# Patient Record
Sex: FEMALE | Race: WHITE | Employment: UNEMPLOYED | URBAN - METROPOLITAN AREA
[De-identification: names, ages, dates, MRNs, and addresses within clinical notes are randomized per-mention and may not be internally consistent; named-entity substitution may affect disease eponyms.]

---

## 2017-04-27 PROBLEM — M50.30 DEGENERATIVE DISC DISEASE, CERVICAL: Status: ACTIVE | Noted: 2017-04-27

## 2017-04-27 PROBLEM — G56.01 CARPAL TUNNEL SYNDROME OF RIGHT WRIST: Status: ACTIVE | Noted: 2017-04-27

## 2017-06-12 ENCOUNTER — HOSPITAL ENCOUNTER (OUTPATIENT)
Dept: WOMENS IMAGING | Age: 62
Discharge: HOME | End: 2017-06-12
Payer: COMMERCIAL

## 2017-06-12 DIAGNOSIS — Z12.39 SCREENING BREAST EXAMINATION: ICD-10-CM

## 2017-06-12 PROCEDURE — G0202 SCR MAMMO BI INCL CAD: HCPCS

## 2018-06-28 ENCOUNTER — HOSPITAL ENCOUNTER (OUTPATIENT)
Dept: WOMENS IMAGING | Age: 63
Discharge: HOME | End: 2018-06-30

## 2018-06-28 DIAGNOSIS — Z12.31 ENCOUNTER FOR SCREENING MAMMOGRAM FOR BREAST CANCER: ICD-10-CM

## 2018-06-28 PROCEDURE — 77067 SCR MAMMO BI INCL CAD: CPT

## 2020-03-24 ENCOUNTER — VIRTUAL VISIT (OUTPATIENT)
Dept: INTERNAL MEDICINE | Age: 65
End: 2020-03-24
Payer: COMMERCIAL

## 2020-03-24 VITALS
HEIGHT: 62 IN | BODY MASS INDEX: 43.43 KG/M2 | TEMPERATURE: 98 F | HEART RATE: 82 BPM | DIASTOLIC BLOOD PRESSURE: 82 MMHG | WEIGHT: 236 LBS | OXYGEN SATURATION: 96 % | SYSTOLIC BLOOD PRESSURE: 134 MMHG

## 2020-03-24 PROBLEM — I10 ESSENTIAL HYPERTENSION: Status: ACTIVE | Noted: 2020-03-24

## 2020-03-24 PROBLEM — Z90.710 H/O TOTAL HYSTERECTOMY: Status: ACTIVE | Noted: 2020-03-24

## 2020-03-24 PROBLEM — Z85.3 HISTORY OF LEFT BREAST CANCER: Status: ACTIVE | Noted: 2020-03-24

## 2020-03-24 PROBLEM — Z83.3 FH: DIABETES MELLITUS: Status: ACTIVE | Noted: 2020-03-24

## 2020-03-24 PROBLEM — E78.2 MIXED HYPERLIPIDEMIA: Status: ACTIVE | Noted: 2020-03-24

## 2020-03-24 PROCEDURE — 99203 OFFICE O/P NEW LOW 30 MIN: CPT | Performed by: PHYSICIAN ASSISTANT

## 2020-03-24 RX ORDER — LOVASTATIN 10 MG/1
TABLET ORAL
COMMUNITY
Start: 2020-03-03 | End: 2020-03-24 | Stop reason: SDUPTHER

## 2020-03-24 RX ORDER — LOSARTAN POTASSIUM AND HYDROCHLOROTHIAZIDE 25; 100 MG/1; MG/1
TABLET ORAL
Qty: 90 TABLET | Refills: 3 | Status: SHIPPED | OUTPATIENT
Start: 2020-03-24 | End: 2021-04-03

## 2020-03-24 RX ORDER — LOSARTAN POTASSIUM AND HYDROCHLOROTHIAZIDE 25; 100 MG/1; MG/1
TABLET ORAL
COMMUNITY
Start: 2020-03-06 | End: 2020-03-24 | Stop reason: SDUPTHER

## 2020-03-24 RX ORDER — LOVASTATIN 10 MG/1
TABLET ORAL
Qty: 90 TABLET | Refills: 3 | Status: SHIPPED | OUTPATIENT
Start: 2020-03-24 | End: 2021-04-03

## 2020-03-24 ASSESSMENT — ENCOUNTER SYMPTOMS
ABDOMINAL PAIN: 0
NAUSEA: 0
STRIDOR: 0
RECTAL PAIN: 0
VOMITING: 0
WHEEZING: 0
SHORTNESS OF BREATH: 0
CONSTIPATION: 0
DIARRHEA: 0
BLOOD IN STOOL: 0
CHEST TIGHTNESS: 0
COUGH: 0

## 2020-03-24 ASSESSMENT — PATIENT HEALTH QUESTIONNAIRE - PHQ9
SUM OF ALL RESPONSES TO PHQ QUESTIONS 1-9: 0
SUM OF ALL RESPONSES TO PHQ QUESTIONS 1-9: 0
1. LITTLE INTEREST OR PLEASURE IN DOING THINGS: 0
SUM OF ALL RESPONSES TO PHQ9 QUESTIONS 1 & 2: 0
2. FEELING DOWN, DEPRESSED OR HOPELESS: 0

## 2020-03-24 NOTE — PROGRESS NOTES
SUBJECTIVE  Odessa Silva, 60 y.o. female presents today with:  Chief Complaint   Patient presents with   [address]     Pt states prev pcp no longer takes her Insurance. Dr. Camron Reyes. Needs refills for BP, and chol meds     PCP:  ÁLVARO Gardiner      HPI     New patient here to establish care       Hypertension:    Home blood pressure monitoring: Yes - occasionally- runs within range 134-88. She uses a light salt adherent to a low sodium diet. Patient denies chest pain. Use of agents associated with hypertension: NSAIDS. Hyperlipidemia:  No new myalgias or GI upset on lovastatin (Mevacor). Takes aspirin daily     Mammogram done in 2018   Colonoscopy done in Aug 17/18- by dr Lorri Heimlich     Diabetes and Hypertension Visit Information    BP Readings from Last 3 Encounters:   03/24/20 134/82   04/27/17 124/60          No results found for: LABA1C, LABMICR, LDLCHOLESTEROL, LDLCALC, HDL, BUN, CREATININE, GLUCOSE     Patient Care Team:  ÁLVARO Gardiner as PCP - General (Physician Assistant)  ÁLVARO Gardiner as PCP - [facility] Empaneled Provider         Medical History Review  Past Medical, Family, and Social History reviewed and does contribute to the patient presenting condition    Health Maintenance   Topic Date Due    Potassium monitoring  1955    Creatinine monitoring  1955    Hepatitis C screen  1955    Lipid screen  05/14/1967    HIV screen  05/14/1972    DTaP/Tdap/Td vaccine (1 - Tdap) 05/14/1976    Diabetes screen  05/14/1997    Shingles Vaccine (1 of 2) 05/14/2007    Colon cancer screen colonoscopy  05/14/2007    Breast cancer screen  06/28/2019    Flu vaccine (1) 09/01/2019    Hepatitis A vaccine  Aged Out    Hepatitis B vaccine  Aged Out    Hib vaccine  Aged Out    Meningococcal (ACWY) vaccine  Aged Out    Pneumococcal 0-64 years Vaccine  Aged Out             No past medical history on file. No past surgical history on file.   Social History

## 2020-06-15 ENCOUNTER — HOSPITAL ENCOUNTER (OUTPATIENT)
Dept: LAB | Age: 65
Discharge: HOME | End: 2020-06-15
Payer: COMMERCIAL

## 2020-06-15 LAB
ALBUMIN SERPL-MCNC: 4.3 G/DL (ref 3.5–4.6)
ALP BLD-CCNC: 74 U/L (ref 40–130)
ALT SERPL-CCNC: 24 U/L (ref 0–33)
ANION GAP SERPL CALCULATED.3IONS-SCNC: 12 MEQ/L (ref 9–15)
AST SERPL-CCNC: 18 U/L (ref 0–35)
BASOPHILS ABSOLUTE: 0 K/UL (ref 0–0.2)
BASOPHILS RELATIVE PERCENT: 0.9 %
BILIRUB SERPL-MCNC: <0.2 MG/DL (ref 0.2–0.7)
BUN BLDV-MCNC: 18 MG/DL (ref 8–23)
CALCIUM SERPL-MCNC: 10.4 MG/DL (ref 8.5–9.9)
CHLORIDE BLD-SCNC: 106 MEQ/L (ref 95–107)
CHOLESTEROL, TOTAL: 167 MG/DL (ref 0–199)
CO2: 26 MEQ/L (ref 20–31)
CREAT SERPL-MCNC: 1.03 MG/DL (ref 0.5–0.9)
EOSINOPHILS ABSOLUTE: 0.1 K/UL (ref 0–0.7)
EOSINOPHILS RELATIVE PERCENT: 2.4 %
GFR AFRICAN AMERICAN: >60
GFR NON-AFRICAN AMERICAN: 54.1
GLOBULIN: 3.1 G/DL (ref 2.3–3.5)
GLUCOSE BLD-MCNC: 97 MG/DL (ref 70–99)
HBA1C MFR BLD: 6.1 % (ref 4.8–5.9)
HCT VFR BLD CALC: 39.7 % (ref 37–47)
HDLC SERPL-MCNC: 60 MG/DL (ref 40–59)
HEMOGLOBIN: 13.3 G/DL (ref 12–16)
LDL CHOLESTEROL CALCULATED: 74 MG/DL (ref 0–129)
LYMPHOCYTES ABSOLUTE: 1.6 K/UL (ref 1–4.8)
LYMPHOCYTES RELATIVE PERCENT: 34.1 %
MCH RBC QN AUTO: 29.2 PG (ref 27–31.3)
MCHC RBC AUTO-ENTMCNC: 33.5 % (ref 33–37)
MCV RBC AUTO: 87.3 FL (ref 82–100)
MONOCYTES ABSOLUTE: 0.4 K/UL (ref 0.2–0.8)
MONOCYTES RELATIVE PERCENT: 7.4 %
NEUTROPHILS ABSOLUTE: 2.7 K/UL (ref 1.4–6.5)
NEUTROPHILS RELATIVE PERCENT: 55.2 %
PDW BLD-RTO: 14.2 % (ref 11.5–14.5)
PLATELET # BLD: 316 K/UL (ref 130–400)
POTASSIUM SERPL-SCNC: 4.1 MEQ/L (ref 3.4–4.9)
RBC # BLD: 4.54 M/UL (ref 4.2–5.4)
SODIUM BLD-SCNC: 144 MEQ/L (ref 135–144)
TOTAL PROTEIN: 7.4 G/DL (ref 6.3–8)
TRIGL SERPL-MCNC: 166 MG/DL (ref 0–150)
WBC # BLD: 4.8 K/UL (ref 4.8–10.8)

## 2020-06-15 PROCEDURE — 80053 COMPREHEN METABOLIC PANEL: CPT

## 2020-06-15 PROCEDURE — 36415 COLL VENOUS BLD VENIPUNCTURE: CPT

## 2020-06-15 PROCEDURE — 85025 COMPLETE CBC W/AUTO DIFF WBC: CPT

## 2020-06-15 PROCEDURE — 83036 HEMOGLOBIN GLYCOSYLATED A1C: CPT

## 2020-06-15 PROCEDURE — 80061 LIPID PANEL: CPT

## 2020-06-18 PROBLEM — R73.03 PREDIABETES: Status: ACTIVE | Noted: 2020-06-18

## 2020-07-06 ENCOUNTER — HOSPITAL ENCOUNTER (OUTPATIENT)
Dept: WOMENS IMAGING | Age: 65
Discharge: HOME | End: 2020-07-08
Payer: COMMERCIAL

## 2020-07-06 PROCEDURE — 77067 SCR MAMMO BI INCL CAD: CPT

## 2020-07-17 RX ORDER — MECLIZINE HYDROCHLORIDE 25 MG/1
25 TABLET ORAL 3 TIMES DAILY PRN
Qty: 90 TABLET | Refills: 0 | Status: SHIPPED | OUTPATIENT
Start: 2020-07-17 | End: 2020-07-27

## 2020-07-17 NOTE — TELEPHONE ENCOUNTER
Requesting medication refill. Please approve or deny this request.    Rx requested:  Requested Prescriptions     Pending Prescriptions Disp Refills    meclizine (ANTIVERT) 25 MG tablet        Sig: Take by mouth       Last Office Visit:   3/24/20    Last Filled:  Nn/a    Last Labs:  6/15/20    Next Visit Date:  No future appointments.

## 2020-08-12 RX ORDER — MECLIZINE HYDROCHLORIDE 25 MG/1
25 TABLET ORAL 3 TIMES DAILY PRN
Qty: 15 TABLET | Refills: 0 | Status: SHIPPED | OUTPATIENT
Start: 2020-08-12 | End: 2021-04-09 | Stop reason: SDUPTHER

## 2020-08-12 NOTE — TELEPHONE ENCOUNTER
RAW requesting medication refill. Please approve or deny this request.    Rx requested:  Requested Prescriptions     Pending Prescriptions Disp Refills    meclizine (ANTIVERT) 25 MG tablet 15 tablet 0     Sig: Take 1 tablet by mouth 3 times daily as needed for Dizziness         Last Office Visit:   3/24/2020    LAST REFILL 7/17/20  Next Visit Date:  No future appointments.

## 2021-04-09 ENCOUNTER — OFFICE VISIT (OUTPATIENT)
Dept: INTERNAL MEDICINE | Age: 66
End: 2021-04-09
Payer: COMMERCIAL

## 2021-04-09 VITALS
OXYGEN SATURATION: 97 % | DIASTOLIC BLOOD PRESSURE: 86 MMHG | HEIGHT: 62 IN | WEIGHT: 224 LBS | BODY MASS INDEX: 41.22 KG/M2 | TEMPERATURE: 96.8 F | SYSTOLIC BLOOD PRESSURE: 124 MMHG | HEART RATE: 64 BPM

## 2021-04-09 DIAGNOSIS — E78.2 MIXED HYPERLIPIDEMIA: ICD-10-CM

## 2021-04-09 DIAGNOSIS — I10 ESSENTIAL HYPERTENSION: ICD-10-CM

## 2021-04-09 DIAGNOSIS — Z00.00 ANNUAL PHYSICAL EXAM: Primary | ICD-10-CM

## 2021-04-09 PROCEDURE — 99396 PREV VISIT EST AGE 40-64: CPT | Performed by: PHYSICIAN ASSISTANT

## 2021-04-09 RX ORDER — MECLIZINE HYDROCHLORIDE 25 MG/1
25 TABLET ORAL 3 TIMES DAILY PRN
Qty: 15 TABLET | Refills: 0 | Status: SHIPPED | OUTPATIENT
Start: 2021-04-09 | End: 2021-04-14

## 2021-04-09 RX ORDER — LOVASTATIN 10 MG/1
TABLET ORAL
Qty: 90 TABLET | Refills: 2 | Status: SHIPPED | OUTPATIENT
Start: 2021-04-09 | End: 2021-12-17 | Stop reason: SDUPTHER

## 2021-04-09 RX ORDER — LOSARTAN POTASSIUM AND HYDROCHLOROTHIAZIDE 25; 100 MG/1; MG/1
TABLET ORAL
Qty: 90 TABLET | Refills: 2 | Status: SHIPPED | OUTPATIENT
Start: 2021-04-09 | End: 2021-12-17 | Stop reason: SDUPTHER

## 2021-04-09 RX ORDER — MECLIZINE HCL 12.5 MG/1
25 TABLET ORAL 3 TIMES DAILY PRN
COMMUNITY
End: 2021-04-09 | Stop reason: SDUPTHER

## 2021-04-09 SDOH — ECONOMIC STABILITY: FOOD INSECURITY: WITHIN THE PAST 12 MONTHS, YOU WORRIED THAT YOUR FOOD WOULD RUN OUT BEFORE YOU GOT MONEY TO BUY MORE.: NEVER TRUE

## 2021-04-09 SDOH — ECONOMIC STABILITY: INCOME INSECURITY: HOW HARD IS IT FOR YOU TO PAY FOR THE VERY BASICS LIKE FOOD, HOUSING, MEDICAL CARE, AND HEATING?: NOT HARD AT ALL

## 2021-04-09 SDOH — ECONOMIC STABILITY: FOOD INSECURITY: WITHIN THE PAST 12 MONTHS, THE FOOD YOU BOUGHT JUST DIDN'T LAST AND YOU DIDN'T HAVE MONEY TO GET MORE.: NEVER TRUE

## 2021-04-09 SDOH — ECONOMIC STABILITY: TRANSPORTATION INSECURITY
IN THE PAST 12 MONTHS, HAS THE LACK OF TRANSPORTATION KEPT YOU FROM MEDICAL APPOINTMENTS OR FROM GETTING MEDICATIONS?: NO

## 2021-04-09 ASSESSMENT — PATIENT HEALTH QUESTIONNAIRE - PHQ9
SUM OF ALL RESPONSES TO PHQ9 QUESTIONS 1 & 2: 0
SUM OF ALL RESPONSES TO PHQ QUESTIONS 1-9: 0
1. LITTLE INTEREST OR PLEASURE IN DOING THINGS: 0

## 2021-04-09 ASSESSMENT — ENCOUNTER SYMPTOMS
RESPIRATORY NEGATIVE: 1
GASTROINTESTINAL NEGATIVE: 1
EYES NEGATIVE: 1

## 2021-04-09 NOTE — PROGRESS NOTES
Patient advised that lab orders were resent to [facility].   She verbally verified understanding.     Smokeless tobacco: Never Used   Substance and Sexual Activity    Alcohol use: No    Drug use: No    Sexual activity: Not on file   Lifestyle    Physical activity     Days per week: Not on file     Minutes per session: Not on file    Stress: Not on file   Relationships    Social connections     Talks on phone: Not on file     Gets together: Not on file     Attends Orthodox service: Not on file     Active member of club or organization: Not on file     Attends meetings of clubs or organizations: Not on file     Relationship status: Not on file    Intimate partner violence     Fear of current or ex partner: Not on file     Emotionally abused: Not on file     Physically abused: Not on file     Forced sexual activity: Not on file   Other Topics Concern    Not on file   Social History Narrative    Not on file        ADVANCE DIRECTIVE: N, <no information>    Vitals:    04/09/21 1002   BP: 124/86   Site: Right Upper Arm   Position: Sitting   Cuff Size: Large Adult   Pulse: 64   Temp: 96.8 °F (36 °C)   TempSrc: Temporal   SpO2: 97%   Weight: 224 lb (101.6 kg)   Height: 5' 2\" (1.575 m)       Physical Exam  Vitals signs reviewed. Constitutional:       Appearance: Normal appearance. HENT:      Head: Normocephalic and atraumatic. Neck:      Musculoskeletal: Normal range of motion and neck supple. Cardiovascular:      Rate and Rhythm: Normal rate. Pulses: Normal pulses. Heart sounds: Normal heart sounds. Pulmonary:      Effort: Pulmonary effort is normal.   Abdominal:      General: Bowel sounds are normal.      Palpations: Abdomen is soft. Musculoskeletal: Normal range of motion. Skin:     General: Skin is warm. Neurological:      General: No focal deficit present. Mental Status: She is alert and oriented to person, place, and time. Psychiatric:         Mood and Affect: Mood normal.         Behavior: Behavior normal.         Thought Content:  Thought content normal.         Judgment: Judgment normal.           Lab Results   Component Value Date    CHOL 167 06/15/2020    TRIG 166 06/15/2020    HDL 60 06/15/2020    LDLCALC 74 06/15/2020    GLUCOSE 97 06/15/2020       The 10-year ASCVD risk score (Ramandeep Roa, et al., 2013) is: 4.9%    Values used to calculate the score:      Age: 63 years      Sex: Female      Is Non- : No      Diabetic: No      Tobacco smoker: No      Systolic Blood Pressure: 985 mmHg      Is BP treated: Yes      HDL Cholesterol: 60 mg/dL      Total Cholesterol: 167 mg/dL      There is no immunization history on file for this patient. Health Maintenance   Topic Date Due    Hepatitis C screen  Never done    HIV screen  Never done    COVID-19 Vaccine (1) Never done    DTaP/Tdap/Td vaccine (1 - Tdap) Never done    Shingles Vaccine (1 of 2) Never done    Colon cancer screen colonoscopy  Never done    A1C test (Diabetic or Prediabetic)  06/15/2021    Lipid screen  06/15/2021    Potassium monitoring  06/15/2021    Creatinine monitoring  06/15/2021    Breast cancer screen  07/06/2021    Flu vaccine (Season Ended) 09/01/2021    Hepatitis A vaccine  Aged Out    Hepatitis B vaccine  Aged Out    Hib vaccine  Aged Out    Meningococcal (ACWY) vaccine  Aged Out    Pneumococcal 0-64 years Vaccine  Aged Out         ASSESSMENT/PLAN:  1. Annual physical exam  - CBC Auto Differential; Future  - Comprehensive Metabolic Panel; Future  - Lipid Panel; Future  - colonoscopy done with Dr Asael Coronel,  2017    2. Essential hypertension  - stable   - losartan-hydroCHLOROthiazide (HYZAAR) 100-25 MG per tablet; take 1 tablet by mouth once daily  Dispense: 90 tablet; Refill: 2    3. Mixed hyperlipidemia  - controlled   - lovastatin (MEVACOR) 10 MG tablet; take 1 tablet by mouth once daily  Dispense: 90 tablet; Refill: 2          Return in about 1 year (around 4/9/2022) for annual with fasting labs. An electronic signature was used to authenticate this note.     --Liseth Simpson ÁLVARO Samuel on 1/29/2021 at 12:17 PM

## 2021-04-14 RX ORDER — MECLIZINE HYDROCHLORIDE 25 MG/1
TABLET ORAL
Qty: 90 TABLET | Refills: 1 | Status: SHIPPED | OUTPATIENT
Start: 2021-04-14 | End: 2021-06-22

## 2021-06-12 ENCOUNTER — HOSPITAL ENCOUNTER (OUTPATIENT)
Dept: LAB | Age: 66
Discharge: HOME | End: 2021-06-12
Payer: COMMERCIAL

## 2021-06-12 DIAGNOSIS — Z00.00 ANNUAL PHYSICAL EXAM: ICD-10-CM

## 2021-06-12 LAB
ALBUMIN SERPL-MCNC: 4.1 G/DL (ref 3.5–4.6)
ALP BLD-CCNC: 73 U/L (ref 40–130)
ALT SERPL-CCNC: 20 U/L (ref 0–33)
ANION GAP SERPL CALCULATED.3IONS-SCNC: 11 MEQ/L (ref 9–15)
AST SERPL-CCNC: 23 U/L (ref 0–35)
BASOPHILS ABSOLUTE: 0 K/UL (ref 0–0.2)
BASOPHILS RELATIVE PERCENT: 1 %
BILIRUB SERPL-MCNC: 0.4 MG/DL (ref 0.2–0.7)
BUN BLDV-MCNC: 17 MG/DL (ref 8–23)
CALCIUM SERPL-MCNC: 9.9 MG/DL (ref 8.5–9.9)
CHLORIDE BLD-SCNC: 101 MEQ/L (ref 95–107)
CHOLESTEROL, TOTAL: 168 MG/DL (ref 0–199)
CO2: 26 MEQ/L (ref 20–31)
CREAT SERPL-MCNC: 0.94 MG/DL (ref 0.5–0.9)
EOSINOPHILS ABSOLUTE: 0.1 K/UL (ref 0–0.7)
EOSINOPHILS RELATIVE PERCENT: 2.9 %
GFR AFRICAN AMERICAN: >60
GFR NON-AFRICAN AMERICAN: 59.9
GLOBULIN: 3 G/DL (ref 2.3–3.5)
GLUCOSE BLD-MCNC: 94 MG/DL (ref 70–99)
HCT VFR BLD CALC: 38.4 % (ref 37–47)
HDLC SERPL-MCNC: 60 MG/DL (ref 40–59)
HEMOGLOBIN: 13 G/DL (ref 12–16)
LDL CHOLESTEROL CALCULATED: 77 MG/DL (ref 0–129)
LYMPHOCYTES ABSOLUTE: 1.5 K/UL (ref 1–4.8)
LYMPHOCYTES RELATIVE PERCENT: 33.8 %
MCH RBC QN AUTO: 30 PG (ref 27–31.3)
MCHC RBC AUTO-ENTMCNC: 33.9 % (ref 33–37)
MCV RBC AUTO: 88.5 FL (ref 82–100)
MONOCYTES ABSOLUTE: 0.4 K/UL (ref 0.2–0.8)
MONOCYTES RELATIVE PERCENT: 9.8 %
NEUTROPHILS ABSOLUTE: 2.3 K/UL (ref 1.4–6.5)
NEUTROPHILS RELATIVE PERCENT: 52.5 %
PDW BLD-RTO: 14.2 % (ref 11.5–14.5)
PLATELET # BLD: 283 K/UL (ref 130–400)
POTASSIUM SERPL-SCNC: 3.7 MEQ/L (ref 3.4–4.9)
RBC # BLD: 4.34 M/UL (ref 4.2–5.4)
SODIUM BLD-SCNC: 138 MEQ/L (ref 135–144)
TOTAL PROTEIN: 7.1 G/DL (ref 6.3–8)
TRIGL SERPL-MCNC: 154 MG/DL (ref 0–150)
WBC # BLD: 4.4 K/UL (ref 4.8–10.8)

## 2021-06-12 PROCEDURE — 80061 LIPID PANEL: CPT

## 2021-06-12 PROCEDURE — 36415 COLL VENOUS BLD VENIPUNCTURE: CPT

## 2021-06-12 PROCEDURE — 85025 COMPLETE CBC W/AUTO DIFF WBC: CPT

## 2021-06-12 PROCEDURE — 80053 COMPREHEN METABOLIC PANEL: CPT

## 2021-06-17 NOTE — TELEPHONE ENCOUNTER
Requesting medication refill. Please approve or deny this request.    Rx requested:  Requested Prescriptions     Pending Prescriptions Disp Refills    meclizine (ANTIVERT) 25 MG tablet [Pharmacy Med Name: MECLIZINE 25 MG TABLET] 90 tablet 1     Sig: take 1 tablet by mouth three times a day if needed for dizziness       Last Office Visit, reason seen and by who:   4/9/2021 Annual Sully Victor      FOLLOW UP PLAN FROM LAST VISIT: COPY AND PASTE FROM LAST NOTE    Return in about 1 year (around 4/9/2022) for annual with fasting labs. PATIENT CONTACTED FOR A FOLLOW UP APPT:   YES OR NO    no    Next Visit Date:  No future appointments.

## 2021-06-22 RX ORDER — MECLIZINE HYDROCHLORIDE 25 MG/1
TABLET ORAL
Qty: 90 TABLET | Refills: 1 | Status: SHIPPED | OUTPATIENT
Start: 2021-06-22 | End: 2021-09-21

## 2021-09-21 RX ORDER — MECLIZINE HYDROCHLORIDE 25 MG/1
TABLET ORAL
Qty: 90 TABLET | Refills: 1 | Status: SHIPPED | OUTPATIENT
Start: 2021-09-21 | End: 2021-11-18

## 2021-09-21 NOTE — TELEPHONE ENCOUNTER
Comments:     Last Office Visit (last PCP visit):   4/9/2021    Next Visit Date:  No future appointments. **If hasn't been seen in over a year OR hasn't followed up according to last diabetes/ADHD visit, make appointment for patient before sending refill to provider.     Rx requested:  Requested Prescriptions     Pending Prescriptions Disp Refills    meclizine (ANTIVERT) 25 MG tablet [Pharmacy Med Name: MECLIZINE 25 MG TABLET] 90 tablet 1     Sig: take 1 tablet by mouth three times a day if needed for dizziness

## 2021-11-18 RX ORDER — MECLIZINE HYDROCHLORIDE 25 MG/1
TABLET ORAL
Qty: 90 TABLET | Refills: 1 | Status: SHIPPED | OUTPATIENT
Start: 2021-11-18 | End: 2022-01-18

## 2021-12-17 ENCOUNTER — OFFICE VISIT (OUTPATIENT)
Dept: INTERNAL MEDICINE | Age: 66
End: 2021-12-17
Payer: COMMERCIAL

## 2021-12-17 VITALS
TEMPERATURE: 97.1 F | DIASTOLIC BLOOD PRESSURE: 72 MMHG | OXYGEN SATURATION: 96 % | HEART RATE: 71 BPM | BODY MASS INDEX: 42.69 KG/M2 | SYSTOLIC BLOOD PRESSURE: 136 MMHG | WEIGHT: 233.4 LBS

## 2021-12-17 VITALS
SYSTOLIC BLOOD PRESSURE: 138 MMHG | TEMPERATURE: 98.2 F | BODY MASS INDEX: 42.88 KG/M2 | DIASTOLIC BLOOD PRESSURE: 86 MMHG | WEIGHT: 233 LBS | HEIGHT: 62 IN | OXYGEN SATURATION: 97 % | HEART RATE: 79 BPM

## 2021-12-17 DIAGNOSIS — L20.9 ATOPIC DERMATITIS, UNSPECIFIED TYPE: Primary | ICD-10-CM

## 2021-12-17 DIAGNOSIS — R06.2 WHEEZING: ICD-10-CM

## 2021-12-17 DIAGNOSIS — I10 ESSENTIAL HYPERTENSION: Primary | ICD-10-CM

## 2021-12-17 DIAGNOSIS — R25.2 LEG CRAMPS: ICD-10-CM

## 2021-12-17 DIAGNOSIS — E78.2 MIXED HYPERLIPIDEMIA: ICD-10-CM

## 2021-12-17 DIAGNOSIS — R73.03 PREDIABETES: ICD-10-CM

## 2021-12-17 PROCEDURE — 99213 OFFICE O/P EST LOW 20 MIN: CPT | Performed by: NURSE PRACTITIONER

## 2021-12-17 PROCEDURE — 99214 OFFICE O/P EST MOD 30 MIN: CPT | Performed by: PHYSICIAN ASSISTANT

## 2021-12-17 RX ORDER — LOSARTAN POTASSIUM AND HYDROCHLOROTHIAZIDE 25; 100 MG/1; MG/1
TABLET ORAL
Qty: 90 TABLET | Refills: 1 | Status: SHIPPED | OUTPATIENT
Start: 2021-12-17 | End: 2022-10-20

## 2021-12-17 RX ORDER — DOXYCYCLINE HYCLATE 100 MG
100 TABLET ORAL 2 TIMES DAILY
Qty: 14 TABLET | Refills: 0 | Status: CANCELLED | OUTPATIENT
Start: 2021-12-17 | End: 2021-12-24

## 2021-12-17 RX ORDER — LOVASTATIN 10 MG/1
TABLET ORAL
Qty: 90 TABLET | Refills: 1 | Status: SHIPPED | OUTPATIENT
Start: 2021-12-17 | End: 2022-01-04 | Stop reason: SINTOL

## 2021-12-17 RX ORDER — CETIRIZINE HYDROCHLORIDE 10 MG/1
10 TABLET ORAL DAILY
Qty: 90 TABLET | Refills: 1 | Status: SHIPPED | OUTPATIENT
Start: 2021-12-17

## 2021-12-17 RX ORDER — TRIAMCINOLONE ACETONIDE 0.25 MG/G
OINTMENT TOPICAL
Qty: 1 EACH | Refills: 1 | Status: SHIPPED | OUTPATIENT
Start: 2021-12-17 | End: 2021-12-24

## 2021-12-17 ASSESSMENT — ENCOUNTER SYMPTOMS
SORE THROAT: 0
GASTROINTESTINAL NEGATIVE: 1
WHEEZING: 0
SHORTNESS OF BREATH: 0
RESPIRATORY NEGATIVE: 1
COUGH: 0
RHINORRHEA: 0

## 2021-12-17 NOTE — PROGRESS NOTES
Subjective:      Patient ID: Fernando Snow is a 61 y.o. female who presents today for:  Chief Complaint   Patient presents with    Otalgia     left, itching, x 6 days       Otalgia   There is pain in the left ear. This is a new problem. The current episode started in the past 7 days. The problem occurs constantly. The problem has been gradually worsening. There has been no fever. The pain is at a severity of 2/10. The pain is mild. Pertinent negatives include no coughing, ear discharge, headaches, hearing loss, neck pain, rhinorrhea or sore throat. She has tried NSAIDs for the symptoms. The treatment provided significant relief. There is no history of a chronic ear infection, hearing loss or a tympanostomy tube. History reviewed. No pertinent past medical history. History reviewed. No pertinent surgical history. History reviewed. No pertinent family history. Allergies   Allergen Reactions    Augmentin [Amoxicillin-Pot Clavulanate]     Erythromycin     Penicillins     Ciprofloxacin Rash         Review of Systems   Constitutional: Negative for chills, fatigue and fever. HENT: Positive for ear pain. Negative for congestion, ear discharge, hearing loss, rhinorrhea and sore throat. Respiratory: Negative for cough, shortness of breath and wheezing. Cardiovascular: Negative for chest pain. Musculoskeletal: Negative for neck pain. Neurological: Negative for headaches. Objective:   /72   Pulse 71   Temp 97.1 °F (36.2 °C) (Infrared)   Wt 233 lb 6.4 oz (105.9 kg)   SpO2 96%   BMI 42.69 kg/m²     Physical Exam  Vitals reviewed. Constitutional:       General: She is not in acute distress. Appearance: She is well-developed. She is not ill-appearing. HENT:      Head: Normocephalic. Right Ear: Tympanic membrane, ear canal and external ear normal.      Left Ear: Tympanic membrane and external ear normal. Swelling present. No drainage or tenderness.  Tympanic membrane is not erythematous. Ears:      Comments: Mild swelling and flaking skin near the external opening of left ear canal.     Nose: Nose normal.      Mouth/Throat:      Lips: Pink. Mouth: Mucous membranes are moist.      Pharynx: Oropharynx is clear. No oropharyngeal exudate or posterior oropharyngeal erythema. Cardiovascular:      Rate and Rhythm: Normal rate and regular rhythm. Heart sounds: Normal heart sounds. Pulmonary:      Effort: Pulmonary effort is normal. No respiratory distress. Breath sounds: Normal breath sounds. Musculoskeletal:         General: Normal range of motion. Lymphadenopathy:      Cervical: No cervical adenopathy. Skin:     General: Skin is warm and dry. Neurological:      Mental Status: She is alert and oriented to person, place, and time. Assessment:       Diagnosis Orders   1. Atopic dermatitis, unspecified type  triamcinolone (KENALOG) 0.025 % ointment         Plan:      No orders of the defined types were placed in this encounter. Orders Placed This Encounter   Medications    triamcinolone (KENALOG) 0.025 % ointment     Sig: Apply topically 2 times daily. Dispense:  1 each     Refill:  1     Reviewed supportive measures for symptom management. Medication administration and side effects were discussed. Patient verbalizes understanding. I have reviewed and updated the electronic medical record. Return if symptoms worsen or fail to improve, for follow up with PCP.     ISSAC Aguiar NP

## 2021-12-17 NOTE — PROGRESS NOTES
EDDIE Reece (: 1955) is a 61 y.o. female, Established patient, here for evaluation of the following chief complaint(s):  Dysmenorrhea (Due to Cholesterol meds), Wheezing (Says her asthma acts up in the fall), and Hypertension (vertigo )      PCP:  ÁLVARO Khan      HPI    Hypertension:    Home blood pressure monitoring: Yes - NOT OFTEN. She is not adherent to a low sodium diet. Patient complains of dIZZINESS. Use of agents associated with hypertension: NSAIDS. States it was been going up at times. Worse after having vertigo , getting leg cramps at night     Hyperlipidemia:  No new myalgias or GI upset on lovastatin (Mevacor).          Back issues  Seeing a chiropractor  Worried that it is her statin     Wheezing at night sometimes  Started about 4 months ago       Diabetes and Hypertension Visit Information    BP Readings from Last 3 Encounters:   21 138/86   21 136/72   21 124/86          Hemoglobin A1C (%)   Date Value   06/15/2020 6.1 (H)     LDL Calculated (mg/dL)   Date Value   2021 77     HDL (mg/dL)   Date Value   2021 60 (H)     BUN (mg/dL)   Date Value   2021 17     CREATININE (mg/dL)   Date Value   2021 0.94 (H)     Glucose (mg/dL)   Date Value   2021 94        Patient Care Team:  ÁLVARO Khan as PCP - General (Physician Assistant)  ÁLVARO Khan [facility] PCP - [facility]         Medical History Review  Past Medical, Family, and Social History reviewed and does not contribute to the patient presenting condition    Health Maintenance   Topic Date Due    Hepatitis C screen  Never done    COVID-19 Vaccine (1) Never done    HIV screen  Never done    DTaP/Tdap/Td vaccine (1 - Tdap) Never done    Colon cancer screen colonoscopy  Never done    Shingles Vaccine (1 of 2) Never done    A1C test (Diabetic or Prediabetic)  06/15/2021    Breast cancer screen  2021    Flu vaccine (1) Never done   [employer] Lipid screen  06/12/2022    Potassium monitoring  06/12/2022    Creatinine monitoring  06/12/2022    Hepatitis A vaccine  Aged Out    Hepatitis B vaccine  Aged Out    Hib vaccine  Aged Out    Meningococcal (ACWY) vaccine  Aged Out    Pneumococcal 0-64 years Vaccine  Aged Out           Social History     Socioeconomic History    Marital status:      Spouse name: Not on file    Number of children: Not on file    Years of education: Not on file    Highest education level: Not on file   Occupational History    Not on file   Tobacco Use    Smoking status: Never Smoker    Smokeless tobacco: Never Used   Substance and Sexual Activity    Alcohol use: No    Drug use: No    Sexual activity: Not on file   Other Topics Concern    Not on file   Social History Narrative    Not on file     Social Determinants of Health     Financial Resource Strain: Low Risk     Difficulty of Paying Living Expenses: Not hard at all   Food Insecurity: No Food Insecurity    Worried About [address] [place] [address] in the Last Year: Never true    Shahid of Food in the Last Year: Never true   Transportation Needs: No Transportation Needs    Lack of Transportation (Medical): No    Lack of Transportation (Non-Medical):  No   Physical Activity:     Days of Exercise per Week: Not on file    Minutes of Exercise per Session: Not on file   Stress:     Feeling of Stress : Not on file   Social Connections:     Frequency of Communication with Friends and Family: Not on file    Frequency of Social Gatherings with Friends and Family: Not on file    Attends Hinduism Services: Not on file    Active Member of Clubs or Organizations: Not on file    Attends Club or Organization Meetings: Not on file    Marital Status: Not on file   Intimate Partner Violence:     Fear of Current or Ex-Partner: Not on file    Emotionally Abused: Not on file    Physically Abused: Not on file    Sexually Abused: Not on file   Housing Stability:     Unable to Pay for Housing in the Last Year: Not on file    Number of Places Lived in the Last Year: Not on file    Unstable Housing in the Last Year: Not on file       Review of Systems   Constitutional: Negative. HENT: Negative. Respiratory: Negative. Cardiovascular: Negative. Gastrointestinal: Negative. Genitourinary: Negative. Musculoskeletal: Negative. Neurological: Negative. Hematological: Negative. Psychiatric/Behavioral: Negative. I have reviewed the patient's medical history in detail and updated the computerized patient record. OBJECTIVE    Vitals:    12/17/21 1419   BP: 138/86   Site: Right Upper Arm   Position: Sitting   Cuff Size: Large Adult   Pulse: 79   Temp: 98.2 °F (36.8 °C)   TempSrc: Temporal   SpO2: 97%   Weight: 233 lb (105.7 kg)   Height: 5' 2\" (1.575 m)       Physical Exam  Vitals reviewed. Constitutional:       Appearance: Normal appearance. HENT:      Head: Normocephalic and atraumatic. Cardiovascular:      Rate and Rhythm: Normal rate and regular rhythm. Pulses: Normal pulses. Heart sounds: Normal heart sounds. Pulmonary:      Effort: Pulmonary effort is normal.      Breath sounds: Normal breath sounds. Skin:     General: Skin is warm. Neurological:      Mental Status: She is alert and oriented to person, place, and time. Psychiatric:         Mood and Affect: Mood normal.         Behavior: Behavior normal.         Thought Content: Thought content normal.         Judgment: Judgment normal.           ASSESSMENT/ PLAN    1. Essential hypertension  - controlled , continue current meds    - losartan-hydroCHLOROthiazide (HYZAAR) 100-25 MG per tablet; take 1 tablet by mouth once daily  Dispense: 90 tablet; Refill: 1    2.  Mixed hyperlipidemia  - on statin  - pt is concerned about cramps in the back muscles, can try 2 weeks off the medication and see if pains are better, if not continue statin   - lovastatin (MEVACOR) 10 MG tablet; take 1 tablet by mouth once daily  Dispense: 90 tablet; Refill: 1    3. Prediabetes  - will get the A1C done at night visit when labs are due      4. Wheezing  - exam of the lungs are normal  - she will return when if is wheezing for an exam     5. Leg cramps  - will go off the statin for 2 weeks, see above                 Return in about 5 months (around 5/17/2022).      Electronically signed by:  ÁLVARO Vizcaino   12/31/21

## 2022-01-04 ENCOUNTER — OFFICE VISIT (OUTPATIENT)
Dept: INTERNAL MEDICINE | Age: 67
End: 2022-01-04
Payer: COMMERCIAL

## 2022-01-04 VITALS
HEIGHT: 62 IN | WEIGHT: 228 LBS | DIASTOLIC BLOOD PRESSURE: 98 MMHG | HEART RATE: 92 BPM | BODY MASS INDEX: 41.96 KG/M2 | TEMPERATURE: 98.1 F | SYSTOLIC BLOOD PRESSURE: 150 MMHG

## 2022-01-04 DIAGNOSIS — U07.1 COVID-19: Primary | ICD-10-CM

## 2022-01-04 DIAGNOSIS — J02.9 SORE THROAT: ICD-10-CM

## 2022-01-04 LAB
Lab: ABNORMAL
PERFORMING INSTRUMENT: ABNORMAL
QC PASS/FAIL: ABNORMAL
SARS-COV-2, POC: DETECTED

## 2022-01-04 PROCEDURE — 87426 SARSCOV CORONAVIRUS AG IA: CPT | Performed by: PHYSICIAN ASSISTANT

## 2022-01-04 PROCEDURE — 99213 OFFICE O/P EST LOW 20 MIN: CPT | Performed by: PHYSICIAN ASSISTANT

## 2022-01-04 RX ORDER — METHYLPREDNISOLONE 4 MG/1
TABLET ORAL
Qty: 1 KIT | Refills: 0 | Status: SHIPPED | OUTPATIENT
Start: 2022-01-04 | End: 2022-01-10

## 2022-01-04 ASSESSMENT — ENCOUNTER SYMPTOMS
RESPIRATORY NEGATIVE: 1
SINUS PAIN: 0
SINUS PRESSURE: 0
SORE THROAT: 1
RHINORRHEA: 0

## 2022-01-04 NOTE — PROGRESS NOTES
Freddie Ramsey (: 1955) is a 61 y.o. female, Established patient, here for evaluation of the following chief complaint(s):  Pharyngitis (Left side worse than right x's 4 days), Health Maintenance (Says she had Colonoscopy w/ Dr. Farhan Pollock we sent out for records, no records found), and Otalgia (left)        ASSESSMENT/PLAN:  1. COVID-19  2. Sore throat  - POCT COVID-19, Antigen- positive   - methylPREDNISolone (MEDROL DOSEPACK) 4 MG tablet; Take by mouth. Dispense: 1 kit; Refill: 0  - Self quarantine  - OTC symptom control  - Continue to wear mask, social distance, and wash hands frequently  - Call 911 or go to the ER should symptoms become difficult to manage  - can return to work/school if asymptomatic after day 5, wearing a mask for 5 more days when out in public         No follow-ups on file. SUBJECTIVE/OBJECTIVE:  HPI      Concern for covid  Symptoms for 4 days  Current symptoms- ST   Covid vaccines- No  Influenza vaccines - No  Known covid exposure- Yes, 2 weeks ago   Fever- No  Loss of smell or taste -  No  Feels like her throat is swollen     Review of Systems   Constitutional: Negative. HENT: Positive for sore throat. Negative for congestion, postnasal drip, rhinorrhea, sinus pressure and sinus pain. Respiratory: Negative. Cardiovascular: Negative. Musculoskeletal: Negative for myalgias. Physical Exam  Vitals reviewed. Constitutional:       Appearance: Normal appearance. HENT:      Head: Normocephalic and atraumatic. Cardiovascular:      Rate and Rhythm: Normal rate. Heart sounds: Normal heart sounds. Pulmonary:      Effort: Pulmonary effort is normal.      Breath sounds: Normal breath sounds. Neurological:      Mental Status: She is alert.    Psychiatric:         Mood and Affect: Mood normal.         Behavior: Behavior normal.         Vitals:    22 0733 22 0735   BP: (!) 152/102 (!) 150/98   Site: Right Upper Arm Right Lower Arm   Position: Sitting Sitting   Cuff Size: Large Adult Large Adult   Pulse: 92    Temp: 98.1 °F (36.7 °C)    TempSrc: Temporal    Weight: 228 lb (103.4 kg)    Height: 5' 2\" (1.575 m)                  An electronic signature was used to authenticate this note.     --ÁLVARO Clark

## 2022-01-17 NOTE — TELEPHONE ENCOUNTER
Comments:     Last Office Visit (last PCP visit):   1/4/2022    Next Visit Date:  No future appointments. **If hasn't been seen in over a year OR hasn't followed up according to last diabetes/ADHD visit, make appointment for patient before sending refill to provider.     Rx requested:  Requested Prescriptions     Pending Prescriptions Disp Refills    meclizine (ANTIVERT) 25 MG tablet [Pharmacy Med Name: MECLIZINE 25 MG TABLET] 90 tablet 1     Sig: take 1 tablet by mouth three times a day if needed for dizziness

## 2022-01-18 RX ORDER — MECLIZINE HYDROCHLORIDE 25 MG/1
TABLET ORAL
Qty: 90 TABLET | Refills: 1 | Status: SHIPPED | OUTPATIENT
Start: 2022-01-18

## 2022-10-12 ENCOUNTER — TELEPHONE (OUTPATIENT)
Dept: INTERNAL MEDICINE | Age: 67
End: 2022-10-12

## 2022-10-12 DIAGNOSIS — Z12.31 SCREENING MAMMOGRAM FOR BREAST CANCER: Primary | ICD-10-CM

## 2022-10-19 DIAGNOSIS — I10 ESSENTIAL HYPERTENSION: ICD-10-CM

## 2022-10-19 NOTE — TELEPHONE ENCOUNTER
Comments:     Last Office Visit (last PCP visit):   1/4/2022    Next Visit Date:  Future Appointments   Date Time Provider Akin Hightower   10/31/2022  8:30 AM [facility] 1 [facility] RAD       **If hasn't been seen in over a year OR hasn't followed up according to last diabetes/ADHD visit, make appointment for patient before sending refill to provider.     Rx requested:  Requested Prescriptions     Pending Prescriptions Disp Refills    losartan-hydroCHLOROthiazide (HYZAAR) 100-25 MG per tablet [Pharmacy Med Name: LOSARTAN-HCTZ 100-25 MG TAB] 90 tablet 1     Sig: take 1 tablet by mouth once daily

## 2022-10-20 RX ORDER — LOSARTAN POTASSIUM AND HYDROCHLOROTHIAZIDE 25; 100 MG/1; MG/1
TABLET ORAL
Qty: 90 TABLET | Refills: 1 | Status: SHIPPED | OUTPATIENT
Start: 2022-10-20

## 2022-10-31 ENCOUNTER — HOSPITAL ENCOUNTER (OUTPATIENT)
Dept: WOMENS IMAGING | Age: 67
Discharge: HOME | End: 2022-11-02
Payer: MEDICARE

## 2022-10-31 DIAGNOSIS — Z12.31 SCREENING MAMMOGRAM FOR BREAST CANCER: ICD-10-CM

## 2022-10-31 PROCEDURE — 77067 SCR MAMMO BI INCL CAD: CPT

## 2023-04-01 DIAGNOSIS — I10 ESSENTIAL HYPERTENSION: ICD-10-CM

## 2023-04-02 SDOH — ECONOMIC STABILITY: TRANSPORTATION INSECURITY
IN THE PAST 12 MONTHS, HAS LACK OF TRANSPORTATION KEPT YOU FROM MEETINGS, WORK, OR FROM GETTING THINGS NEEDED FOR DAILY LIVING?: NO

## 2023-04-02 SDOH — ECONOMIC STABILITY: INCOME INSECURITY: HOW HARD IS IT FOR YOU TO PAY FOR THE VERY BASICS LIKE FOOD, HOUSING, MEDICAL CARE, AND HEATING?: NOT HARD AT ALL

## 2023-04-02 SDOH — ECONOMIC STABILITY: FOOD INSECURITY: WITHIN THE PAST 12 MONTHS, YOU WORRIED THAT YOUR FOOD WOULD RUN OUT BEFORE YOU GOT MONEY TO BUY MORE.: NEVER TRUE

## 2023-04-02 SDOH — ECONOMIC STABILITY: HOUSING INSECURITY
IN THE LAST 12 MONTHS, WAS THERE A TIME WHEN YOU DID NOT HAVE A STEADY PLACE TO SLEEP OR SLEPT IN A SHELTER (INCLUDING NOW)?: NO

## 2023-04-02 SDOH — ECONOMIC STABILITY: FOOD INSECURITY: WITHIN THE PAST 12 MONTHS, THE FOOD YOU BOUGHT JUST DIDN'T LAST AND YOU DIDN'T HAVE MONEY TO GET MORE.: NEVER TRUE

## 2023-04-03 RX ORDER — LOSARTAN POTASSIUM AND HYDROCHLOROTHIAZIDE 25; 100 MG/1; MG/1
TABLET ORAL
Qty: 30 TABLET | Refills: 0 | Status: SHIPPED | OUTPATIENT
Start: 2023-04-03 | End: 2023-04-04 | Stop reason: SDUPTHER

## 2023-04-04 ENCOUNTER — OFFICE VISIT (OUTPATIENT)
Dept: INTERNAL MEDICINE | Age: 68
End: 2023-04-04
Payer: MEDICARE

## 2023-04-04 VITALS
RESPIRATION RATE: 16 BRPM | OXYGEN SATURATION: 95 % | BODY MASS INDEX: 39.69 KG/M2 | WEIGHT: 224 LBS | DIASTOLIC BLOOD PRESSURE: 78 MMHG | SYSTOLIC BLOOD PRESSURE: 130 MMHG | HEIGHT: 63 IN | HEART RATE: 78 BPM

## 2023-04-04 DIAGNOSIS — Z28.21 PNEUMOCOCCAL VACCINATION DECLINED: ICD-10-CM

## 2023-04-04 DIAGNOSIS — E78.2 MIXED HYPERLIPIDEMIA: ICD-10-CM

## 2023-04-04 DIAGNOSIS — R73.03 PREDIABETES: ICD-10-CM

## 2023-04-04 DIAGNOSIS — Z82.49 FH: HEART DISEASE: ICD-10-CM

## 2023-04-04 DIAGNOSIS — I10 ESSENTIAL HYPERTENSION: ICD-10-CM

## 2023-04-04 DIAGNOSIS — Z78.0 ASYMPTOMATIC MENOPAUSAL STATE: ICD-10-CM

## 2023-04-04 DIAGNOSIS — Z13.6 SCREENING FOR HEART DISEASE: ICD-10-CM

## 2023-04-04 DIAGNOSIS — Z11.4 ENCOUNTER FOR SCREENING FOR HIV: ICD-10-CM

## 2023-04-04 DIAGNOSIS — Z00.00 WELCOME TO MEDICARE PREVENTIVE VISIT: Primary | ICD-10-CM

## 2023-04-04 DIAGNOSIS — Z00.00 WELCOME TO MEDICARE PREVENTIVE VISIT: ICD-10-CM

## 2023-04-04 LAB
ALBUMIN SERPL-MCNC: 4.6 G/DL (ref 3.5–4.6)
ALP SERPL-CCNC: 80 U/L (ref 40–130)
ALT SERPL-CCNC: 26 U/L (ref 0–33)
ANION GAP SERPL CALCULATED.3IONS-SCNC: 11 MEQ/L (ref 9–15)
AST SERPL-CCNC: 25 U/L (ref 0–35)
BASOPHILS # BLD: 0.1 K/UL (ref 0–0.2)
BASOPHILS NFR BLD: 1.3 %
BILIRUB SERPL-MCNC: 0.4 MG/DL (ref 0.2–0.7)
BUN SERPL-MCNC: 19 MG/DL (ref 8–23)
CALCIUM SERPL-MCNC: 10.4 MG/DL (ref 8.5–9.9)
CHLORIDE SERPL-SCNC: 103 MEQ/L (ref 95–107)
CHOLEST SERPL-MCNC: 231 MG/DL (ref 0–199)
CO2 SERPL-SCNC: 27 MEQ/L (ref 20–31)
CREAT SERPL-MCNC: 0.97 MG/DL (ref 0.5–0.9)
EOSINOPHIL # BLD: 0.1 K/UL (ref 0–0.7)
EOSINOPHIL NFR BLD: 2.3 %
ERYTHROCYTE [DISTWIDTH] IN BLOOD BY AUTOMATED COUNT: 14.1 % (ref 11.5–14.5)
GLOBULIN SER CALC-MCNC: 3 G/DL (ref 2.3–3.5)
GLUCOSE FASTING: 87 MG/DL (ref 70–99)
HBA1C MFR BLD: 5.9 % (ref 4.8–5.9)
HCT VFR BLD AUTO: 42.6 % (ref 37–47)
HDLC SERPL-MCNC: 63 MG/DL (ref 40–59)
HGB BLD-MCNC: 14 G/DL (ref 12–16)
LDL CHOLESTEROL CALCULATED: 127 MG/DL (ref 0–129)
LYMPHOCYTES # BLD: 1.7 K/UL (ref 1–4.8)
LYMPHOCYTES NFR BLD: 31.9 %
MCH RBC QN AUTO: 29.4 PG (ref 27–31.3)
MCHC RBC AUTO-ENTMCNC: 32.8 % (ref 33–37)
MCV RBC AUTO: 89.6 FL (ref 79.4–94.8)
MONOCYTES # BLD: 0.4 K/UL (ref 0.2–0.8)
MONOCYTES NFR BLD: 8.2 %
NEUTROPHILS # BLD: 2.9 K/UL (ref 1.4–6.5)
NEUTS SEG NFR BLD: 56.3 %
PLATELET # BLD AUTO: 338 K/UL (ref 130–400)
POTASSIUM SERPL-SCNC: 4.4 MEQ/L (ref 3.4–4.9)
PROT SERPL-MCNC: 7.6 G/DL (ref 6.3–8)
RBC # BLD AUTO: 4.76 M/UL (ref 4.2–5.4)
SODIUM SERPL-SCNC: 141 MEQ/L (ref 135–144)
TRIGLYCERIDE, FASTING: 205 MG/DL (ref 0–150)
WBC # BLD AUTO: 5.2 K/UL (ref 4.8–10.8)

## 2023-04-04 PROCEDURE — 1123F ACP DISCUSS/DSCN MKR DOCD: CPT | Performed by: PHYSICIAN ASSISTANT

## 2023-04-04 PROCEDURE — 3078F DIAST BP <80 MM HG: CPT | Performed by: PHYSICIAN ASSISTANT

## 2023-04-04 PROCEDURE — G0402 INITIAL PREVENTIVE EXAM: HCPCS | Performed by: PHYSICIAN ASSISTANT

## 2023-04-04 PROCEDURE — 3074F SYST BP LT 130 MM HG: CPT | Performed by: PHYSICIAN ASSISTANT

## 2023-04-04 PROCEDURE — 93000 ELECTROCARDIOGRAM COMPLETE: CPT | Performed by: PHYSICIAN ASSISTANT

## 2023-04-04 RX ORDER — MECLIZINE HYDROCHLORIDE 25 MG/1
TABLET ORAL
Qty: 90 TABLET | Refills: 1 | Status: SHIPPED | OUTPATIENT
Start: 2023-04-04

## 2023-04-04 RX ORDER — LOSARTAN POTASSIUM AND HYDROCHLOROTHIAZIDE 25; 100 MG/1; MG/1
1 TABLET ORAL DAILY
Qty: 90 TABLET | Refills: 1 | Status: SHIPPED | OUTPATIENT
Start: 2023-04-04

## 2023-04-04 SDOH — ECONOMIC STABILITY: INCOME INSECURITY: HOW HARD IS IT FOR YOU TO PAY FOR THE VERY BASICS LIKE FOOD, HOUSING, MEDICAL CARE, AND HEATING?: NOT HARD AT ALL

## 2023-04-04 SDOH — ECONOMIC STABILITY: FOOD INSECURITY: WITHIN THE PAST 12 MONTHS, YOU WORRIED THAT YOUR FOOD WOULD RUN OUT BEFORE YOU GOT MONEY TO BUY MORE.: NEVER TRUE

## 2023-04-04 SDOH — ECONOMIC STABILITY: FOOD INSECURITY: WITHIN THE PAST 12 MONTHS, THE FOOD YOU BOUGHT JUST DIDN'T LAST AND YOU DIDN'T HAVE MONEY TO GET MORE.: NEVER TRUE

## 2023-04-04 ASSESSMENT — PATIENT HEALTH QUESTIONNAIRE - PHQ9
1. LITTLE INTEREST OR PLEASURE IN DOING THINGS: 0
SUM OF ALL RESPONSES TO PHQ QUESTIONS 1-9: 0
2. FEELING DOWN, DEPRESSED OR HOPELESS: 0
SUM OF ALL RESPONSES TO PHQ9 QUESTIONS 1 & 2: 0
SUM OF ALL RESPONSES TO PHQ QUESTIONS 1-9: 0

## 2023-04-04 ASSESSMENT — LIFESTYLE VARIABLES
HOW MANY STANDARD DRINKS CONTAINING ALCOHOL DO YOU HAVE ON A TYPICAL DAY: PATIENT DOES NOT DRINK
HOW OFTEN DO YOU HAVE A DRINK CONTAINING ALCOHOL: NEVER

## 2023-04-04 NOTE — PATIENT INSTRUCTIONS
idea to know your test results and keep a list of the medicines you take. How can you care for yourself at home? Set realistic goals. Many people expect to lose much more weight than is likely. A weight loss of 5% to 10% of your body weight may be enough to improve your health. Get family and friends involved to provide support. Talk to them about why you are trying to lose weight, and ask them to help. They can help by participating in exercise and having meals with you, even if they may be eating something different. Find what works best for you. If you do not have time or do not like to cook, a program that offers meal replacement bars or shakes may be better for you. Or if you like to prepare meals, finding a plan that includes daily menus and recipes may be best.  Ask your doctor about other health professionals who can help you achieve your weight loss goals. A dietitian can help you make healthy changes in your diet. An exercise specialist or  can help you develop a safe and effective exercise program.  A counselor or psychiatrist can help you cope with issues such as depression, anxiety, or family problems that can make it hard to focus on weight loss. Consider joining a support group for people who are trying to lose weight. Your doctor can suggest groups in your area. Where can you learn more? Go to http://www.woods.com/ and enter U357 to learn more about \"Starting a Weight Loss Plan: Care Instructions. \"  Current as of: May 9, 2022               Content Version: 13.6  © 2006-2023 [employer]. Care instructions adapted under license by [facility] [employer] [facility] ([facility]). If you have questions about a medical condition or this instruction, always ask your healthcare professional. [place] [address] any warranty or liability for your use of this information.            A Healthy Heart: Care Instructions  Your Care Instructions     Coronary artery disease, also

## 2023-04-04 NOTE — PROGRESS NOTES
Metabolic Panel, Fasting; Future  -     losartan-hydroCHLOROthiazide (HYZAAR) 100-25 MG per tablet; Take 1 tablet by mouth daily, Disp-90 tablet, R-1Normal    Mixed hyperlipidemia  -     Lipid, Fasting; Future    Prediabetes  -     Hemoglobin A1C; Future    Encounter for screening for HIV  -     HIV Screen; Future    Screening for heart disease    -     EKG 12 lead; Future  FH: heart disease  -     Ambulatory referral to Cardiology    Pneumococcal vaccination declined    Asymptomatic menopausal state  -     DEXA Bone Density Axial Skeleton; Future    Recommendations for Preventive Services Due: see orders and patient instructions/AVS.  Recommended screening schedule for the next 5-10 years is provided to the patient in written form: see Patient Instructions/AVS.     Return in about 6 months (around 10/4/2023), or f/u then AWV yearly. Subjective       Patient's complete Health Risk Assessment and screening values have been reviewed and are found in Flowsheets. The following problems were reviewed today and where indicated follow up appointments were made and/or referrals ordered. Positive Risk Factor Screenings with Interventions:                 Weight and Activity:  Physical Activity: Sufficiently Active    Days of Exercise per Week: 5 days    Minutes of Exercise per Session: 30 min     On average, how many days per week do you engage in moderate to strenuous exercise (like a brisk walk)?: 5 days  Have you lost any weight without trying in the past 3 months?: No  Body mass index: (!) 40.31  Obesity Interventions:  Patient declines any further evaluation or treatment    Obesity Counseling: Patient was asked about her current diet and exercise habits, and personalized advice was provided regarding recommended lifestyle changes. Patient's comorbid health conditions associated with elevated BMI were discussed, as well as the likely benefits of weight loss.  Based upon patient's motivation to change her behavior,

## 2023-04-05 LAB — HIV AG/AB: NONREACTIVE

## 2023-04-06 ENCOUNTER — APPOINTMENT (OUTPATIENT)
Dept: GENERAL RADIOLOGY | Age: 68
End: 2023-04-06
Payer: MEDICARE

## 2023-04-06 ENCOUNTER — TELEPHONE (OUTPATIENT)
Dept: CARDIOLOGY CLINIC | Age: 68
End: 2023-04-06

## 2023-04-06 ENCOUNTER — HOSPITAL ENCOUNTER (EMERGENCY)
Age: 68
Discharge: HOME | End: 2023-04-06
Attending: EMERGENCY MEDICINE
Payer: MEDICARE

## 2023-04-06 VITALS
RESPIRATION RATE: 17 BRPM | OXYGEN SATURATION: 96 % | HEIGHT: 62 IN | SYSTOLIC BLOOD PRESSURE: 145 MMHG | TEMPERATURE: 98 F | DIASTOLIC BLOOD PRESSURE: 73 MMHG | HEART RATE: 87 BPM | WEIGHT: 225 LBS | BODY MASS INDEX: 41.41 KG/M2

## 2023-04-06 DIAGNOSIS — R00.2 PALPITATIONS: Primary | ICD-10-CM

## 2023-04-06 DIAGNOSIS — R06.09 DYSPNEA ON EXERTION: ICD-10-CM

## 2023-04-06 LAB
ANION GAP SERPL CALCULATED.3IONS-SCNC: 14 MEQ/L (ref 9–15)
BASOPHILS # BLD: 0 K/UL (ref 0–0.1)
BASOPHILS NFR BLD: 0.6 % (ref 0.1–1.2)
BUN SERPL-MCNC: 16 MG/DL (ref 8–23)
CALCIUM SERPL-MCNC: 9.8 MG/DL (ref 8.5–9.9)
CHLORIDE SERPL-SCNC: 100 MEQ/L (ref 95–107)
CO2 SERPL-SCNC: 25 MEQ/L (ref 20–31)
CREAT SERPL-MCNC: 0.97 MG/DL (ref 0.5–0.9)
D DIMER PPP FEU-MCNC: 0.45 MG/L FEU (ref 0–0.5)
EOSINOPHIL # BLD: 0.1 K/UL (ref 0–0.4)
EOSINOPHIL NFR BLD: 1.6 % (ref 0.7–5.8)
ERYTHROCYTE [DISTWIDTH] IN BLOOD BY AUTOMATED COUNT: 12.9 % (ref 11.7–14.4)
GLUCOSE SERPL-MCNC: 103 MG/DL (ref 70–99)
HCT VFR BLD AUTO: 41.1 % (ref 37–47)
HGB BLD-MCNC: 13.7 G/DL (ref 11.2–15.7)
IMM GRANULOCYTES # BLD: 0 K/UL
IMM GRANULOCYTES NFR BLD: 0.1 %
LYMPHOCYTES # BLD: 1.7 K/UL (ref 1.2–3.7)
LYMPHOCYTES NFR BLD: 23.5 %
MCH RBC QN AUTO: 29.7 PG (ref 25.6–32.2)
MCHC RBC AUTO-ENTMCNC: 33.3 % (ref 32.2–35.5)
MCV RBC AUTO: 89 FL (ref 79.4–94.8)
MONOCYTES # BLD: 0.5 K/UL (ref 0.2–0.9)
MONOCYTES NFR BLD: 6.4 % (ref 4.7–12.5)
NEUTROPHILS # BLD: 4.8 K/UL (ref 1.6–6.1)
NEUTS SEG NFR BLD: 67.8 % (ref 34–71.1)
PLATELET # BLD AUTO: 305 K/UL (ref 182–369)
POTASSIUM SERPL-SCNC: 3.5 MEQ/L (ref 3.4–4.9)
RBC # BLD AUTO: 4.62 M/UL (ref 3.93–5.22)
SODIUM SERPL-SCNC: 139 MEQ/L (ref 135–144)
TROPONIN T SERPL-MCNC: <0.01 NG/ML (ref 0–0.01)
TROPONIN T SERPL-MCNC: <0.01 NG/ML (ref 0–0.01)
WBC # BLD AUTO: 7.1 K/UL (ref 4–10)

## 2023-04-06 PROCEDURE — 93005 ELECTROCARDIOGRAM TRACING: CPT

## 2023-04-06 PROCEDURE — 80048 BASIC METABOLIC PNL TOTAL CA: CPT

## 2023-04-06 PROCEDURE — 71045 X-RAY EXAM CHEST 1 VIEW: CPT

## 2023-04-06 PROCEDURE — 85025 COMPLETE CBC W/AUTO DIFF WBC: CPT

## 2023-04-06 PROCEDURE — 85379 FIBRIN DEGRADATION QUANT: CPT

## 2023-04-06 PROCEDURE — 2580000003 HC RX 258: Performed by: EMERGENCY MEDICINE

## 2023-04-06 PROCEDURE — 36415 COLL VENOUS BLD VENIPUNCTURE: CPT

## 2023-04-06 PROCEDURE — 84484 ASSAY OF TROPONIN QUANT: CPT

## 2023-04-06 RX ORDER — 0.9 % SODIUM CHLORIDE 0.9 %
500 INTRAVENOUS SOLUTION INTRAVENOUS ONCE
Status: COMPLETED | OUTPATIENT
Start: 2023-04-06 | End: 2023-04-06

## 2023-04-06 RX ADMIN — SODIUM CHLORIDE 500 ML: 9 INJECTION, SOLUTION INTRAVENOUS at 14:32

## 2023-04-06 ASSESSMENT — ENCOUNTER SYMPTOMS
COLOR CHANGE: 0
DIARRHEA: 0
BACK PAIN: 0
SORE THROAT: 0
COUGH: 0
NAUSEA: 0
EYE REDNESS: 0
SHORTNESS OF BREATH: 1
VOMITING: 0
ABDOMINAL PAIN: 0
EYE DISCHARGE: 0
SINUS PAIN: 0

## 2023-04-06 ASSESSMENT — PAIN - FUNCTIONAL ASSESSMENT: PAIN_FUNCTIONAL_ASSESSMENT: NONE - DENIES PAIN

## 2023-04-06 NOTE — ED NOTES
Dr. Alba Garcia PerfectServed for Dr. Michelle Kaplan (office said he is not in today, try perfectserve).      Altamease Clamp  04/06/23 9586

## 2023-04-06 NOTE — TELEPHONE ENCOUNTER
----- Message from Colton Ivey DO sent at 4/6/2023  3:16 PM EDT -----  Regarding: Please move patient appointment up  Hi    Please move this patient's appointment up to Tuesday April 18 in [place] 1 pm spot. She was seen in ER today and ER requesting we see patient sooner. She is now scheduled for end of May.      Thanks  LM

## 2023-04-06 NOTE — ED PROVIDER NOTES
fluid bolus was given. Patient resting comfortably asymptomatic. Cardiopulmonary screening work-up is negative. Patient ambulated with pulse ox and heart rate monitor maintained pulse ox 97% rate did increase to 115, patient states felt short of breath but no palpitation. Patient placed on ER observation for 3-hour troponin check    Coordination of care: A call was placed out to cardiology Dr. Fozia WILBURN did discuss case with cardiology advised if the 3-hour troponin remains negative can follow-up in the office will have his office call to arrange a sooner appointment at the [facility] office next week    FINAL IMPRESSION      1. Palpitations    2. Dyspnea on exertion          DISPOSITION/PLAN   DISPOSITION Ed Observation 04/06/2023 03:16:31 PM  Patient discharged home with family advised to rest, avoid exertional activity until cleared by cardiology. Monitor closely and return if any change or worsening any chest pain or current palpitation or shortness of breath weak or dizzy feeling. To follow-up with cardiology this upcoming week as well as primary physician in the next 2 days    PATIENT REFERRED TO:  No follow-up provider specified. DISCHARGE MEDICATIONS:  New Prescriptions    No medications on file     Controlled Substances Monitoring:     No flowsheet data found.     (Please note that portions of this note were completed with a voice recognition program.  Efforts were made to edit the dictations but occasionally words are mis-transcribed.)    Minus DO Rita (electronically signed)  Attending Emergency Physician            Minus DO Rita  04/06/23 3556

## 2023-04-06 NOTE — ED TRIAGE NOTES
Pt arrives to ED, from home, via personal vehicle-pt's spouse at her bedside. Pt states she was walking today and developed SOB and palpitations.   Pt admits she has had this happen in the past-pt saw Katy Rios, yesterday and will be seeing Cardiology at the end of May for a stress test.

## 2023-04-06 NOTE — ED NOTES
Pt and family updated on plan of care. Repeat troponin in 1 hour. Water provided. Call bell at bedside. No other needs.       Michelle Sarmiento RN  04/06/23 0835

## 2023-04-07 LAB
EKG ATRIAL RATE: 85 BPM
EKG P AXIS: 46 DEGREES
EKG P-R INTERVAL: 158 MS
EKG Q-T INTERVAL: 390 MS
EKG QRS DURATION: 98 MS
EKG QTC CALCULATION (BAZETT): 464 MS
EKG R AXIS: -2 DEGREES
EKG T AXIS: 11 DEGREES
EKG VENTRICULAR RATE: 85 BPM

## 2023-04-07 PROCEDURE — 93010 ELECTROCARDIOGRAM REPORT: CPT | Performed by: INTERNAL MEDICINE

## 2023-04-18 ENCOUNTER — OFFICE VISIT (OUTPATIENT)
Dept: CARDIOLOGY CLINIC | Age: 68
End: 2023-04-18
Payer: MEDICARE

## 2023-04-18 VITALS
HEART RATE: 84 BPM | DIASTOLIC BLOOD PRESSURE: 86 MMHG | OXYGEN SATURATION: 99 % | SYSTOLIC BLOOD PRESSURE: 136 MMHG | WEIGHT: 218 LBS | BODY MASS INDEX: 40.12 KG/M2 | HEIGHT: 62 IN

## 2023-04-18 DIAGNOSIS — R06.09 DOE (DYSPNEA ON EXERTION): ICD-10-CM

## 2023-04-18 DIAGNOSIS — I10 ESSENTIAL HYPERTENSION: Primary | ICD-10-CM

## 2023-04-18 PROCEDURE — 93000 ELECTROCARDIOGRAM COMPLETE: CPT | Performed by: INTERNAL MEDICINE

## 2023-04-18 PROCEDURE — 3075F SYST BP GE 130 - 139MM HG: CPT | Performed by: INTERNAL MEDICINE

## 2023-04-18 PROCEDURE — 1123F ACP DISCUSS/DSCN MKR DOCD: CPT | Performed by: INTERNAL MEDICINE

## 2023-04-18 PROCEDURE — 3079F DIAST BP 80-89 MM HG: CPT | Performed by: INTERNAL MEDICINE

## 2023-04-18 PROCEDURE — 99204 OFFICE O/P NEW MOD 45 MIN: CPT | Performed by: INTERNAL MEDICINE

## 2023-04-18 NOTE — PROGRESS NOTES
not on statin due to statin sensitivity  Obesity  Strong family history of heart disease      RECOMMENDATIONS:  Ms. Mary Pollock is recommended a Lexiscan Myoview stress test to evaluate for ischemia and and an echocardiogram for structural heart evaluation. If negative, cardiac status would be considered stable. If abnormal, then cardiac catheterization is warranted. The patient is agreeable to the plan. Follow up cardiac evaluation in 6 weeks, sooner if needed. Thank you very much for allowing me to participate in Ms. Goodrich's cardiac care. Should you have any questions, please do not hesitate to contact me.      Sincerely,    Ashutosh Tatum DO, [address] [facility] [address] [facility]

## 2023-05-03 ENCOUNTER — HOSPITAL ENCOUNTER (OUTPATIENT)
Dept: NON INVASIVE DIAGNOSTICS | Age: 68
Discharge: HOME | End: 2023-05-03
Payer: MEDICARE

## 2023-05-03 ENCOUNTER — HOSPITAL ENCOUNTER (OUTPATIENT)
Dept: NUCLEAR MEDICINE | Age: 68
Discharge: HOME | End: 2023-05-05
Payer: MEDICARE

## 2023-05-03 DIAGNOSIS — R06.09 DOE (DYSPNEA ON EXERTION): ICD-10-CM

## 2023-05-03 PROCEDURE — 78452 HT MUSCLE IMAGE SPECT MULT: CPT

## 2023-05-03 PROCEDURE — A9502 TC99M TETROFOSMIN: HCPCS | Performed by: INTERNAL MEDICINE

## 2023-05-03 PROCEDURE — 2580000003 HC RX 258: Performed by: INTERNAL MEDICINE

## 2023-05-03 PROCEDURE — 3430000000 HC RX DIAGNOSTIC RADIOPHARMACEUTICAL: Performed by: INTERNAL MEDICINE

## 2023-05-03 PROCEDURE — 93017 CV STRESS TEST TRACING ONLY: CPT

## 2023-05-03 PROCEDURE — 6360000002 HC RX W HCPCS: Performed by: INTERNAL MEDICINE

## 2023-05-03 RX ORDER — SODIUM CHLORIDE 0.9 % (FLUSH) 0.9 %
10 SYRINGE (ML) INJECTION PRN
Status: COMPLETED | OUTPATIENT
Start: 2023-05-03 | End: 2023-05-03

## 2023-05-03 RX ADMIN — REGADENOSON 0.4 MG: 0.08 INJECTION, SOLUTION INTRAVENOUS at 10:30

## 2023-05-03 RX ADMIN — TETROFOSMIN 11.7 MILLICURIE: 1.38 INJECTION, POWDER, LYOPHILIZED, FOR SOLUTION INTRAVENOUS at 09:08

## 2023-05-03 RX ADMIN — SODIUM CHLORIDE, PRESERVATIVE FREE 10 ML: 5 INJECTION INTRAVENOUS at 09:08

## 2023-05-03 RX ADMIN — SODIUM CHLORIDE, PRESERVATIVE FREE 10 ML: 5 INJECTION INTRAVENOUS at 10:30

## 2023-05-03 RX ADMIN — TETROFOSMIN 35.1 MILLICURIE: 1.38 INJECTION, POWDER, LYOPHILIZED, FOR SOLUTION INTRAVENOUS at 10:30

## 2023-05-03 RX ADMIN — REGADENOSON 0.4 MG: 0.08 INJECTION, SOLUTION INTRAVENOUS at 10:20

## 2023-05-03 RX ADMIN — SODIUM CHLORIDE, PRESERVATIVE FREE 10 ML: 5 INJECTION INTRAVENOUS at 10:31

## 2023-05-03 NOTE — PROGRESS NOTES
Reviewed history, allergies, and medications. Patient took her medications as normal prior to testing. Consent confirmed. Lexiscan exam explained. Placed patient on monitor.   @ Nuclear Medicine tech here to inject Demarco Morales. SOB noted during recovery phase. Denied chest pain. No ectopy noted. Doctor to further review and interpret results. Patient off monitor and instructed to eat, will have last part of exam in 1 hour.

## 2023-05-04 NOTE — PROCEDURES
[address]                      [facility], [address] [facility]                              CARDIAC STRESS TEST    PATIENT NAME: Escobar Jerry                        :        1955  MED REC NO:   [id number]                            ROOM:  ACCOUNT NO:   [de-identified]                           ADMIT DATE: 2023  PROVIDER:     Gm Bui DO    CARDIOVASCULAR DIAGNOSTIC DEPARTMENT    DATE OF STUDY:  2023    Molinda Flank MYOCARDIAL PERFUSION STRESS TEST    ORDERING PROVIDER:  Neema Ornelas DO    PRIMARY CARE PROVIDER:  Shaquille Lopez. CHANDA Victor    EXAM TYPE:  Stress Myocardial Perfusion Regadenosin 1-day. REASON FOR EXAM:  Shortness of breath. PROCEDURE DESCRIPTION:  The patient was injected intravenously at rest  with technetium-99m tetrofosmin followed by resting SPECT myocardial  perfusion imaging and then underwent stress protocol using regadenoson  0.4 mg injected intravenously. At that time, technetium-99m tetrofosmin  stress dose was injected intravenously and SPECT myocardial perfusion  and gated imaging were repeated. Rest dose:  11.7 mCi  Stress dose:  35.1 mCi    FINDINGS:  The stress and rest images exhibit homogeneous uptake of  tracer throughout the left ventricular myocardium. There is no evidence  of stress-induced reversible perfusion abnormalities to suggest  myocardial ischemia. Gated imaging exhibits normal left ventricular  size and normal wall motion and myocardial thickening. EKG analysis did  not demonstrate ST-segment changes nor arrhythmias concerning for  myocardial ischemia. LVEF:  72%  TID ratio:  0.96    IMPRESSION:  1. No evidence of stress-induced myocardial ischemia. 2.  Normal left ventricular systolic function.         Terry Pike DO    D: 2023 #8:08:54       T: 2023 8:45:37     [email or web address]  Job#: [id number]     Doc#: [id number]    CC:

## 2023-05-10 ENCOUNTER — HOSPITAL ENCOUNTER (OUTPATIENT)
Dept: WOMENS IMAGING | Age: 68
Discharge: HOME | End: 2023-05-12
Payer: MEDICARE

## 2023-05-10 DIAGNOSIS — Z78.0 ASYMPTOMATIC MENOPAUSAL STATE: ICD-10-CM

## 2023-05-10 PROCEDURE — 77080 DXA BONE DENSITY AXIAL: CPT

## 2023-05-24 ENCOUNTER — TELEPHONE (OUTPATIENT)
Dept: FAMILY MEDICINE CLINIC | Age: 68
End: 2023-05-24

## 2023-05-24 DIAGNOSIS — Z12.31 ENCOUNTER FOR SCREENING MAMMOGRAM FOR MALIGNANT NEOPLASM OF BREAST: Primary | ICD-10-CM

## 2023-06-02 ENCOUNTER — HOSPITAL ENCOUNTER (OUTPATIENT)
Dept: NON INVASIVE DIAGNOSTICS | Age: 68
Discharge: HOME | End: 2023-06-02
Payer: MEDICARE

## 2023-06-02 DIAGNOSIS — R06.09 DOE (DYSPNEA ON EXERTION): ICD-10-CM

## 2023-06-02 LAB
LV EF: 60 %
LVEF MODALITY: NORMAL

## 2023-06-02 PROCEDURE — 93306 TTE W/DOPPLER COMPLETE: CPT

## 2023-06-06 ENCOUNTER — OFFICE VISIT (OUTPATIENT)
Dept: CARDIOLOGY CLINIC | Age: 68
End: 2023-06-06
Payer: MEDICARE

## 2023-06-06 VITALS
HEART RATE: 64 BPM | WEIGHT: 208.7 LBS | BODY MASS INDEX: 38.4 KG/M2 | SYSTOLIC BLOOD PRESSURE: 120 MMHG | OXYGEN SATURATION: 99 % | DIASTOLIC BLOOD PRESSURE: 68 MMHG | HEIGHT: 62 IN

## 2023-06-06 DIAGNOSIS — R06.09 DOE (DYSPNEA ON EXERTION): Primary | ICD-10-CM

## 2023-06-06 PROCEDURE — 3078F DIAST BP <80 MM HG: CPT | Performed by: INTERNAL MEDICINE

## 2023-06-06 PROCEDURE — 3074F SYST BP LT 130 MM HG: CPT | Performed by: INTERNAL MEDICINE

## 2023-06-06 PROCEDURE — 1123F ACP DISCUSS/DSCN MKR DOCD: CPT | Performed by: INTERNAL MEDICINE

## 2023-06-06 PROCEDURE — 99213 OFFICE O/P EST LOW 20 MIN: CPT | Performed by: INTERNAL MEDICINE

## 2023-06-06 NOTE — PROGRESS NOTES
cardiac testing needed at this time. The best course of action is continued medical therapy and aggressive risk factor modification. Continue current cardiac medications. The patient is agreeable to the plan. Follow up cardiac evaluation as needed. Thank you very much for allowing me to participate in Ms. Goodrich's cardiac care. Should you have any questions, please do not hesitate to contact me.      Sincerely,    Carlene Hernandez DO, [address] [facility] [address] [facility]

## 2023-10-31 ENCOUNTER — HOSPITAL ENCOUNTER (OUTPATIENT)
Dept: WOMENS IMAGING | Age: 68
Discharge: HOME | End: 2023-11-02
Payer: COMMERCIAL

## 2023-10-31 DIAGNOSIS — Z12.31 ENCOUNTER FOR SCREENING MAMMOGRAM FOR MALIGNANT NEOPLASM OF BREAST: ICD-10-CM

## 2023-10-31 PROCEDURE — 77063 BREAST TOMOSYNTHESIS BI: CPT

## 2023-11-17 ENCOUNTER — OFFICE VISIT (OUTPATIENT)
Dept: INTERNAL MEDICINE | Age: 68
End: 2023-11-17
Payer: COMMERCIAL

## 2023-11-17 VITALS
HEIGHT: 62 IN | DIASTOLIC BLOOD PRESSURE: 72 MMHG | OXYGEN SATURATION: 98 % | BODY MASS INDEX: 34.56 KG/M2 | HEART RATE: 72 BPM | SYSTOLIC BLOOD PRESSURE: 128 MMHG | WEIGHT: 187.8 LBS

## 2023-11-17 DIAGNOSIS — I10 ESSENTIAL HYPERTENSION: Primary | ICD-10-CM

## 2023-11-17 PROCEDURE — 3078F DIAST BP <80 MM HG: CPT | Performed by: FAMILY MEDICINE

## 2023-11-17 PROCEDURE — 3074F SYST BP LT 130 MM HG: CPT | Performed by: FAMILY MEDICINE

## 2023-11-17 PROCEDURE — 1123F ACP DISCUSS/DSCN MKR DOCD: CPT | Performed by: FAMILY MEDICINE

## 2023-11-17 PROCEDURE — 99213 OFFICE O/P EST LOW 20 MIN: CPT | Performed by: FAMILY MEDICINE

## 2023-11-17 RX ORDER — LOSARTAN POTASSIUM 50 MG/1
50 TABLET ORAL DAILY
Qty: 90 TABLET | Refills: 1 | Status: SHIPPED | OUTPATIENT
Start: 2023-11-17

## 2023-11-17 ASSESSMENT — ENCOUNTER SYMPTOMS
DIARRHEA: 0
WHEEZING: 0
SHORTNESS OF BREATH: 0
ABDOMINAL PAIN: 0
SORE THROAT: 0
CONSTIPATION: 0
COUGH: 0
RHINORRHEA: 0

## 2023-11-17 NOTE — PROGRESS NOTES
[Amoxicillin-Pot Clavulanate]     Erythromycin     Lovastatin      Leg pain/cramping      Penicillins     Ciprofloxacin Rash     Current Outpatient Medications   Medication Sig Dispense Refill    losartan (COZAAR) 50 MG tablet Take 1 tablet by mouth daily 90 tablet 1    meclizine (ANTIVERT) 25 MG tablet take 1 tablet by mouth three times a day if needed for dizziness 90 tablet 1    aspirin 81 MG chewable tablet Take 1 tablet by mouth daily       No current facility-administered medications for this visit. Vitals:    11/17/23 1117   BP: 128/72   Site: Right Upper Arm   Position: Sitting   Cuff Size: Large Adult   Pulse: 72   SpO2: 98%   Weight: 85.2 kg (187 lb 12.8 oz)   Height: 1.575 m (5' 2\")       Physical exam:  Physical Exam  Vitals reviewed. Constitutional:       General: She is not in acute distress. Appearance: She is well-developed. HENT:      Head: Normocephalic and atraumatic. Cardiovascular:      Rate and Rhythm: Normal rate. Pulmonary:      Effort: Pulmonary effort is normal. No respiratory distress. Musculoskeletal:      Cervical back: Normal range of motion. Skin:     General: Skin is warm and dry. Neurological:      Mental Status: She is alert and oriented to person, place, and time. Psychiatric:         Attention and Perception: Attention normal.         Behavior: Behavior normal.         Assessment/Plan:  79 y.o. female here mainly for HTN:  - HTN: due to the excellent wt loss, likely needed less meds; replaced hyzaar with losartan 50mg qD     Diagnosis Orders   1. Essential hypertension  losartan (COZAAR) 50 MG tablet           Return if symptoms worsen or fail to improve.     Greg Rincon MD

## 2023-12-04 DIAGNOSIS — I10 ESSENTIAL HYPERTENSION: ICD-10-CM

## 2023-12-05 RX ORDER — LOSARTAN POTASSIUM AND HYDROCHLOROTHIAZIDE 25; 100 MG/1; MG/1
1 TABLET ORAL DAILY
Qty: 90 TABLET | Refills: 1 | OUTPATIENT
Start: 2023-12-05

## 2024-04-02 RX ORDER — MECLIZINE HYDROCHLORIDE 25 MG/1
TABLET ORAL
Qty: 90 TABLET | Refills: 1 | Status: SHIPPED | OUTPATIENT
Start: 2024-04-02

## 2024-04-02 NOTE — TELEPHONE ENCOUNTER
Comments:     Last Office Visit (last PCP visit):   4/4/2023    Next Visit Date:  No future appointments.    **If hasn't been seen in over a year OR hasn't followed up according to last diabetes/ADHD visit, make appointment for patient before sending refill to provider.    Rx requested:  Requested Prescriptions     Pending Prescriptions Disp Refills    meclizine (ANTIVERT) 25 MG tablet [Pharmacy Med Name: MECLIZINE 25 MG TABLET] 90 tablet 1     Sig: take 1 tablet by mouth three times a day if needed for dizziness

## 2024-04-29 DIAGNOSIS — I10 ESSENTIAL HYPERTENSION: ICD-10-CM

## 2024-04-29 NOTE — TELEPHONE ENCOUNTER
Comments:     Last Office Visit (last PCP visit):   11/17/2023    Next Visit Date:  No future appointments.    **If hasn't been seen in over a year OR hasn't followed up according to last diabetes/ADHD visit, make appointment for patient before sending refill to provider.    Rx requested:  Requested Prescriptions     Pending Prescriptions Disp Refills    losartan (COZAAR) 50 MG tablet 90 tablet 1     Sig: Take 1 tablet by mouth daily

## 2024-04-30 RX ORDER — LOSARTAN POTASSIUM 50 MG/1
50 TABLET ORAL DAILY
Qty: 90 TABLET | Refills: 1 | Status: SHIPPED | OUTPATIENT
Start: 2024-04-30

## 2024-12-04 DIAGNOSIS — I10 ESSENTIAL HYPERTENSION: ICD-10-CM

## 2024-12-04 RX ORDER — LOSARTAN POTASSIUM 50 MG/1
50 TABLET ORAL DAILY
Qty: 90 TABLET | Refills: 0 | OUTPATIENT
Start: 2024-12-04

## 2024-12-04 NOTE — TELEPHONE ENCOUNTER
Comments:     Last Office Visit (last PCP visit):   11/17/2023    Next Visit Date:  No future appointments.    **If hasn't been seen in over a year OR hasn't followed up according to last diabetes/ADHD visit, make appointment for patient before sending refill to provider.    Rx requested:  Requested Prescriptions     Pending Prescriptions Disp Refills    losartan (COZAAR) 50 MG tablet [Pharmacy Med Name: losartan 50 mg tablet] 90 tablet 0     Sig: TAKE 1 TABLET BY MOUTH DAILY

## 2024-12-05 DIAGNOSIS — I10 ESSENTIAL HYPERTENSION: ICD-10-CM

## 2024-12-06 RX ORDER — LOSARTAN POTASSIUM 50 MG/1
50 TABLET ORAL DAILY
Qty: 30 TABLET | Refills: 0 | Status: SHIPPED | OUTPATIENT
Start: 2024-12-06

## 2025-01-02 SDOH — HEALTH STABILITY: PHYSICAL HEALTH: ON AVERAGE, HOW MANY DAYS PER WEEK DO YOU ENGAGE IN MODERATE TO STRENUOUS EXERCISE (LIKE A BRISK WALK)?: 5 DAYS

## 2025-01-02 SDOH — HEALTH STABILITY: PHYSICAL HEALTH: ON AVERAGE, HOW MANY MINUTES DO YOU ENGAGE IN EXERCISE AT THIS LEVEL?: 150+ MIN

## 2025-01-02 ASSESSMENT — LIFESTYLE VARIABLES
HOW OFTEN DO YOU HAVE SIX OR MORE DRINKS ON ONE OCCASION: 1
HOW MANY STANDARD DRINKS CONTAINING ALCOHOL DO YOU HAVE ON A TYPICAL DAY: 0
HOW OFTEN DO YOU HAVE A DRINK CONTAINING ALCOHOL: NEVER
HOW OFTEN DO YOU HAVE A DRINK CONTAINING ALCOHOL: 1
HOW MANY STANDARD DRINKS CONTAINING ALCOHOL DO YOU HAVE ON A TYPICAL DAY: PATIENT DOES NOT DRINK

## 2025-01-02 ASSESSMENT — PATIENT HEALTH QUESTIONNAIRE - PHQ9
1. LITTLE INTEREST OR PLEASURE IN DOING THINGS: NOT AT ALL
SUM OF ALL RESPONSES TO PHQ QUESTIONS 1-9: 0
SUM OF ALL RESPONSES TO PHQ QUESTIONS 1-9: 0
2. FEELING DOWN, DEPRESSED OR HOPELESS: NOT AT ALL
SUM OF ALL RESPONSES TO PHQ QUESTIONS 1-9: 0
SUM OF ALL RESPONSES TO PHQ QUESTIONS 1-9: 0
SUM OF ALL RESPONSES TO PHQ9 QUESTIONS 1 & 2: 0

## 2025-01-05 SDOH — ECONOMIC STABILITY: INCOME INSECURITY: HOW HARD IS IT FOR YOU TO PAY FOR THE VERY BASICS LIKE FOOD, HOUSING, MEDICAL CARE, AND HEATING?: NOT HARD AT ALL

## 2025-01-05 SDOH — ECONOMIC STABILITY: FOOD INSECURITY: WITHIN THE PAST 12 MONTHS, YOU WORRIED THAT YOUR FOOD WOULD RUN OUT BEFORE YOU GOT MONEY TO BUY MORE.: NEVER TRUE

## 2025-01-05 SDOH — ECONOMIC STABILITY: FOOD INSECURITY: WITHIN THE PAST 12 MONTHS, THE FOOD YOU BOUGHT JUST DIDN'T LAST AND YOU DIDN'T HAVE MONEY TO GET MORE.: NEVER TRUE

## 2025-01-08 ENCOUNTER — HOSPITAL ENCOUNTER (OUTPATIENT)
Age: 70
Setting detail: SPECIMEN
Discharge: HOME | End: 2025-01-08
Payer: COMMERCIAL

## 2025-01-08 ENCOUNTER — OFFICE VISIT (OUTPATIENT)
Dept: FAMILY MEDICINE CLINIC | Age: 70
End: 2025-01-08

## 2025-01-08 VITALS
HEIGHT: 62 IN | SYSTOLIC BLOOD PRESSURE: 142 MMHG | WEIGHT: 189 LBS | DIASTOLIC BLOOD PRESSURE: 80 MMHG | OXYGEN SATURATION: 98 % | BODY MASS INDEX: 34.78 KG/M2 | HEART RATE: 76 BPM

## 2025-01-08 DIAGNOSIS — Z12.31 ENCOUNTER FOR SCREENING MAMMOGRAM FOR MALIGNANT NEOPLASM OF BREAST: Primary | ICD-10-CM

## 2025-01-08 DIAGNOSIS — Z00.00 INITIAL MEDICARE ANNUAL WELLNESS VISIT: ICD-10-CM

## 2025-01-08 DIAGNOSIS — Z11.59 ENCOUNTER FOR HEPATITIS C SCREENING TEST FOR LOW RISK PATIENT: ICD-10-CM

## 2025-01-08 DIAGNOSIS — R73.03 PREDIABETES: ICD-10-CM

## 2025-01-08 DIAGNOSIS — I10 ESSENTIAL HYPERTENSION: ICD-10-CM

## 2025-01-08 DIAGNOSIS — R42 DIZZINESS: ICD-10-CM

## 2025-01-08 PROBLEM — Z28.21 PNEUMOCOCCAL VACCINATION DECLINED: Status: RESOLVED | Noted: 2023-04-04 | Resolved: 2025-01-08

## 2025-01-08 LAB
ALBUMIN SERPL-MCNC: 4.4 G/DL (ref 3.5–4.6)
ALP SERPL-CCNC: 101 U/L (ref 40–130)
ALT SERPL-CCNC: <5 U/L (ref 0–33)
ANION GAP SERPL CALCULATED.3IONS-SCNC: 12 MEQ/L (ref 9–15)
AST SERPL-CCNC: 8 U/L (ref 0–35)
BILIRUB SERPL-MCNC: 0.3 MG/DL (ref 0.2–0.7)
BUN SERPL-MCNC: 24 MG/DL (ref 8–23)
CALCIUM SERPL-MCNC: 9.6 MG/DL (ref 8.5–9.9)
CHLORIDE SERPL-SCNC: 104 MEQ/L (ref 95–107)
CHOLEST SERPL-MCNC: 254 MG/DL (ref 0–199)
CO2 SERPL-SCNC: 25 MEQ/L (ref 20–31)
CREAT SERPL-MCNC: 0.82 MG/DL (ref 0.5–0.9)
GLOBULIN SER CALC-MCNC: 3.1 G/DL (ref 2.3–3.5)
GLUCOSE FASTING: 80 MG/DL (ref 70–99)
HDLC SERPL-MCNC: 80 MG/DL (ref 40–59)
LDL CHOLESTEROL: 156 MG/DL (ref 0–129)
POTASSIUM SERPL-SCNC: 4.8 MEQ/L (ref 3.4–4.9)
PROT SERPL-MCNC: 7.5 G/DL (ref 6.3–8)
SODIUM SERPL-SCNC: 141 MEQ/L (ref 135–144)
TRIGLYCERIDE, FASTING: 88 MG/DL (ref 0–150)

## 2025-01-08 PROCEDURE — 80061 LIPID PANEL: CPT

## 2025-01-08 PROCEDURE — 80053 COMPREHEN METABOLIC PANEL: CPT

## 2025-01-08 PROCEDURE — 86803 HEPATITIS C AB TEST: CPT

## 2025-01-08 RX ORDER — LOSARTAN POTASSIUM 50 MG/1
50 TABLET ORAL DAILY
Qty: 90 TABLET | Refills: 3 | Status: SHIPPED | OUTPATIENT
Start: 2025-01-08 | End: 2025-01-09

## 2025-01-08 RX ORDER — MECLIZINE HYDROCHLORIDE 25 MG/1
TABLET ORAL
Qty: 90 TABLET | Refills: 0 | Status: SHIPPED | OUTPATIENT
Start: 2025-01-08

## 2025-01-08 NOTE — PROGRESS NOTES
Medicare Annual Wellness Visit    Mari Goodrich is here for Medicare AWV (Patient is fasting today. Had colonoscopy by Dr. العراقي about 8 years ago. Denies flu vaccine.)      HTN: didn't take her BP meds today        Assessment & Plan   Encounter for screening mammogram for malignant neoplasm of breast  -     SHERRY HANNAH DIGITAL SCREEN BILATERAL; Future  Essential hypertension  -     losartan (COZAAR) 50 MG tablet; Take 1 tablet by mouth daily, Disp-90 tablet, R-3Normal  Initial Medicare annual wellness visit  -     Lipid, Fasting; Future  -     Comprehensive Metabolic Panel, Fasting; Future  Dizziness  -     meclizine (ANTIVERT) 25 MG tablet; take 1 tablet by mouth three times a day if needed for dizziness, Disp-90 tablet, R-0Normal  Prediabetes  -     Hemoglobin A1C; Future  Encounter for hepatitis C screening test for low risk patient  -     Hepatitis C Antibody; Future       No follow-ups on file.     Subjective       Patient's complete Health Risk Assessment and screening values have been reviewed and are found in Flowsheets. The following problems were reviewed today and where indicated follow up appointments were made and/or referrals ordered.    Positive Risk Factor Screenings with Interventions:                Abnormal BMI (obese):  Body mass index is 34.57 kg/m². (!) Abnormal  Interventions:  See AVS for additional education material  See A/P for plan and any pertinent orders           Safety:  Do you have working smoke detectors?: (!) No  Interventions:  See AVS for additional education material  See A/P for plan and any pertinent orders                   Objective   Vitals:    01/08/25 0923 01/08/25 1017   BP: (!) 144/84 (!) 142/80   Pulse: 76    SpO2: 98%    Weight: 85.7 kg (189 lb)    Height: 1.575 m (5' 2\")       Body mass index is 34.57 kg/m².                    Allergies   Allergen Reactions    Augmentin [Amoxicillin-Pot Clavulanate]     Erythromycin     Lovastatin      Leg pain/cramping      Penicillins

## 2025-01-08 NOTE — PATIENT INSTRUCTIONS
Starting a Weight Loss Plan: Care Instructions  Overview     It can be a challenge to lose weight. But your doctor can help you make a weight-loss plan that meets your needs.  You don't have to make a lot of big changes at once. A better idea might be to focus on small changes and stick with them. When those changes become habit, you can add a few more changes.  Some people find it helpful to take an exercise or nutrition class. If you have questions, ask your doctor about seeing a registered dietitian or an exercise specialist. You might also think about joining a weight-loss support group.  If you're not ready to make changes right now, try to pick a date in the future. Then make an appointment with your doctor to talk about when and how you'll get started with a plan.  Follow-up care is a key part of your treatment and safety. Be sure to make and go to all appointments, and call your doctor if you are having problems. It's also a good idea to know your test results and keep a list of the medicines you take.  How can you care for yourself at home?  Set realistic goals. Many people expect to lose much more weight than is likely. A weight loss of 5% to 10% of your body weight may be enough to improve your health.  Get family and friends involved to provide support. Talk to them about why you are trying to lose weight, and ask them to help. They can help by participating in exercise and having meals with you, even if they may be eating something different.  Find what works best for you. If you do not have time or do not like to cook, a program that offers meal replacement bars or shakes may be better for you. Or if you like to prepare meals, finding a plan that includes daily menus and recipes may be best.  Ask your doctor about other health professionals who can help you achieve your weight loss goals.  A dietitian can help you make healthy changes in your diet.  An exercise specialist or  can 
  COVID-19 PCR test completed. Patient handout For Patients Who Have Been Tested for Covid-19 (Coronavirus) was given to the patient, which includes test result notification process.  
,[email or web address]_Unknown,[email or web address]

## 2025-01-09 ENCOUNTER — TELEPHONE (OUTPATIENT)
Dept: FAMILY MEDICINE CLINIC | Age: 70
End: 2025-01-09

## 2025-01-09 DIAGNOSIS — I10 ESSENTIAL HYPERTENSION: ICD-10-CM

## 2025-01-09 LAB — HEPATITIS C ANTIBODY: NONREACTIVE

## 2025-01-09 RX ORDER — LOSARTAN POTASSIUM 100 MG/1
100 TABLET ORAL DAILY
Qty: 90 TABLET | Refills: 1 | Status: SHIPPED | OUTPATIENT
Start: 2025-01-09

## 2025-01-09 NOTE — TELEPHONE ENCOUNTER
Looks like the BP is staying high. I would like you to take two of the 50mg losartan at a time to make 100mg. I think this should get your BP closer to 120/80. I've sent in some 100mg losartan to the pharmacy.

## 2025-01-09 NOTE — TELEPHONE ENCOUNTER
Patient called in with her BP readings;      144/77- 10:45AM-1/9/2025 2.5 hours after taking BP medication.     149/84- 1/8/2025- 1 hour after taking BP medication.

## 2025-01-22 ENCOUNTER — HOSPITAL ENCOUNTER (OUTPATIENT)
Dept: WOMENS IMAGING | Age: 70
Discharge: HOME | End: 2025-01-24
Payer: COMMERCIAL

## 2025-01-22 DIAGNOSIS — Z12.31 ENCOUNTER FOR SCREENING MAMMOGRAM FOR MALIGNANT NEOPLASM OF BREAST: ICD-10-CM

## 2025-01-22 PROCEDURE — 77063 BREAST TOMOSYNTHESIS BI: CPT

## 2025-05-16 ENCOUNTER — OFFICE VISIT (OUTPATIENT)
Dept: FAMILY MEDICINE CLINIC | Age: 70
End: 2025-05-16
Payer: COMMERCIAL

## 2025-05-16 ENCOUNTER — TELEPHONE (OUTPATIENT)
Dept: FAMILY MEDICINE CLINIC | Age: 70
End: 2025-05-16

## 2025-05-16 VITALS
RESPIRATION RATE: 16 BRPM | TEMPERATURE: 97.4 F | OXYGEN SATURATION: 95 % | DIASTOLIC BLOOD PRESSURE: 82 MMHG | HEART RATE: 89 BPM | BODY MASS INDEX: 35.48 KG/M2 | HEIGHT: 62 IN | SYSTOLIC BLOOD PRESSURE: 144 MMHG | WEIGHT: 192.8 LBS

## 2025-05-16 DIAGNOSIS — M54.41 CHRONIC RIGHT-SIDED LOW BACK PAIN WITH RIGHT-SIDED SCIATICA: Primary | ICD-10-CM

## 2025-05-16 DIAGNOSIS — G89.29 CHRONIC RIGHT-SIDED LOW BACK PAIN WITH RIGHT-SIDED SCIATICA: Primary | ICD-10-CM

## 2025-05-16 PROCEDURE — 3078F DIAST BP <80 MM HG: CPT | Performed by: FAMILY MEDICINE

## 2025-05-16 PROCEDURE — 3077F SYST BP >= 140 MM HG: CPT | Performed by: FAMILY MEDICINE

## 2025-05-16 PROCEDURE — 1123F ACP DISCUSS/DSCN MKR DOCD: CPT | Performed by: FAMILY MEDICINE

## 2025-05-16 PROCEDURE — 99213 OFFICE O/P EST LOW 20 MIN: CPT | Performed by: FAMILY MEDICINE

## 2025-05-16 PROCEDURE — 1159F MED LIST DOCD IN RCRD: CPT | Performed by: FAMILY MEDICINE

## 2025-05-16 RX ORDER — METHYLPREDNISOLONE 4 MG/1
TABLET ORAL
Qty: 1 KIT | Refills: 0 | Status: SHIPPED | OUTPATIENT
Start: 2025-05-16 | End: 2025-05-22

## 2025-05-16 RX ORDER — M-VIT,TX,IRON,MINS/CALC/FOLIC 27MG-0.4MG
1 TABLET ORAL DAILY
COMMUNITY

## 2025-05-16 RX ORDER — IBUPROFEN 200 MG
200 TABLET ORAL EVERY 6 HOURS PRN
COMMUNITY

## 2025-05-16 RX ORDER — VIT C/B6/B5/MAGNESIUM/HERB 173 50-5-6-5MG
500 CAPSULE ORAL DAILY
COMMUNITY

## 2025-05-16 NOTE — TELEPHONE ENCOUNTER
Chiropractor has been treating her sciatica pain for several months now but states they have done about all they can do. Said chiropractor suggested that she request primary care to prescribe a steroid pack of prednisone.    Patient asking if this can be called in or if she would have to be seen in the office.

## 2025-05-16 NOTE — PROGRESS NOTES
[facility] PRIMARY [facility]  [address] [place] [id number]  Dept: [phone]  Dept Fax: [phone]     Chief Complaint:  Chief Complaint   Patient presents with    Back Pain     Right sided lower back pain with radiating pain into the back of the right leg X2-3 months. Taking  q6h. Pain at nighttime has been unbearable. Patient is interested in starting on a steroid for the pain. Has been seeing Dr.Pfeiffer sen.        Vitals:    05/16/25 1417   BP: (!) 164/78   BP Site: Left Upper Arm   Patient Position: Sitting   BP Cuff Size: Large Adult   Pulse: 89   Resp: 16   Temp: 97.4 °F (36.3 °C)   TempSrc: Infrared   SpO2: 95%   Weight: 87.5 kg (192 lb 12.8 oz)   Height: 1.575 m (5' 2.01\")       HPI:  70 y.o.female who presents for the following:      Back pain: hx of lumbar DDD; seeing chiropractor but after 3mo still inadequate pain relief; pain radiates down the posterior RLE; keeps her up at night; worse this past month; taking ibuprofen 600mg q6hr and some times adds 500mg tylenol; icing; walking without relief; interested in a steroid for this.    -----------------------------------------------------------------------------    Assessment/Plan:  70 y.o. female here mainly for the following:  LBP with RLE sciatica  Trial of PO steroid  If not adequate, offered referral to pain/spine clinic  Could also consider cymbalta, gabapentin, lyrica, topical lidocaine/capsaicin        ICD-10-CM    1. Chronic right-sided low back pain with right-sided sciatica  M54.41 methylPREDNISolone (MEDROL DOSEPACK) 4 MG tablet    G89.29           Return if symptoms worsen or fail to improve.    Elvis Moore MD      -----------------------------------------------------------------------------      Objective     Physical Exam:  Physical Exam  Vitals reviewed.   Constitutional:       General: She is not in acute distress.     Appearance: She is well-developed.   HENT:      Head: Normocephalic and atraumatic.

## 2025-05-20 ENCOUNTER — TELEPHONE (OUTPATIENT)
Dept: FAMILY MEDICINE CLINIC | Age: 70
End: 2025-05-20

## 2025-05-20 DIAGNOSIS — G89.29 CHRONIC RIGHT-SIDED LOW BACK PAIN WITH RIGHT-SIDED SCIATICA: Primary | ICD-10-CM

## 2025-05-20 DIAGNOSIS — M54.41 CHRONIC RIGHT-SIDED LOW BACK PAIN WITH RIGHT-SIDED SCIATICA: Primary | ICD-10-CM

## 2025-05-20 RX ORDER — GABAPENTIN 300 MG/1
CAPSULE ORAL
Qty: 99 CAPSULE | Refills: 1 | Status: SHIPPED | OUTPATIENT
Start: 2025-05-20 | End: 2025-06-25

## 2025-05-20 NOTE — TELEPHONE ENCOUNTER
Patient states that tylenol is not helping and wonders if she can have anything stronger. Also is asking for another round of anti inflammatory medications. Tylenol takes an hour to kick in, and then lasts 2-3 hours before the pain returns. Painful to sit.

## 2025-05-20 NOTE — TELEPHONE ENCOUNTER
Gabapentin is a med that takes time to build in the system. I've ordered and included instruction on how to start it. This will be a low dose but we can raise the dose in the future if you tolerate it well.

## 2025-05-20 NOTE — TELEPHONE ENCOUNTER
You should still be on the steroid taper. Have you been taking it according to the instructions?  I have some options including gabapentin, topicals (lidocaine/capsaicin), cymbalta. Another option would be to see the spine/pain doctor. There is one in [place]. Do you have a preference.

## 2025-05-29 ENCOUNTER — PATIENT MESSAGE (OUTPATIENT)
Dept: FAMILY MEDICINE CLINIC | Age: 70
End: 2025-05-29

## 2025-05-29 DIAGNOSIS — G89.29 CHRONIC RIGHT-SIDED LOW BACK PAIN WITH RIGHT-SIDED SCIATICA: Primary | ICD-10-CM

## 2025-05-29 DIAGNOSIS — M54.41 CHRONIC RIGHT-SIDED LOW BACK PAIN WITH RIGHT-SIDED SCIATICA: Primary | ICD-10-CM

## 2025-06-11 ENCOUNTER — TRANSCRIBE ORDERS (OUTPATIENT)
Dept: ADMINISTRATIVE | Age: 70
End: 2025-06-11

## 2025-06-11 DIAGNOSIS — M54.40 LUMBAGO OF LUMBAR REGION WITH SCIATICA: Primary | ICD-10-CM

## 2025-06-23 ENCOUNTER — HOSPITAL ENCOUNTER (OUTPATIENT)
Dept: CT IMAGING | Age: 70
Discharge: HOME | End: 2025-06-25
Attending: NEUROLOGICAL SURGERY
Payer: COMMERCIAL

## 2025-06-23 DIAGNOSIS — M54.40 LUMBAGO OF LUMBAR REGION WITH SCIATICA: ICD-10-CM

## 2025-06-23 PROCEDURE — 72131 CT LUMBAR SPINE W/O DYE: CPT

## 2025-06-26 ENCOUNTER — INITIAL CONSULT (OUTPATIENT)
Age: 70
End: 2025-06-26
Payer: COMMERCIAL

## 2025-06-26 VITALS
BODY MASS INDEX: 34.96 KG/M2 | HEIGHT: 62 IN | DIASTOLIC BLOOD PRESSURE: 84 MMHG | SYSTOLIC BLOOD PRESSURE: 150 MMHG | WEIGHT: 190 LBS | OXYGEN SATURATION: 96 % | TEMPERATURE: 98.2 F | HEART RATE: 97 BPM

## 2025-06-26 DIAGNOSIS — G89.4 CHRONIC PAIN SYNDROME: Primary | ICD-10-CM

## 2025-06-26 DIAGNOSIS — M48.061 SPINAL STENOSIS OF LUMBAR REGION, UNSPECIFIED WHETHER NEUROGENIC CLAUDICATION PRESENT: ICD-10-CM

## 2025-06-26 PROCEDURE — 3077F SYST BP >= 140 MM HG: CPT | Performed by: PAIN MEDICINE

## 2025-06-26 PROCEDURE — 1125F AMNT PAIN NOTED PAIN PRSNT: CPT | Performed by: PAIN MEDICINE

## 2025-06-26 PROCEDURE — 3079F DIAST BP 80-89 MM HG: CPT | Performed by: PAIN MEDICINE

## 2025-06-26 PROCEDURE — 99204 OFFICE O/P NEW MOD 45 MIN: CPT | Performed by: PAIN MEDICINE

## 2025-06-26 PROCEDURE — 1159F MED LIST DOCD IN RCRD: CPT | Performed by: PAIN MEDICINE

## 2025-06-26 PROCEDURE — 1123F ACP DISCUSS/DSCN MKR DOCD: CPT | Performed by: PAIN MEDICINE

## 2025-06-26 RX ORDER — HYDROCODONE BITARTRATE AND ACETAMINOPHEN 5; 325 MG/1; MG/1
1 TABLET ORAL 3 TIMES DAILY PRN
Qty: 90 TABLET | Refills: 0 | Status: ON HOLD | OUTPATIENT
Start: 2025-06-26 | End: 2025-07-26

## 2025-06-26 NOTE — PROGRESS NOTES
[facility]  P: [phone]  F: [phone]        Mari Goodrich  (1955)    6/26/2025    Subjective:     Mari Goodrich is 70 y.o. female who complains today of:     Chief Complaint   Patient presents with    Consultation    Back Pain     Patient here today for initial valuation.  She has pain in her right side of her low back down her leg.  It has been going on for 6 months.  No back surgery she is not diabetic she is not on blood thinners currently does not work at physical jobs on her feet in the past.  Standing is best.  Although sitting standing bending laying down also bothers does not smoke.  She does have a CT scan ordered by Dr. Rausch  she has significant stenosis L3-4 along with lytic lesion in the right aspect of the sacrum.  He apparently has ordered a stat MRI.  Tylenol Motrin do not help for pain.  Gabapentin makes her sleepy.    Assessment: Chronic pain cervical lumbar stenosis, lytic lesion on sacrum, unclear etiology    Plan: We discussed options we will hold off on injections till we know what the MRI shows in detail.  She will sign narcotic drug policy risk-benefit discussed.  Will check urine drug screen she denies any illicits or alcohol.  [place] was reviewed.  Will give her Norco 3 times a day 5 mg 3 #90 follow-up 1 month she will let us know what the lumbar spine MRI shows and what 's plan once the MRI is done.    Allergies:  Augmentin [amoxicillin-pot clavulanate], Erythromycin, Lovastatin, Penicillins, and Ciprofloxacin    Past Medical History:   Diagnosis Date    Asthma 1994    Breast cancer (HCC)     Diabetes mellitus (HCC) 2020    History of therapeutic radiation     Hx antineoplastic chemo     Hypertension     Obesity      Past Surgical History:   Procedure Laterality Date    BREAST BIOPSY      BREAST LUMPECTOMY      BREAST REDUCTION SURGERY      HYSTERECTOMY (CERVIX STATUS UNKNOWN)      HYSTERECTOMY,

## 2025-06-27 ENCOUNTER — APPOINTMENT (OUTPATIENT)
Dept: CT IMAGING | Age: 70
DRG: 551 | End: 2025-06-27
Payer: COMMERCIAL

## 2025-06-27 ENCOUNTER — APPOINTMENT (OUTPATIENT)
Dept: GENERAL RADIOLOGY | Age: 70
DRG: 551 | End: 2025-06-27
Payer: COMMERCIAL

## 2025-06-27 ENCOUNTER — HOSPITAL ENCOUNTER (INPATIENT)
Age: 70
LOS: 5 days | Discharge: HOME | DRG: 551 | End: 2025-07-02
Attending: STUDENT IN AN ORGANIZED HEALTH CARE EDUCATION/TRAINING PROGRAM | Admitting: STUDENT IN AN ORGANIZED HEALTH CARE EDUCATION/TRAINING PROGRAM
Payer: COMMERCIAL

## 2025-06-27 DIAGNOSIS — G89.3 PAIN FROM BONE METASTASES (HCC): ICD-10-CM

## 2025-06-27 DIAGNOSIS — M54.59 INTRACTABLE LOW BACK PAIN: Primary | ICD-10-CM

## 2025-06-27 DIAGNOSIS — M53.3 SACRAL MASS: ICD-10-CM

## 2025-06-27 DIAGNOSIS — J18.9 PNEUMONIA OF LEFT LOWER LOBE DUE TO INFECTIOUS ORGANISM: ICD-10-CM

## 2025-06-27 DIAGNOSIS — C79.51 PAIN FROM BONE METASTASES (HCC): ICD-10-CM

## 2025-06-27 DIAGNOSIS — Z85.3 HISTORY OF LEFT BREAST CANCER: ICD-10-CM

## 2025-06-27 PROBLEM — M54.16 LUMBAR RADICULITIS: Status: ACTIVE | Noted: 2025-06-27

## 2025-06-27 LAB
ALBUMIN SERPL-MCNC: 4.8 G/DL (ref 3.5–4.6)
ALP SERPL-CCNC: 122 U/L (ref 40–130)
ALT SERPL-CCNC: 10 U/L (ref 0–33)
ANION GAP SERPL CALCULATED.3IONS-SCNC: 17 MEQ/L (ref 9–15)
AST SERPL-CCNC: 22 U/L (ref 0–35)
BASOPHILS # BLD: 0 K/UL (ref 0–0.2)
BASOPHILS NFR BLD: 0.4 %
BILIRUB SERPL-MCNC: 0.4 MG/DL (ref 0.2–0.7)
BUN SERPL-MCNC: 23 MG/DL (ref 8–23)
CALCIUM SERPL-MCNC: 10 MG/DL (ref 8.5–9.9)
CHLORIDE SERPL-SCNC: 101 MEQ/L (ref 95–107)
CO2 SERPL-SCNC: 19 MEQ/L (ref 20–31)
CREAT SERPL-MCNC: 0.71 MG/DL (ref 0.5–0.9)
EKG ATRIAL RATE: 88 BPM
EKG DIAGNOSIS: NORMAL
EKG P AXIS: 57 DEGREES
EKG P-R INTERVAL: 142 MS
EKG Q-T INTERVAL: 376 MS
EKG QRS DURATION: 92 MS
EKG QTC CALCULATION (BAZETT): 454 MS
EKG R AXIS: 0 DEGREES
EKG T AXIS: 29 DEGREES
EKG VENTRICULAR RATE: 88 BPM
EOSINOPHIL # BLD: 0.1 K/UL (ref 0–0.7)
EOSINOPHIL NFR BLD: 0.7 %
ERYTHROCYTE [DISTWIDTH] IN BLOOD BY AUTOMATED COUNT: 13.6 % (ref 11.5–14.5)
GLOBULIN SER CALC-MCNC: 3.1 G/DL (ref 2.3–3.5)
GLUCOSE SERPL-MCNC: 107 MG/DL (ref 70–99)
HCT VFR BLD AUTO: 39.5 % (ref 37–47)
HGB BLD-MCNC: 13.3 G/DL (ref 12–16)
LYMPHOCYTES # BLD: 1.3 K/UL (ref 1–4.8)
LYMPHOCYTES NFR BLD: 15.7 %
MCH RBC QN AUTO: 31.6 PG (ref 27–31.3)
MCHC RBC AUTO-ENTMCNC: 33.7 % (ref 33–37)
MCV RBC AUTO: 93.8 FL (ref 79.4–94.8)
MONOCYTES # BLD: 0.6 K/UL (ref 0.2–0.8)
MONOCYTES NFR BLD: 7.4 %
NEUTROPHILS # BLD: 6.5 K/UL (ref 1.4–6.5)
NEUTS SEG NFR BLD: 75.6 %
PLATELET # BLD AUTO: 323 K/UL (ref 130–400)
POC CREATININE WHOLE BLOOD: 0.8
POTASSIUM SERPL-SCNC: 4 MEQ/L (ref 3.4–4.9)
PROT SERPL-MCNC: 7.9 G/DL (ref 6.3–8)
RBC # BLD AUTO: 4.21 M/UL (ref 4.2–5.4)
SODIUM SERPL-SCNC: 137 MEQ/L (ref 135–144)
WBC # BLD AUTO: 8.5 K/UL (ref 4.8–10.8)

## 2025-06-27 PROCEDURE — 2580000003 HC RX 258: Performed by: PAIN MEDICINE

## 2025-06-27 PROCEDURE — 7100000010 HC PHASE II RECOVERY - FIRST 15 MIN: Performed by: PAIN MEDICINE

## 2025-06-27 PROCEDURE — 36415 COLL VENOUS BLD VENIPUNCTURE: CPT

## 2025-06-27 PROCEDURE — 85025 COMPLETE CBC W/AUTO DIFF WBC: CPT

## 2025-06-27 PROCEDURE — 6360000002 HC RX W HCPCS: Performed by: PAIN MEDICINE

## 2025-06-27 PROCEDURE — 2709999900 HC NON-CHARGEABLE SUPPLY: Performed by: PAIN MEDICINE

## 2025-06-27 PROCEDURE — 6370000000 HC RX 637 (ALT 250 FOR IP): Performed by: PAIN MEDICINE

## 2025-06-27 PROCEDURE — 71275 CT ANGIOGRAPHY CHEST: CPT

## 2025-06-27 PROCEDURE — 6370000000 HC RX 637 (ALT 250 FOR IP): Performed by: INTERNAL MEDICINE

## 2025-06-27 PROCEDURE — 64483 NJX AA&/STRD TFRM EPI L/S 1: CPT | Performed by: PAIN MEDICINE

## 2025-06-27 PROCEDURE — 96375 TX/PRO/DX INJ NEW DRUG ADDON: CPT

## 2025-06-27 PROCEDURE — 6360000004 HC RX CONTRAST MEDICATION: Performed by: PAIN MEDICINE

## 2025-06-27 PROCEDURE — 99285 EMERGENCY DEPT VISIT HI MDM: CPT

## 2025-06-27 PROCEDURE — 96374 THER/PROPH/DIAG INJ IV PUSH: CPT

## 2025-06-27 PROCEDURE — 6360000004 HC RX CONTRAST MEDICATION: Performed by: STUDENT IN AN ORGANIZED HEALTH CARE EDUCATION/TRAINING PROGRAM

## 2025-06-27 PROCEDURE — 2500000003 HC RX 250 WO HCPCS: Performed by: PAIN MEDICINE

## 2025-06-27 PROCEDURE — 99221 1ST HOSP IP/OBS SF/LOW 40: CPT | Performed by: PAIN MEDICINE

## 2025-06-27 PROCEDURE — 2500000003 HC RX 250 WO HCPCS: Performed by: STUDENT IN AN ORGANIZED HEALTH CARE EDUCATION/TRAINING PROGRAM

## 2025-06-27 PROCEDURE — 7100000011 HC PHASE II RECOVERY - ADDTL 15 MIN: Performed by: PAIN MEDICINE

## 2025-06-27 PROCEDURE — 80053 COMPREHEN METABOLIC PANEL: CPT

## 2025-06-27 PROCEDURE — 3600000003 HC SURGERY LEVEL 3 BASE: Performed by: PAIN MEDICINE

## 2025-06-27 PROCEDURE — 3600000013 HC SURGERY LEVEL 3 ADDTL 15MIN: Performed by: PAIN MEDICINE

## 2025-06-27 PROCEDURE — 6360000002 HC RX W HCPCS: Performed by: STUDENT IN AN ORGANIZED HEALTH CARE EDUCATION/TRAINING PROGRAM

## 2025-06-27 PROCEDURE — 93005 ELECTROCARDIOGRAM TRACING: CPT | Performed by: STUDENT IN AN ORGANIZED HEALTH CARE EDUCATION/TRAINING PROGRAM

## 2025-06-27 PROCEDURE — 1210000000 HC MED SURG R&B

## 2025-06-27 PROCEDURE — 3E0R33Z INTRODUCTION OF ANTI-INFLAMMATORY INTO SPINAL CANAL, PERCUTANEOUS APPROACH: ICD-10-PCS | Performed by: PAIN MEDICINE

## 2025-06-27 RX ORDER — SODIUM CHLORIDE 0.9 % (FLUSH) 0.9 %
5-40 SYRINGE (ML) INJECTION EVERY 12 HOURS SCHEDULED
Status: DISCONTINUED | OUTPATIENT
Start: 2025-06-27 | End: 2025-07-02 | Stop reason: HOSPADM

## 2025-06-27 RX ORDER — OXYCODONE HYDROCHLORIDE 5 MG/1
5 TABLET ORAL EVERY 4 HOURS PRN
Refills: 0 | Status: DISCONTINUED | OUTPATIENT
Start: 2025-06-27 | End: 2025-07-02 | Stop reason: HOSPADM

## 2025-06-27 RX ORDER — TRIAMCINOLONE ACETONIDE 40 MG/ML
INJECTION, SUSPENSION INTRA-ARTICULAR; INTRAMUSCULAR PRN
Status: DISCONTINUED | OUTPATIENT
Start: 2025-06-27 | End: 2025-06-27 | Stop reason: ALTCHOICE

## 2025-06-27 RX ORDER — POLYETHYLENE GLYCOL 3350 17 G/17G
17 POWDER, FOR SOLUTION ORAL DAILY PRN
Status: DISCONTINUED | OUTPATIENT
Start: 2025-06-27 | End: 2025-07-02 | Stop reason: HOSPADM

## 2025-06-27 RX ORDER — KETOROLAC TROMETHAMINE 15 MG/ML
15 INJECTION, SOLUTION INTRAMUSCULAR; INTRAVENOUS ONCE
Status: COMPLETED | OUTPATIENT
Start: 2025-06-27 | End: 2025-06-27

## 2025-06-27 RX ORDER — HYDROMORPHONE HYDROCHLORIDE 1 MG/ML
1 INJECTION, SOLUTION INTRAMUSCULAR; INTRAVENOUS; SUBCUTANEOUS ONCE
Status: DISCONTINUED | OUTPATIENT
Start: 2025-06-27 | End: 2025-06-27

## 2025-06-27 RX ORDER — ONDANSETRON 2 MG/ML
4 INJECTION INTRAMUSCULAR; INTRAVENOUS EVERY 6 HOURS PRN
Status: DISCONTINUED | OUTPATIENT
Start: 2025-06-27 | End: 2025-07-02 | Stop reason: HOSPADM

## 2025-06-27 RX ORDER — M-VIT,TX,IRON,MINS/CALC/FOLIC 27MG-0.4MG
1 TABLET ORAL DAILY
Status: DISCONTINUED | OUTPATIENT
Start: 2025-06-28 | End: 2025-07-02 | Stop reason: HOSPADM

## 2025-06-27 RX ORDER — SODIUM CHLORIDE 9 MG/ML
INJECTION, SOLUTION INTRAVENOUS PRN
Status: DISCONTINUED | OUTPATIENT
Start: 2025-06-27 | End: 2025-07-02 | Stop reason: HOSPADM

## 2025-06-27 RX ORDER — MECLIZINE HYDROCHLORIDE 25 MG/1
25 TABLET ORAL 3 TIMES DAILY PRN
Status: DISCONTINUED | OUTPATIENT
Start: 2025-06-27 | End: 2025-07-02 | Stop reason: HOSPADM

## 2025-06-27 RX ORDER — HYDROCODONE BITARTRATE AND ACETAMINOPHEN 5; 325 MG/1; MG/1
1 TABLET ORAL 3 TIMES DAILY PRN
Refills: 0 | Status: DISCONTINUED | OUTPATIENT
Start: 2025-06-27 | End: 2025-06-28

## 2025-06-27 RX ORDER — ACETAMINOPHEN 650 MG/1
650 SUPPOSITORY RECTAL EVERY 6 HOURS PRN
Status: DISCONTINUED | OUTPATIENT
Start: 2025-06-27 | End: 2025-07-02 | Stop reason: HOSPADM

## 2025-06-27 RX ORDER — MAGNESIUM SULFATE IN WATER 40 MG/ML
2000 INJECTION, SOLUTION INTRAVENOUS PRN
Status: DISCONTINUED | OUTPATIENT
Start: 2025-06-27 | End: 2025-07-02 | Stop reason: HOSPADM

## 2025-06-27 RX ORDER — LOSARTAN POTASSIUM 100 MG/1
100 TABLET ORAL DAILY
Status: DISCONTINUED | OUTPATIENT
Start: 2025-06-27 | End: 2025-07-02 | Stop reason: HOSPADM

## 2025-06-27 RX ORDER — OXYCODONE AND ACETAMINOPHEN 5; 325 MG/1; MG/1
1 TABLET ORAL ONCE
Refills: 0 | Status: DISCONTINUED | OUTPATIENT
Start: 2025-06-27 | End: 2025-06-27

## 2025-06-27 RX ORDER — POTASSIUM CHLORIDE 1500 MG/1
40 TABLET, EXTENDED RELEASE ORAL PRN
Status: DISCONTINUED | OUTPATIENT
Start: 2025-06-27 | End: 2025-07-02 | Stop reason: HOSPADM

## 2025-06-27 RX ORDER — IOPAMIDOL 755 MG/ML
75 INJECTION, SOLUTION INTRAVASCULAR
Status: COMPLETED | OUTPATIENT
Start: 2025-06-27 | End: 2025-06-27

## 2025-06-27 RX ORDER — LANOLIN ALCOHOL/MO/W.PET/CERES
400 CREAM (GRAM) TOPICAL DAILY
Status: DISCONTINUED | OUTPATIENT
Start: 2025-06-28 | End: 2025-07-02 | Stop reason: HOSPADM

## 2025-06-27 RX ORDER — IBUPROFEN 400 MG/1
200 TABLET, FILM COATED ORAL EVERY 6 HOURS PRN
Status: DISCONTINUED | OUTPATIENT
Start: 2025-06-27 | End: 2025-07-02 | Stop reason: HOSPADM

## 2025-06-27 RX ORDER — ACETAMINOPHEN 325 MG/1
650 TABLET ORAL EVERY 6 HOURS PRN
Status: DISCONTINUED | OUTPATIENT
Start: 2025-06-27 | End: 2025-07-02 | Stop reason: HOSPADM

## 2025-06-27 RX ORDER — IOPAMIDOL 408 MG/ML
INJECTION, SOLUTION INTRATHECAL PRN
Status: DISCONTINUED | OUTPATIENT
Start: 2025-06-27 | End: 2025-06-27 | Stop reason: ALTCHOICE

## 2025-06-27 RX ORDER — GABAPENTIN 300 MG/1
300 CAPSULE ORAL 3 TIMES DAILY
Status: DISCONTINUED | OUTPATIENT
Start: 2025-06-27 | End: 2025-06-28

## 2025-06-27 RX ORDER — SODIUM CHLORIDE 0.9 % (FLUSH) 0.9 %
5-40 SYRINGE (ML) INJECTION PRN
Status: DISCONTINUED | OUTPATIENT
Start: 2025-06-27 | End: 2025-07-02 | Stop reason: HOSPADM

## 2025-06-27 RX ORDER — BUPIVACAINE HYDROCHLORIDE 2.5 MG/ML
INJECTION, SOLUTION EPIDURAL; INFILTRATION; INTRACAUDAL; PERINEURAL PRN
Status: DISCONTINUED | OUTPATIENT
Start: 2025-06-27 | End: 2025-06-27 | Stop reason: ALTCHOICE

## 2025-06-27 RX ORDER — ENOXAPARIN SODIUM 100 MG/ML
40 INJECTION SUBCUTANEOUS DAILY
Status: DISCONTINUED | OUTPATIENT
Start: 2025-06-27 | End: 2025-07-02 | Stop reason: HOSPADM

## 2025-06-27 RX ORDER — POTASSIUM CHLORIDE 7.45 MG/ML
10 INJECTION INTRAVENOUS PRN
Status: DISCONTINUED | OUTPATIENT
Start: 2025-06-27 | End: 2025-07-02 | Stop reason: HOSPADM

## 2025-06-27 RX ORDER — MORPHINE SULFATE 4 MG/ML
8 INJECTION, SOLUTION INTRAMUSCULAR; INTRAVENOUS ONCE
Status: COMPLETED | OUTPATIENT
Start: 2025-06-27 | End: 2025-06-27

## 2025-06-27 RX ORDER — ONDANSETRON 4 MG/1
4 TABLET, ORALLY DISINTEGRATING ORAL EVERY 8 HOURS PRN
Status: DISCONTINUED | OUTPATIENT
Start: 2025-06-27 | End: 2025-07-02 | Stop reason: HOSPADM

## 2025-06-27 RX ORDER — ONDANSETRON 2 MG/ML
4 INJECTION INTRAMUSCULAR; INTRAVENOUS ONCE
Status: COMPLETED | OUTPATIENT
Start: 2025-06-27 | End: 2025-06-27

## 2025-06-27 RX ORDER — OXYCODONE HYDROCHLORIDE 5 MG/1
10 TABLET ORAL EVERY 4 HOURS PRN
Refills: 0 | Status: DISCONTINUED | OUTPATIENT
Start: 2025-06-27 | End: 2025-07-02 | Stop reason: HOSPADM

## 2025-06-27 RX ADMIN — ACETAMINOPHEN 650 MG: 325 TABLET ORAL at 22:35

## 2025-06-27 RX ADMIN — LOSARTAN POTASSIUM 100 MG: 100 TABLET, FILM COATED ORAL at 21:14

## 2025-06-27 RX ADMIN — KETOROLAC TROMETHAMINE 15 MG: 15 INJECTION, SOLUTION INTRAMUSCULAR; INTRAVENOUS at 14:27

## 2025-06-27 RX ADMIN — SODIUM CHLORIDE, PRESERVATIVE FREE 10 ML: 5 INJECTION INTRAVENOUS at 21:15

## 2025-06-27 RX ADMIN — MORPHINE SULFATE 8 MG: 4 INJECTION, SOLUTION INTRAMUSCULAR; INTRAVENOUS at 12:09

## 2025-06-27 RX ADMIN — IOPAMIDOL 75 ML: 755 INJECTION, SOLUTION INTRAVENOUS at 12:44

## 2025-06-27 RX ADMIN — HYDROMORPHONE HYDROCHLORIDE 0.5 MG: 1 INJECTION, SOLUTION INTRAMUSCULAR; INTRAVENOUS; SUBCUTANEOUS at 14:27

## 2025-06-27 RX ADMIN — ONDANSETRON 4 MG: 2 INJECTION, SOLUTION INTRAMUSCULAR; INTRAVENOUS at 12:09

## 2025-06-27 RX ADMIN — DOXYCYCLINE 100 MG: 100 INJECTION, POWDER, LYOPHILIZED, FOR SOLUTION INTRAVENOUS at 21:17

## 2025-06-27 RX ADMIN — WATER 1000 MG: 1 INJECTION INTRAMUSCULAR; INTRAVENOUS; SUBCUTANEOUS at 15:24

## 2025-06-27 ASSESSMENT — PAIN DESCRIPTION - LOCATION
LOCATION: BACK
LOCATION: BACK
LOCATION: HEAD

## 2025-06-27 ASSESSMENT — PAIN - FUNCTIONAL ASSESSMENT: PAIN_FUNCTIONAL_ASSESSMENT: 0-10

## 2025-06-27 ASSESSMENT — PAIN SCALES - GENERAL
PAINLEVEL_OUTOF10: 7
PAINLEVEL_OUTOF10: 6
PAINLEVEL_OUTOF10: 1
PAINLEVEL_OUTOF10: 5
PAINLEVEL_OUTOF10: 3

## 2025-06-27 ASSESSMENT — PAIN DESCRIPTION - ORIENTATION: ORIENTATION: LOWER

## 2025-06-27 ASSESSMENT — LIFESTYLE VARIABLES
HOW OFTEN DO YOU HAVE A DRINK CONTAINING ALCOHOL: NEVER
HOW MANY STANDARD DRINKS CONTAINING ALCOHOL DO YOU HAVE ON A TYPICAL DAY: PATIENT DOES NOT DRINK

## 2025-06-27 ASSESSMENT — PAIN DESCRIPTION - PAIN TYPE: TYPE: ACUTE PAIN

## 2025-06-27 NOTE — PROGRESS NOTES
1640: Pt transferred to PACU at this time.     Electronically signed by RACHEL CARNEY RN on 6/27/25 at 4:42 PM EDT

## 2025-06-27 NOTE — ED PROVIDER NOTES
[facility] EMERGENCY DEPARTMENT  Emergency Department Encounter  Emergency Medicine      Pt Name: Mari Goodrich  MRN:[id number]  Birthdate 1955  Date of evaluation: 6/27/25  Time: 11:31 AM EDT  PCP:  Elvis Moore MD    CHIEF COMPLAINT       Chief Complaint   Patient presents with    Back Pain       HISTORY OF PRESENT ILLNESS  (Location/Symptom, Timing/Onset, Context/Setting, Quality, Duration, ModifyingFactors, Severity.)      Mari Goodrich is a 70 y.o. female presents with right sided back pain.  Patient has had back pain for about 6 months and recently had a CAT scan showing a mass in her sacrum.  She recently started following with pain management and started on Norco 3 times per day starting yesterday.  She did take these and even needed to increase the dose over the last day because it is so painful.  She said it is consistently now a 10/10 pain, difficulty sitting down.  Pain is in the right lower back and goes all the way down her leg.  She denies any falls denies any loss of bowel or bladder control no numbness in the legs.  Pain is described as severe and intractable.  She was told to come to the ER by her neurosurgeon Dr Rausch if the pain was uncontrolled.  She said she is scheduled to have an MRI but has not been able to get in until middle of next month.      On ROS she admits to some shortness of breath intermittently over the last few days.  She has had a dry cough.  Denies any hemoptysis.  No leg swelling, denies chest pain denies any fevers no abdominal pain.  Admits to some nausea no vomiting    She has a remote history of breast cancer in the 90s, remote history of asthma, controlled, no history of COPD, no smoking history  PAST MEDICAL / SURGICAL / SOCIAL /FAMILY HISTORY      has a past medical history of Asthma, Breast cancer (HCC), Diabetes mellitus (HCC), History of therapeutic radiation, Hx antineoplastic chemo, Hypertension, and Obesity.  No other pertinent PMH on review with  Eval    CBC with Auto Differential    CMP    POCT CREATININE    EKG 12 Lead       MEDICATIONS ORDERED:  Orders Placed This Encounter   Medications    morphine (PF) injection 8 mg    ondansetron (ZOFRAN) injection 4 mg    iopamidol (ISOVUE-370) 76 % injection 75 mL    DISCONTD: HYDROmorphone HCl PF (DILAUDID) injection 1 mg    ketorolac (TORADOL) injection 15 mg    DISCONTD: oxyCODONE-acetaminophen (PERCOCET) 5-325 MG per tablet 1 tablet     Refill:  0    HYDROmorphone (DILAUDID) injection 0.5 mg    cefTRIAXone (ROCEPHIN) 1,000 mg in sterile water 10 mL IV syringe     Antimicrobial Indications:   Pneumonia (CAP)    doxycycline (VIBRAMYCIN) 100 mg in sodium chloride 0.9 % 100 mL IVPB     Antimicrobial Indications:   Pneumonia (CAP)           DIAGNOSTIC RESULTS / EMERGENCY DEPARTMENT COURSE / MDM     LABS:  Results for orders placed or performed during the hospital encounter of 06/27/25   CBC with Auto Differential   Result Value Ref Range    WBC 8.5 4.8 - 10.8 K/uL    RBC 4.21 4.20 - 5.40 M/uL    Hemoglobin 13.3 12.0 - 16.0 g/dL    Hematocrit 39.5 37.0 - 47.0 %    MCV 93.8 79.4 - 94.8 fL    MCH 31.6 (H) 27.0 - 31.3 pg    MCHC 33.7 33.0 - 37.0 %    RDW 13.6 11.5 - 14.5 %    Platelets 323 130 - 400 K/uL    Neutrophils % 75.6 %    Lymphocytes % 15.7 %    Monocytes % 7.4 %    Eosinophils % 0.7 %    Basophils % 0.4 %    Neutrophils Absolute 6.5 1.4 - 6.5 K/uL    Lymphocytes Absolute 1.3 1.0 - 4.8 K/uL    Monocytes Absolute 0.6 0.2 - 0.8 K/uL    Eosinophils Absolute 0.1 0.0 - 0.7 K/uL    Basophils Absolute 0.0 0.0 - 0.2 K/uL   CMP   Result Value Ref Range    Sodium 137 135 - 144 mEq/L    Potassium 4.0 3.4 - 4.9 mEq/L    Chloride 101 95 - 107 mEq/L    CO2 19 (L) 20 - 31 mEq/L    Anion Gap 17 (H) 9 - 15 mEq/L    Glucose 107 (H) 70 - 99 mg/dL    BUN 23 8 - 23 mg/dL    Creatinine 0.71 0.50 - 0.90 mg/dL    Est, Glom Filt Rate >90.0 >60    Calcium 10.0 (H) 8.5 - 9.9 mg/dL    Total Protein 7.9 6.3 - 8.0 g/dL    Albumin 4.8 (H) 3.5  antibiotics for potential pneumonia [SF]      ED Course User Index  [SF] Royer Lawrence DO       RADIOLOGY:  CTA CHEST W WO CONTRAST PE Eval   Final Result   1. No evidence of pulmonary embolism.  No evidence for thoracic aortic   dissection or aneurysm.   2. Left lower lobe consolidation with air bronchograms and volume loss.   Infection or inflammation have a similar appearance.  Repeat CT in 6 weeks is   recommended to document resolution of the finding.   3. Tree-in-bud like opacities within the left upper lobe compatible with   infection or inflammation.   4. No evidence for aortic aneurysm or dissection.               EKG  See MDM for my interpretation     All EKG's are interpreted by the Emergency Department Physician who either signs or Co-signs this chart in the absence of a cardiologist.      PROCEDURES:  None    CONSULTS:  None    Critical Care Time:  none    FINAL IMPRESSION      1. Intractable low back pain    2. Sacral mass    3. Pneumonia of left lower lobe due to infectious organism          DISPOSITION / PLAN     DISPOSITION Decision To Admit 06/27/2025 02:27:43 PM   DISPOSITION CONDITION Stable           PATIENT REFERREDTO:  No follow-up provider specified.    DISCHARGE MEDICATIONS:  New Prescriptions    No medications on file       Royer Lawrence DO  Emergency Medicine Physician  06/27/25 2:52 PM        (Please note that portions of this note were completed with a voice recognition program.Efforts were made to edit the dictations but occasionally words are mis-transcribed.)        Royer Lawrence DO  06/27/25 [id number]

## 2025-06-27 NOTE — CONSULTS
Department of Chronic Pain Managment  Attending Progress Note      SUBJECTIVE  Low back pain    OBJECTIVE: Patient here today for initial valuation.  She has pain in her right side of her low back down her leg.  It has been going on for 6 months.  No back surgery she is not diabetic she is not on blood thinners currently does not work at physical jobs on her feet in the past.  Standing is best.  Although sitting standing bending laying down also bothers does not smoke.  She does have a CT scan ordered by Dr. Rausch  she has significant stenosis L3-4 along with lytic lesion in the right aspect of the sacrum.  He apparently has ordered a stat MRI.  Tylenol Motrin do not help for pain.  Gabapentin makes her sleepy. Was started on 3 norcos a day but she got admitted through ED for severe pain Had IV morphine with no relief.    Medications  Current Facility-Administered Medications: doxycycline (VIBRAMYCIN) 100 mg in sodium chloride 0.9 % 100 mL IVPB, 100 mg, IntraVENous, Q12H  sodium chloride flush 0.9 % injection 5-40 mL, 5-40 mL, IntraVENous, 2 times per day  sodium chloride flush 0.9 % injection 5-40 mL, 5-40 mL, IntraVENous, PRN  0.9 % sodium chloride infusion, , IntraVENous, PRN  potassium chloride (KLOR-CON M) extended release tablet 40 mEq, 40 mEq, Oral, PRN **OR** potassium bicarb-citric acid (EFFER-K) effervescent tablet 40 mEq, 40 mEq, Oral, PRN **OR** potassium chloride 10 mEq/100 mL IVPB (Peripheral Line), 10 mEq, IntraVENous, PRN  magnesium sulfate 2000 mg in 50 mL IVPB premix, 2,000 mg, IntraVENous, PRN  enoxaparin (LOVENOX) injection 40 mg, 40 mg, SubCUTAneous, Daily  ondansetron (ZOFRAN-ODT) disintegrating tablet 4 mg, 4 mg, Oral, Q8H PRN **OR** ondansetron (ZOFRAN) injection 4 mg, 4 mg, IntraVENous, Q6H PRN  polyethylene glycol (GLYCOLAX) packet 17 g, 17 g, Oral, Daily PRN  acetaminophen (TYLENOL) tablet 650 mg, 650 mg, Oral, Q6H PRN **OR** acetaminophen (TYLENOL) suppository 650 mg, 650 mg, Rectal, Q6H

## 2025-06-27 NOTE — BRIEF OP NOTE
Brief Postoperative Note      Patient: Mari Goodrich  YOB: 1955  MRN: [id number]    Date of Procedure: 6/27/2025    Pre-Op Diagnosis Codes:      * Intractable low back pain [M54.59]      Lumbar radiculopathy    Post-Op Diagnosis: Lumbar radiculopathy.       Procedure(s):  Right sided Trans foraminal Epidural Steroid injection. ROOM 481    Surgeon(s):  Sawyer Lyons MD    Assistant:  * No surgical staff found *    Anesthesia: Local    Estimated Blood Loss (mL): Minimal    Complications: None    Specimens:   * No specimens in log *    Implants:  * No implants in log *      Drains: * No LDAs found *    Findings:  Infection Present At Time Of Surgery (PATOS) (choose all levels that have infection present):  No infection present  Other Findings: None    Right L3-4 Trans foraminal Epidural Steroid injection:.   Discussed procedure with Pt, adressed risks and concerns, informed consent obtained.  Prone pojistion on Vianca table, right oblique used to renetta skin for needle placement, Skin prepped and draped with betadine, . 23G needle advanced to Right L3-4 foramen final pojistion confirmed with oblique and AP View injection of 0.5cc omnipaque injection showed good epidural spread. injected 40mg Kenalog and 2cc 0.25% Bupivacaine at this spot with good relief, skin washed with Wet towel, Pt discharged to floor in stable condition.   Electronically signed by Sawyer Lyons MD on 6/27/2025 at 5:36 PM

## 2025-06-27 NOTE — ED TRIAGE NOTES
Pt presents to ER via family through lobby.  Chief complaint: Comes to ER today for pain, told to come here if pain increased.  Pt states was here on Monday for outpatient CT. Metastatic lesions and stenosis, and nodules on lung found.

## 2025-06-28 ENCOUNTER — APPOINTMENT (OUTPATIENT)
Dept: MRI IMAGING | Age: 70
DRG: 551 | End: 2025-06-28
Payer: COMMERCIAL

## 2025-06-28 LAB
ANION GAP SERPL CALCULATED.3IONS-SCNC: 11 MEQ/L (ref 9–15)
BASOPHILS # BLD: 0 K/UL (ref 0–0.2)
BASOPHILS NFR BLD: 0.3 %
BUN SERPL-MCNC: 18 MG/DL (ref 8–23)
CALCIUM SERPL-MCNC: 9.5 MG/DL (ref 8.5–9.9)
CHLORIDE SERPL-SCNC: 104 MEQ/L (ref 95–107)
CO2 SERPL-SCNC: 23 MEQ/L (ref 20–31)
CREAT SERPL-MCNC: 0.61 MG/DL (ref 0.5–0.9)
EOSINOPHIL # BLD: 0 K/UL (ref 0–0.7)
EOSINOPHIL NFR BLD: 0.3 %
ERYTHROCYTE [DISTWIDTH] IN BLOOD BY AUTOMATED COUNT: 13.4 % (ref 11.5–14.5)
GLUCOSE BLD-MCNC: 101 MG/DL (ref 70–99)
GLUCOSE BLD-MCNC: 111 MG/DL (ref 70–99)
GLUCOSE BLD-MCNC: 95 MG/DL (ref 70–99)
GLUCOSE SERPL-MCNC: 121 MG/DL (ref 70–99)
HCT VFR BLD AUTO: 35.4 % (ref 37–47)
HGB BLD-MCNC: 12.1 G/DL (ref 12–16)
LYMPHOCYTES # BLD: 0.7 K/UL (ref 1–4.8)
LYMPHOCYTES NFR BLD: 10.9 %
MCH RBC QN AUTO: 31 PG (ref 27–31.3)
MCHC RBC AUTO-ENTMCNC: 34.2 % (ref 33–37)
MCV RBC AUTO: 90.8 FL (ref 79.4–94.8)
MONOCYTES # BLD: 0.4 K/UL (ref 0.2–0.8)
MONOCYTES NFR BLD: 5.2 %
NEUTROPHILS # BLD: 5.6 K/UL (ref 1.4–6.5)
NEUTS SEG NFR BLD: 83.2 %
PERFORMED ON: ABNORMAL
PERFORMED ON: ABNORMAL
PERFORMED ON: NORMAL
PERFORMED ON: NORMAL
PLATELET # BLD AUTO: 259 K/UL (ref 130–400)
POC CREATININE: 0.8 MG/DL (ref 0.6–1.2)
POC SAMPLE TYPE: NORMAL
POTASSIUM SERPL-SCNC: 4.3 MEQ/L (ref 3.4–4.9)
RBC # BLD AUTO: 3.9 M/UL (ref 4.2–5.4)
SODIUM SERPL-SCNC: 138 MEQ/L (ref 135–144)
WBC # BLD AUTO: 6.7 K/UL (ref 4.8–10.8)

## 2025-06-28 PROCEDURE — 6370000000 HC RX 637 (ALT 250 FOR IP): Performed by: INTERNAL MEDICINE

## 2025-06-28 PROCEDURE — 2580000003 HC RX 258: Performed by: PAIN MEDICINE

## 2025-06-28 PROCEDURE — 2500000003 HC RX 250 WO HCPCS: Performed by: NURSE PRACTITIONER

## 2025-06-28 PROCEDURE — 36415 COLL VENOUS BLD VENIPUNCTURE: CPT

## 2025-06-28 PROCEDURE — 72158 MRI LUMBAR SPINE W/O & W/DYE: CPT

## 2025-06-28 PROCEDURE — 2500000003 HC RX 250 WO HCPCS: Performed by: PAIN MEDICINE

## 2025-06-28 PROCEDURE — A9579 GAD-BASE MR CONTRAST NOS,1ML: HCPCS | Performed by: PAIN MEDICINE

## 2025-06-28 PROCEDURE — 6360000004 HC RX CONTRAST MEDICATION: Performed by: PAIN MEDICINE

## 2025-06-28 PROCEDURE — 85025 COMPLETE CBC W/AUTO DIFF WBC: CPT

## 2025-06-28 PROCEDURE — 1210000000 HC MED SURG R&B

## 2025-06-28 PROCEDURE — 6360000002 HC RX W HCPCS: Performed by: PAIN MEDICINE

## 2025-06-28 PROCEDURE — 6360000002 HC RX W HCPCS: Performed by: STUDENT IN AN ORGANIZED HEALTH CARE EDUCATION/TRAINING PROGRAM

## 2025-06-28 PROCEDURE — 80048 BASIC METABOLIC PNL TOTAL CA: CPT

## 2025-06-28 PROCEDURE — 6370000000 HC RX 637 (ALT 250 FOR IP): Performed by: PAIN MEDICINE

## 2025-06-28 RX ORDER — GADOTERIDOL 279.3 MG/ML
20 INJECTION INTRAVENOUS
Status: COMPLETED | OUTPATIENT
Start: 2025-06-28 | End: 2025-06-28

## 2025-06-28 RX ORDER — PREGABALIN 50 MG/1
100 CAPSULE ORAL 3 TIMES DAILY
Status: DISCONTINUED | OUTPATIENT
Start: 2025-06-28 | End: 2025-07-02 | Stop reason: HOSPADM

## 2025-06-28 RX ORDER — GUAIFENESIN 200 MG/10ML
200 LIQUID ORAL
Status: DISCONTINUED | OUTPATIENT
Start: 2025-06-28 | End: 2025-07-02 | Stop reason: HOSPADM

## 2025-06-28 RX ADMIN — OXYCODONE 10 MG: 5 TABLET ORAL at 18:58

## 2025-06-28 RX ADMIN — HYDROMORPHONE HYDROCHLORIDE 0.5 MG: 1 INJECTION, SOLUTION INTRAMUSCULAR; INTRAVENOUS; SUBCUTANEOUS at 13:33

## 2025-06-28 RX ADMIN — PREGABALIN 100 MG: 50 CAPSULE ORAL at 15:33

## 2025-06-28 RX ADMIN — OXYCODONE 10 MG: 5 TABLET ORAL at 06:54

## 2025-06-28 RX ADMIN — DOXYCYCLINE 100 MG: 100 INJECTION, POWDER, LYOPHILIZED, FOR SOLUTION INTRAVENOUS at 22:17

## 2025-06-28 RX ADMIN — LOSARTAN POTASSIUM 100 MG: 100 TABLET, FILM COATED ORAL at 22:13

## 2025-06-28 RX ADMIN — GUAIFENESIN 200 MG: 100 LIQUID ORAL at 06:54

## 2025-06-28 RX ADMIN — OXYCODONE 10 MG: 5 TABLET ORAL at 22:10

## 2025-06-28 RX ADMIN — PREGABALIN 100 MG: 50 CAPSULE ORAL at 22:11

## 2025-06-28 RX ADMIN — ENOXAPARIN SODIUM 40 MG: 100 INJECTION SUBCUTANEOUS at 09:45

## 2025-06-28 RX ADMIN — GUAIFENESIN 200 MG: 100 LIQUID ORAL at 04:04

## 2025-06-28 RX ADMIN — GUAIFENESIN 200 MG: 100 LIQUID ORAL at 19:00

## 2025-06-28 RX ADMIN — SODIUM CHLORIDE, PRESERVATIVE FREE 10 ML: 5 INJECTION INTRAVENOUS at 22:11

## 2025-06-28 RX ADMIN — DOXYCYCLINE 100 MG: 100 INJECTION, POWDER, LYOPHILIZED, FOR SOLUTION INTRAVENOUS at 09:45

## 2025-06-28 RX ADMIN — PREGABALIN 100 MG: 50 CAPSULE ORAL at 10:30

## 2025-06-28 RX ADMIN — GUAIFENESIN 200 MG: 100 LIQUID ORAL at 15:33

## 2025-06-28 RX ADMIN — GADOTERIDOL 20 ML: 279.3 INJECTION, SOLUTION INTRAVENOUS at 14:23

## 2025-06-28 RX ADMIN — Medication 1 TABLET: at 09:34

## 2025-06-28 RX ADMIN — Medication 400 MG: at 22:11

## 2025-06-28 RX ADMIN — GUAIFENESIN 200 MG: 100 LIQUID ORAL at 22:11

## 2025-06-28 ASSESSMENT — PAIN SCALES - GENERAL
PAINLEVEL_OUTOF10: 2
PAINLEVEL_OUTOF10: 7
PAINLEVEL_OUTOF10: 7
PAINLEVEL_OUTOF10: 2
PAINLEVEL_OUTOF10: 4
PAINLEVEL_OUTOF10: 2
PAINLEVEL_OUTOF10: 6

## 2025-06-28 ASSESSMENT — PAIN DESCRIPTION - LOCATION
LOCATION: BACK;BUTTOCKS
LOCATION: BACK
LOCATION: BACK;BUTTOCKS

## 2025-06-28 ASSESSMENT — PAIN DESCRIPTION - ORIENTATION: ORIENTATION: RIGHT

## 2025-06-28 NOTE — PROGRESS NOTES
Patient MRI results sent to Dr Lyons. Message returned and requested that Dr. Juárez be made aware of results.  [id number]:Perfect serve sent to Dr Juárez as requested. Responded he agrees with Oncology consult May need transfer to tertiary care center. He does not perform surgery for sacral lesions. Spoke with Dr Borges and oncology consult ordered.

## 2025-06-29 ENCOUNTER — APPOINTMENT (OUTPATIENT)
Dept: CT IMAGING | Age: 70
DRG: 551 | End: 2025-06-29
Payer: COMMERCIAL

## 2025-06-29 PROCEDURE — 2500000003 HC RX 250 WO HCPCS: Performed by: PAIN MEDICINE

## 2025-06-29 PROCEDURE — 82378 CARCINOEMBRYONIC ANTIGEN: CPT

## 2025-06-29 PROCEDURE — 2580000003 HC RX 258: Performed by: PAIN MEDICINE

## 2025-06-29 PROCEDURE — 1210000000 HC MED SURG R&B

## 2025-06-29 PROCEDURE — 86304 IMMUNOASSAY TUMOR CA 125: CPT

## 2025-06-29 PROCEDURE — 6370000000 HC RX 637 (ALT 250 FOR IP): Performed by: PAIN MEDICINE

## 2025-06-29 PROCEDURE — 74177 CT ABD & PELVIS W/CONTRAST: CPT

## 2025-06-29 PROCEDURE — 83520 IMMUNOASSAY QUANT NOS NONAB: CPT

## 2025-06-29 PROCEDURE — 6360000002 HC RX W HCPCS: Performed by: PAIN MEDICINE

## 2025-06-29 PROCEDURE — 36415 COLL VENOUS BLD VENIPUNCTURE: CPT

## 2025-06-29 PROCEDURE — 2500000003 HC RX 250 WO HCPCS: Performed by: NURSE PRACTITIONER

## 2025-06-29 PROCEDURE — 2500000003 HC RX 250 WO HCPCS: Performed by: STUDENT IN AN ORGANIZED HEALTH CARE EDUCATION/TRAINING PROGRAM

## 2025-06-29 PROCEDURE — 82784 ASSAY IGA/IGD/IGG/IGM EACH: CPT

## 2025-06-29 PROCEDURE — 6360000004 HC RX CONTRAST MEDICATION: Performed by: INTERNAL MEDICINE

## 2025-06-29 PROCEDURE — 6360000002 HC RX W HCPCS: Performed by: STUDENT IN AN ORGANIZED HEALTH CARE EDUCATION/TRAINING PROGRAM

## 2025-06-29 PROCEDURE — 6370000000 HC RX 637 (ALT 250 FOR IP): Performed by: INTERNAL MEDICINE

## 2025-06-29 PROCEDURE — 99231 SBSQ HOSP IP/OBS SF/LOW 25: CPT | Performed by: PAIN MEDICINE

## 2025-06-29 RX ORDER — IOPAMIDOL 755 MG/ML
75 INJECTION, SOLUTION INTRAVASCULAR
Status: COMPLETED | OUTPATIENT
Start: 2025-06-29 | End: 2025-06-29

## 2025-06-29 RX ADMIN — DOXYCYCLINE 100 MG: 100 INJECTION, POWDER, LYOPHILIZED, FOR SOLUTION INTRAVENOUS at 08:44

## 2025-06-29 RX ADMIN — GUAIFENESIN 200 MG: 100 LIQUID ORAL at 11:29

## 2025-06-29 RX ADMIN — SODIUM CHLORIDE, PRESERVATIVE FREE 10 ML: 5 INJECTION INTRAVENOUS at 21:26

## 2025-06-29 RX ADMIN — IOPAMIDOL 75 ML: 755 INJECTION, SOLUTION INTRAVENOUS at 16:20

## 2025-06-29 RX ADMIN — LOSARTAN POTASSIUM 100 MG: 100 TABLET, FILM COATED ORAL at 21:25

## 2025-06-29 RX ADMIN — GUAIFENESIN 200 MG: 100 LIQUID ORAL at 15:46

## 2025-06-29 RX ADMIN — PREGABALIN 100 MG: 50 CAPSULE ORAL at 21:25

## 2025-06-29 RX ADMIN — Medication 400 MG: at 21:25

## 2025-06-29 RX ADMIN — WATER 1000 MG: 1 INJECTION INTRAMUSCULAR; INTRAVENOUS; SUBCUTANEOUS at 14:47

## 2025-06-29 RX ADMIN — ENOXAPARIN SODIUM 40 MG: 100 INJECTION SUBCUTANEOUS at 08:44

## 2025-06-29 RX ADMIN — PREGABALIN 100 MG: 50 CAPSULE ORAL at 14:44

## 2025-06-29 RX ADMIN — GUAIFENESIN 200 MG: 100 LIQUID ORAL at 21:25

## 2025-06-29 RX ADMIN — OXYCODONE 10 MG: 5 TABLET ORAL at 06:26

## 2025-06-29 RX ADMIN — DOXYCYCLINE 100 MG: 100 INJECTION, POWDER, LYOPHILIZED, FOR SOLUTION INTRAVENOUS at 21:26

## 2025-06-29 RX ADMIN — OXYCODONE 10 MG: 5 TABLET ORAL at 15:45

## 2025-06-29 RX ADMIN — Medication 1 TABLET: at 08:44

## 2025-06-29 RX ADMIN — OXYCODONE 10 MG: 5 TABLET ORAL at 11:27

## 2025-06-29 RX ADMIN — GUAIFENESIN 200 MG: 100 LIQUID ORAL at 05:27

## 2025-06-29 RX ADMIN — PREGABALIN 100 MG: 50 CAPSULE ORAL at 08:44

## 2025-06-29 ASSESSMENT — PAIN DESCRIPTION - LOCATION
LOCATION: BACK
LOCATION: BACK
LOCATION: BACK;LEG

## 2025-06-29 ASSESSMENT — PAIN SCALES - GENERAL
PAINLEVEL_OUTOF10: 3
PAINLEVEL_OUTOF10: 3
PAINLEVEL_OUTOF10: 2
PAINLEVEL_OUTOF10: 6
PAINLEVEL_OUTOF10: 7
PAINLEVEL_OUTOF10: 7
PAINLEVEL_OUTOF10: 2

## 2025-06-29 NOTE — H&P
DEPARTMENT OF HOSPITAL MEDICINE    HISTORY AND PHYSICAL    PATIENT NAME:  Mari Goodrich    MRN:  [id number]  SERVICE DATE:  6/27/2025   SERVICE TIME:  3:47 PM    Primary Care Physician: Elvis Moore MD     SUBJECTIVE  CHIEF COMPLAINT:    Chief Complaint   Patient presents with    Back Pain     HPI:  This is a 70 y.o. female with PMH of HTN and breast cancer who presents with intractable low back pain. Pt states this pain has been present for the last 6 months but has been progressively getting worse. She saw pain management yesterday and was prescribed Norco but states the pain has been severe and she could not take it so she presented to the ED for further evaluation. Of note, a recent CT L-spine showed a lytic lesion in the sacrum. Pt does reports a history of breast cancer in which she underwent a lumpectomy with lymph node dissection with no evidence of cancer on any lymph nodes. She is s/p chemotherapy and radiation and states this all occurred 30 years ago. She reports an unremarkable mammogram in January of this year. She reports an intentional 50 pound weight loss over the last 3 years. She denies fevers, chills chest pain, or abdominal pain. She does report a dry cough and some intermittent shortness of breath with exertion. She also reports some intermittent nausea but no emesis.    PAST MEDICAL HISTORY:    Past Medical History:   Diagnosis Date    Asthma 1994    Breast cancer (HCC)     Diabetes mellitus (HCC) 2020    History of therapeutic radiation     Hx antineoplastic chemo     Hypertension     Obesity      PAST SURGICAL HISTORY:    Past Surgical History:   Procedure Laterality Date    BREAST BIOPSY      BREAST LUMPECTOMY      BREAST REDUCTION SURGERY      HYSTERECTOMY (CERVIX STATUS UNKNOWN)      HYSTERECTOMY, TOTAL ABDOMINAL (CERVIX REMOVED)       FAMILY HISTORY:    Family History   Problem Relation Age of Onset    Arthritis Mother     Stomach Cancer Mother     Alcohol Abuse Father     Heart Disease

## 2025-06-29 NOTE — PROGRESS NOTES
Department of Chronic Pain Managment  Attending Progress Note      SUBJECTIVE  Low back pain    OBJECTIVE: Patient here today for initial valuation.  She has pain in her right side of her low back down her leg.  It has been going on for 6 months.  No back surgery she is not diabetic she is not on blood thinners currently does not work at physical jobs on her feet in the past.  Standing is best.  Although sitting standing bending laying down also bothers does not smoke.  She does have a CT scan ordered by Dr. Rausch  she has significant stenosis L3-4 along with lytic lesion in the right aspect of the sacrum. Tylenol Motrin do not help for pain.  Gabapentin makes her sleepy. Was started on 3 norcos a day but she got admitted through ED for severe pain Had IV morphine with no relief.Now s/p TFESI and Lyrica that seemed to help.    Medications  Current Facility-Administered Medications: guaiFENesin (ROBITUSSIN) 100 MG/5ML liquid 200 mg, 200 mg, Oral, Q4H While awake  pregabalin (LYRICA) capsule 100 mg, 100 mg, Oral, TID  doxycycline (VIBRAMYCIN) 100 mg in sodium chloride 0.9 % 100 mL IVPB, 100 mg, IntraVENous, Q12H  sodium chloride flush 0.9 % injection 5-40 mL, 5-40 mL, IntraVENous, 2 times per day  sodium chloride flush 0.9 % injection 5-40 mL, 5-40 mL, IntraVENous, PRN  0.9 % sodium chloride infusion, , IntraVENous, PRN  potassium chloride (KLOR-CON M) extended release tablet 40 mEq, 40 mEq, Oral, PRN **OR** potassium bicarb-citric acid (EFFER-K) effervescent tablet 40 mEq, 40 mEq, Oral, PRN **OR** potassium chloride 10 mEq/100 mL IVPB (Peripheral Line), 10 mEq, IntraVENous, PRN  magnesium sulfate 2000 mg in 50 mL IVPB premix, 2,000 mg, IntraVENous, PRN  enoxaparin (LOVENOX) injection 40 mg, 40 mg, SubCUTAneous, Daily  ondansetron (ZOFRAN-ODT) disintegrating tablet 4 mg, 4 mg, Oral, Q8H PRN **OR** ondansetron (ZOFRAN) injection 4 mg, 4 mg, IntraVENous, Q6H PRN  polyethylene glycol (GLYCOLAX) packet 17 g, 17 g, Oral,  supraclavicular lymphadenopathy  BACK:  Symmetric, no curvature, spinous processes are non-tender on palpation, paraspinous muscles are non-tender on palpation, no costal vertebral tenderness  LUNGS:  No increased work of breathing, good air exchange, clear to auscultation bilaterally, no crackles or wheezing  CARDIOVASCULAR:  Normal apical impulse, regular rate and rhythm, normal S1 and S2, no S3 or S4, and no murmur noted  ABDOMEN:  No scars, normal bowel sounds, soft, non-distended, non-tender, no masses palpated, no hepatosplenomegally  MUSCULOSKELETAL:  There is no redness, warmth, or swelling of the joints.  Full range of motion noted.  Motor strength is 5 out of 5 all extremities bilaterally.  Tone is normal.  NEUROLOGIC:  Awake, alert, oriented to name, place and time.  Cranial nerves II-XII are grossly intact.  Motor is 5 out of 5 bilaterally.  Cerebellar finger to nose, heel to shin intact.  Sensory is intact.  Babinski down going, Romberg negative, and gait is normal.Severe pain RT SLR no pain on Patricks.  SKIN:  no bruising or bleeding, normal skin color, texture, turgor, and no redness, warmth, or swelling  Data  CBC with Differential:    Lab Results   Component Value Date/Time    WBC 6.7 06/28/2025 05:41 AM    RBC 3.90 06/28/2025 05:41 AM    HGB 12.1 06/28/2025 05:41 AM    HCT 35.4 06/28/2025 05:41 AM     06/28/2025 05:41 AM    MCV 90.8 06/28/2025 05:41 AM    MCH 31.0 06/28/2025 05:41 AM    MCHC 34.2 06/28/2025 05:41 AM    RDW 13.4 06/28/2025 05:41 AM    LYMPHOPCT 10.9 06/28/2025 05:41 AM    MONOPCT 5.2 06/28/2025 05:41 AM    EOSPCT 0.3 06/28/2025 05:41 AM    BASOPCT 0.3 06/28/2025 05:41 AM    MONOSABS 0.4 06/28/2025 05:41 AM    LYMPHSABS 0.7 06/28/2025 05:41 AM    EOSABS 0.0 06/28/2025 05:41 AM    BASOSABS 0.0 06/28/2025 05:41 AM     BMP:    Lab Results   Component Value Date/Time     06/28/2025 05:41 AM    K 4.3 06/28/2025 05:41 AM     06/28/2025 05:41 AM    CO2 23 06/28/2025

## 2025-06-29 NOTE — CARE COORDINATION
Case Management Assessment  Initial Evaluation    Date/Time of Evaluation: 6/29/2025 9:11 AM  Assessment Completed by: Allyssa Greer RN    If patient is discharged prior to next notation, then this note serves as note for discharge by case management.    Patient Name: Mari Goodrich                   YOB: 1955  Diagnosis: Intractable low back pain [M54.59]  Sacral mass [M53.3]  Pneumonia of left lower lobe due to infectious organism [J18.9]                   Date / Time: 6/27/2025 11:29 AM    Patient Admission Status: Inpatient   Readmission Risk (Low < 19, Mod (19-27), High > 27): Readmission Risk Score: 4.4    Current PCP: Elvis Moore MD  PCP verified by CM? Yes    Chart Reviewed: Yes      History Provided by: Patient  Patient Orientation: Alert and Oriented, Person, Place, Situation, Self    Patient Cognition: Alert    Hospitalization in the last 30 days (Readmission):  No    If yes, Readmission Assessment in  Navigator will be completed.    Advance Directives:      Code Status: Full Code   Patient's Primary Decision Maker is: Legal Next of Kin    Primary Decision Maker: SCOTTY GOODRICH - Spouse - [phone]    Secondary Decision Maker: Scotty Goodrich IZAIAH - Child - [phone]    Discharge Planning:    Patient lives with: Spouse/Significant Other, Family Members (, SON AND DAUGHTER IN LAW) Type of Home: House  Primary Care Giver: Self  Patient Support Systems include: Spouse/Significant Other, Children, Family Members, Friends/Neighbors   Current Financial resources:    Current community resources:    Current services prior to admission: None            Current DME:              Type of Home Care services:  None    ADLS  Prior functional level: Independent in ADLs/IADLs  Current functional level: Independent in ADLs/IADLs    PT AM-PAC:   /24  OT AM-PAC:   /24    Family can provide assistance at DC: Yes  Would you like Case Management to discuss the discharge plan with any other family

## 2025-06-29 NOTE — CONSULTS
Hematology/Oncology Consult  Encounter Date: 2025 1:52 PM    Ms. Mari Goodrich is a 70 y.o. female  : 1955  MRN: [id number]  Acct Number: [id number]  Requesting Provider: dr Caba    Reason for request: sacral mass      CONSULTANT: Carlos Loera MD    HPI: Mari was admitted for low back pain. mRI of lumbar spine showed sacral mass measuring 6.7 x 4.4 x 7.1  cm with extension to sacral canal, involving S1-3 neural foramina and extraosseous extension to adjacent soft tissue. She has been having pain since 2024. She has been seen and managed by chiropractor. Lost weight intentionally. Mammogram from 2025 was CAT 2. Dr Lyons managing pain.     Patient Active Problem List   Diagnosis    Degenerative disc disease, cervical    Carpal tunnel syndrome of right wrist    Essential hypertension    Mixed hyperlipidemia    History of left breast cancer    H/O total hysterectomy    FH: diabetes mellitus    Prediabetes    Intractable low back pain    Lumbar radiculitis     Past Medical History:   Diagnosis Date    Asthma     Breast cancer (HCC)     Diabetes mellitus (HCC)     History of therapeutic radiation     Hx antineoplastic chemo     Hypertension     Obesity      @PSH@  Family History   Problem Relation Age of Onset    Arthritis Mother     Stomach Cancer Mother     Alcohol Abuse Father     Heart Disease Father     Diabetes type 2  Father     Heart Attack Father     Diabetes Father     Diabetes type 2  Brother     Lung Cancer Brother     Leukemia Maternal Grandmother     Asthma Paternal Grandmother     Diabetes Brother      Social History     Socioeconomic History    Marital status:      Spouse name: Not on file    Number of children: Not on file    Years of education: Not on file    Highest education level: Not on file   Occupational History    Not on file   Tobacco Use    Smoking status: Never    Smokeless tobacco: Never   Substance and Sexual Activity    Alcohol use: No    Drug use: No     evidence of fracture or malalignment.  There is levoscoliosis of the lumbar spine with disc space narrowing, subchondral sclerosis, and osteophytosis along the concavity of the curvature notable along the right aspect of endplates of L2/L3 and L3/L4.  Moderate central canal stenosis is present at L2/L3.  Severe central canal stenosis is present at L3/L4.  Mild central canal stenosis is present at L4/L5.  There is neural foraminal narrowing at multiple levels notable on the left at L3/L4.  There is a lytic lesion with obscured margins located in the right aspect of the sacrum measuring approximately 4.4 x 4 1 cm. Nodular opacities are seen in the visualized left lower lung field.     1. Lytic lesion with obscured margins is seen in the right aspect of the sacrum measuring 4.4 x 4.1 cm. This finding may represent a metastatic lesion. MRI with and without contrast recommended for further evaluation or bone scan. 2. Additional nodular opacities are seen in the visualized left lower lung field.  CT chest recommended. 3. No acute osseous abnormality. 4. Levoscoliosis with significant degenerative endplate changes along the concavity of the curvature. 5. Severe central canal stenosis is present at L3/L4.       .     ASSESSMENT AND PLAN  Sacral mass, on work up. Ordered immunoglobulins, CEA, consult dr Carolina for biopsy. Ordered ct scan of abdomen and pelvis.    Electronically signed by Carlos Loera MD on 6/29/2025 at 1:52 PM

## 2025-06-29 NOTE — ACP (ADVANCE CARE PLANNING)
Advance Care Planning   Healthcare Decision Maker:    Primary Decision Maker: SANDRA MARR - Spouse - [phone]    Secondary Decision Maker: Sandra Marr - Child - [phone]

## 2025-06-30 PROBLEM — M53.3 SACRAL MASS: Status: ACTIVE | Noted: 2025-06-30

## 2025-06-30 LAB
CA 125: 38 U/ML (ref 0–38)
CARCINOEMBRYONIC ANTIGEN: 12.5 NG/ML (ref 0–3.8)

## 2025-06-30 PROCEDURE — 2580000003 HC RX 258: Performed by: PAIN MEDICINE

## 2025-06-30 PROCEDURE — 2500000003 HC RX 250 WO HCPCS: Performed by: STUDENT IN AN ORGANIZED HEALTH CARE EDUCATION/TRAINING PROGRAM

## 2025-06-30 PROCEDURE — 2500000003 HC RX 250 WO HCPCS: Performed by: PAIN MEDICINE

## 2025-06-30 PROCEDURE — 6370000000 HC RX 637 (ALT 250 FOR IP): Performed by: PAIN MEDICINE

## 2025-06-30 PROCEDURE — APPSS45 APP SPLIT SHARED TIME 31-45 MINUTES: Performed by: NURSE PRACTITIONER

## 2025-06-30 PROCEDURE — 99231 SBSQ HOSP IP/OBS SF/LOW 25: CPT | Performed by: PAIN MEDICINE

## 2025-06-30 PROCEDURE — 99223 1ST HOSP IP/OBS HIGH 75: CPT | Performed by: RADIOLOGY

## 2025-06-30 PROCEDURE — 6370000000 HC RX 637 (ALT 250 FOR IP): Performed by: INTERNAL MEDICINE

## 2025-06-30 PROCEDURE — 6360000002 HC RX W HCPCS: Performed by: STUDENT IN AN ORGANIZED HEALTH CARE EDUCATION/TRAINING PROGRAM

## 2025-06-30 PROCEDURE — 1210000000 HC MED SURG R&B

## 2025-06-30 PROCEDURE — 2500000003 HC RX 250 WO HCPCS: Performed by: NURSE PRACTITIONER

## 2025-06-30 PROCEDURE — 6360000002 HC RX W HCPCS: Performed by: PAIN MEDICINE

## 2025-06-30 RX ADMIN — OXYCODONE 10 MG: 5 TABLET ORAL at 06:02

## 2025-06-30 RX ADMIN — GUAIFENESIN 200 MG: 100 LIQUID ORAL at 05:44

## 2025-06-30 RX ADMIN — GUAIFENESIN 200 MG: 100 LIQUID ORAL at 22:02

## 2025-06-30 RX ADMIN — Medication 1 TABLET: at 08:51

## 2025-06-30 RX ADMIN — PREGABALIN 100 MG: 50 CAPSULE ORAL at 22:02

## 2025-06-30 RX ADMIN — ACETAMINOPHEN 650 MG: 325 TABLET ORAL at 13:35

## 2025-06-30 RX ADMIN — DOXYCYCLINE 100 MG: 100 INJECTION, POWDER, LYOPHILIZED, FOR SOLUTION INTRAVENOUS at 22:06

## 2025-06-30 RX ADMIN — PREGABALIN 100 MG: 50 CAPSULE ORAL at 08:50

## 2025-06-30 RX ADMIN — LOSARTAN POTASSIUM 100 MG: 100 TABLET, FILM COATED ORAL at 22:02

## 2025-06-30 RX ADMIN — OXYCODONE 10 MG: 5 TABLET ORAL at 14:14

## 2025-06-30 RX ADMIN — DOXYCYCLINE 100 MG: 100 INJECTION, POWDER, LYOPHILIZED, FOR SOLUTION INTRAVENOUS at 08:54

## 2025-06-30 RX ADMIN — GUAIFENESIN 200 MG: 100 LIQUID ORAL at 10:11

## 2025-06-30 RX ADMIN — Medication 400 MG: at 22:02

## 2025-06-30 RX ADMIN — WATER 1000 MG: 1 INJECTION INTRAMUSCULAR; INTRAVENOUS; SUBCUTANEOUS at 14:14

## 2025-06-30 RX ADMIN — OXYCODONE 10 MG: 5 TABLET ORAL at 19:28

## 2025-06-30 RX ADMIN — OXYCODONE 10 MG: 5 TABLET ORAL at 10:11

## 2025-06-30 RX ADMIN — OXYCODONE 10 MG: 5 TABLET ORAL at 23:19

## 2025-06-30 RX ADMIN — SODIUM CHLORIDE, PRESERVATIVE FREE 10 ML: 5 INJECTION INTRAVENOUS at 08:51

## 2025-06-30 RX ADMIN — SODIUM CHLORIDE, PRESERVATIVE FREE 10 ML: 5 INJECTION INTRAVENOUS at 22:02

## 2025-06-30 RX ADMIN — PREGABALIN 100 MG: 50 CAPSULE ORAL at 14:13

## 2025-06-30 ASSESSMENT — PAIN DESCRIPTION - ORIENTATION
ORIENTATION: RIGHT

## 2025-06-30 ASSESSMENT — ENCOUNTER SYMPTOMS
EYES NEGATIVE: 1
VOMITING: 0
ALLERGIC/IMMUNOLOGIC NEGATIVE: 1
RESPIRATORY NEGATIVE: 1
BACK PAIN: 1
SHORTNESS OF BREATH: 0

## 2025-06-30 ASSESSMENT — PAIN SCALES - GENERAL
PAINLEVEL_OUTOF10: 7
PAINLEVEL_OUTOF10: 7
PAINLEVEL_OUTOF10: 2
PAINLEVEL_OUTOF10: 7
PAINLEVEL_OUTOF10: 7
PAINLEVEL_OUTOF10: 5
PAINLEVEL_OUTOF10: 7
PAINLEVEL_OUTOF10: 5
PAINLEVEL_OUTOF10: 7
PAINLEVEL_OUTOF10: 4
PAINLEVEL_OUTOF10: 7
PAINLEVEL_OUTOF10: 2

## 2025-06-30 ASSESSMENT — PAIN DESCRIPTION - DESCRIPTORS
DESCRIPTORS: SHARP
DESCRIPTORS: STABBING
DESCRIPTORS: ACHING;SHARP
DESCRIPTORS: ACHING;SHARP
DESCRIPTORS: SHARP;STABBING

## 2025-06-30 ASSESSMENT — PAIN DESCRIPTION - LOCATION
LOCATION: BUTTOCKS;COCCYX;LEG
LOCATION: BUTTOCKS;BACK
LOCATION: BUTTOCKS;LEG
LOCATION: BUTTOCKS;LEG
LOCATION: BACK
LOCATION: BACK;BUTTOCKS;LEG
LOCATION: COCCYX

## 2025-06-30 ASSESSMENT — PAIN DESCRIPTION - PAIN TYPE: TYPE: ACUTE PAIN

## 2025-06-30 NOTE — PROGRESS NOTES
Department of Chronic Pain Managment  Attending Progress Note      SUBJECTIVE  Low back pain    OBJECTIVE: Patient here today for initial valuation.  She has pain in her right side of her low back down her leg.  It has been going on for 6 months.  No back surgery she is not diabetic she is not on blood thinners currently does not work at physical jobs on her feet in the past.  Standing is best.  Although sitting standing bending laying down also bothers does not smoke.  She does have a CT scan ordered by Dr. Rausch  she has significant stenosis L3-4 along with lytic lesion in the right aspect of the sacrum. Tylenol Motrin do not help for pain.  Gabapentin makes her sleepy. Was started on 3 norcos a day but she got admitted through ED for severe pain on percocet about 10mg QID and Lyrica with incomplete but reasonable relief of pain will follow as needed.    Medications  Current Facility-Administered Medications: cefTRIAXone (ROCEPHIN) 1,000 mg in sterile water 10 mL IV syringe, 1,000 mg, IntraVENous, Q24H  guaiFENesin (ROBITUSSIN) 100 MG/5ML liquid 200 mg, 200 mg, Oral, Q4H While awake  pregabalin (LYRICA) capsule 100 mg, 100 mg, Oral, TID  doxycycline (VIBRAMYCIN) 100 mg in sodium chloride 0.9 % 100 mL IVPB, 100 mg, IntraVENous, Q12H  sodium chloride flush 0.9 % injection 5-40 mL, 5-40 mL, IntraVENous, 2 times per day  sodium chloride flush 0.9 % injection 5-40 mL, 5-40 mL, IntraVENous, PRN  0.9 % sodium chloride infusion, , IntraVENous, PRN  potassium chloride (KLOR-CON M) extended release tablet 40 mEq, 40 mEq, Oral, PRN **OR** potassium bicarb-citric acid (EFFER-K) effervescent tablet 40 mEq, 40 mEq, Oral, PRN **OR** potassium chloride 10 mEq/100 mL IVPB (Peripheral Line), 10 mEq, IntraVENous, PRN  magnesium sulfate 2000 mg in 50 mL IVPB premix, 2,000 mg, IntraVENous, PRN  [Held by provider] enoxaparin (LOVENOX) injection 40 mg, 40 mg, SubCUTAneous, Daily  ondansetron (ZOFRAN-ODT) disintegrating tablet 4 mg, 4  midline, no adenopathy, thyroid symmetric, not enlarged and no tenderness, skin normal  HEMATOLOGIC/LYMPHATICS:  no cervical lymphadenopathy and no supraclavicular lymphadenopathy  BACK:  Symmetric, no curvature, spinous processes are non-tender on palpation, paraspinous muscles are non-tender on palpation, no costal vertebral tenderness  LUNGS:  No increased work of breathing, good air exchange, clear to auscultation bilaterally, no crackles or wheezing  CARDIOVASCULAR:  Normal apical impulse, regular rate and rhythm, normal S1 and S2, no S3 or S4, and no murmur noted  ABDOMEN:  No scars, normal bowel sounds, soft, non-distended, non-tender, no masses palpated, no hepatosplenomegally  MUSCULOSKELETAL:  There is no redness, warmth, or swelling of the joints.  Full range of motion noted.  Motor strength is 5 out of 5 all extremities bilaterally.  Tone is normal.  NEUROLOGIC:  Awake, alert, oriented to name, place and time.  Cranial nerves II-XII are grossly intact.  Motor is 5 out of 5 bilaterally.  Cerebellar finger to nose, heel to shin intact.  Sensory is intact.  Babinski down going, Romberg negative, and gait is normal.Severe pain RT SLR no pain on Patricks.  SKIN:  no bruising or bleeding, normal skin color, texture, turgor, and no redness, warmth, or swelling  Data  CBC with Differential:    Lab Results   Component Value Date/Time    WBC 6.7 06/28/2025 05:41 AM    RBC 3.90 06/28/2025 05:41 AM    HGB 12.1 06/28/2025 05:41 AM    HCT 35.4 06/28/2025 05:41 AM     06/28/2025 05:41 AM    MCV 90.8 06/28/2025 05:41 AM    MCH 31.0 06/28/2025 05:41 AM    MCHC 34.2 06/28/2025 05:41 AM    RDW 13.4 06/28/2025 05:41 AM    LYMPHOPCT 10.9 06/28/2025 05:41 AM    MONOPCT 5.2 06/28/2025 05:41 AM    EOSPCT 0.3 06/28/2025 05:41 AM    BASOPCT 0.3 06/28/2025 05:41 AM    MONOSABS 0.4 06/28/2025 05:41 AM    LYMPHSABS 0.7 06/28/2025 05:41 AM    EOSABS 0.0 06/28/2025 05:41 AM    BASOSABS 0.0 06/28/2025 05:41 AM     BMP:    Lab

## 2025-06-30 NOTE — CONSULTS
Vascular Medicine and Interventional Radiology    Date of Consultation:2025    Mari Goodrich  : 1955  MR #: [id number]    Consultant:ISSAC Aranda - AUGIE  Consulting Physician: Dr. Jose Carolina, Vascular Medicine and Interventional Radiology     Consult Ordered By: Dr. Loera            PCP:  Elvis Moore MD     Attending Physician: Kira Rees DO     Date of Admission: 2025 11:29 AM     Chief Complaint:   Chief Complaint   Patient presents with    Back Pain      Reason for Consultation: Biopsy of sacral mass    History of Present Illness: 70 year old female admitted 3 days ago with intractable back pain with lumbar radiculopathy and possible left lower lobe pneumonia although lesions cannot be ruled out.  She has history of breast cancer in the 90s.  She reports for the past 6 months struggling with lower back pain.  Taking Tylenol and ibuprofen as needed.  Initially they were helping, however they are not working anymore. States she went to her chiropractor and felt the pain was improving.  However, she experienced a fall 2025, and since then she has continued to have progressive pain to the point where last Friday nothing was helping and she presented to the ER.  Recent imaging including MRI concerning for metastatic process with destructive mass lesion centered at the right sacrum extending into the sacral canal involving the right S1-2 and S2-3 neural foramina with extraosseous extension into the adjacent soft tissues along with scattered and additional osseous enhancing lesions in the spine.  IR consulted for sacral mass biopsy.  Vital signs stable, afebrile.  She additionally endorses nausea, however no vomiting prior to admit that has since resolved.  She reports her chest feels \"tight\" when taking a deep breath. She denies chest pain, shortness of breath, or abdominal pain.  She denies anticoagulation therapy at home. Does report baby Aspirin use, although has not had since     Stomach Cancer Mother     Alcohol Abuse Father     Heart Disease Father     Diabetes type 2  Father     Heart Attack Father     Diabetes Father     Diabetes type 2  Brother     Lung Cancer Brother     Leukemia Maternal Grandmother     Asthma Paternal Grandmother     Diabetes Brother       SocialHistory:    Social History     Socioeconomic History    Marital status:      Spouse name: Not on file    Number of children: Not on file    Years of education: Not on file    Highest education level: Not on file   Occupational History    Not on file   Tobacco Use    Smoking status: Never    Smokeless tobacco: Never   Substance and Sexual Activity    Alcohol use: No    Drug use: No    Sexual activity: Not Currently     Partners: Male   Other Topics Concern    Not on file   Social History Narrative    Not on file     Social Drivers of Health     Financial Resource Strain: Low Risk  (4/4/2023)    Overall Financial Resource Strain (CARDIA)     Difficulty of Paying Living Expenses: Not hard at all   Food Insecurity: No Food Insecurity (6/27/2025)    Hunger Vital Sign     Worried About Running Out of Food in the Last Year: Never true     Ran Out of Food in the Last Year: Never true   Transportation Needs: No Transportation Needs (6/27/2025)    PRAPARE - Transportation     Lack of Transportation (Medical): No     Lack of Transportation (Non-Medical): No   Physical Activity: Sufficiently Active (1/2/2025)    Exercise Vital Sign     Days of Exercise per Week: 5 days     Minutes of Exercise per Session: 150+ min   Stress: Not on file   Social Connections: Not on file   Intimate Partner Violence: Not on file   Housing Stability: Low Risk  (6/27/2025)    Housing Stability Vital Sign     Unable to Pay for Housing in the Last Year: No     Number of Times Moved in the Last Year: 0     Homeless in the Last Year: No        Review of Systems:    Review of Systems   Constitutional: Negative.  Negative for chills and fever.   HENT:  Mood and Affect: Mood normal.         Behavior: Behavior normal.         Thought Content: Thought content normal.         Judgment: Judgment normal.       Lab Results   Component Value Date    WBC 6.7 06/28/2025    HGB 12.1 06/28/2025    HCT 35.4 (L) 06/28/2025    MCV 90.8 06/28/2025     06/28/2025     Lab Results   Component Value Date     06/28/2025    K 4.3 06/28/2025     06/28/2025    CO2 23 06/28/2025    BUN 18 06/28/2025    CREATININE 0.61 06/28/2025    GLUCOSE 121 (H) 06/28/2025    CALCIUM 9.5 06/28/2025    BILITOT 0.4 06/27/2025    ALKPHOS 122 06/27/2025    AST 22 06/27/2025    ALT 10 06/27/2025    LABGLOM >90.0 06/28/2025    GFRAA >60.0 06/12/2021    GLOB 3.1 06/27/2025       No results found for: \"INR\", \"PROTIME\"    MRI Result (most recent):  MRI LUMBAR SPINE W WO CONTRAST 06/28/2025    Narrative  EXAM:  MR Lumbar Spine with and without intravenous contrast.  06/28/2025 02:23:11 PM    TECHNIQUE:  Multiplanar multisequence MRI of the lumbar spine was performed with and without the administration of intravenous contrast.    COMPARISON:  06/23/2025    CLINICAL HISTORY:  Sacral Mass, L3-4 stenosis.    FINDINGS:    BONES AND ALIGNMENT:  Degenerative disc disease and facet arthropathy. Endplate degenerative changes. Left convex scoliosis. Degenerative changes at the sacroiliac joints.      There is a destructive mass lesion centered at the right sacrum, extending over 6.7 x 4.4 x 7.1 cm. This lesion extends into the sacral canal. It involves the right S1-2 and S2-3 neural foramina. This involves the bone and there is extraosseous extension  as well into the adjacent soft tissues. This lesion has some heterogeneous signal with T1 hyperintensity, possibly reflecting some mineralization or blood products.  There are scattered additional osseous enhancing lesions in the spine.    SPINAL CORD:  The conus terminates at the L1-2 level.      T11-T12:  Disc bulge and facet arthropathy. Mild central

## 2025-06-30 NOTE — PROGRESS NOTES
Shift assessment complete. VSS. A&Ox4. Patient is calm and cooperative. Denies having chest pain, dyspnea on exertion, or nausea. On room ir. Lung sounds are clear. Abdomen is soft and round. Bowel sounds are active. Tolerating diet. Medications given per MAR. Patient complaining of 7/10 sacral pain radiating to right leg. Medicated with oxycodone as needed per order. No numbness noted to extremities per patient. Patient up in room independently.  Currently in bed with call light in reach. Bed in lowest position. No needs at this time. Care ongoing    - Per Maya, patient is to have Sacral Biopsy on Wednesday @ 1pm. Patient informed and made aware.    Electronically signed by Ajit Rodríguez RN on 6/30/2025 at 3:23 PM

## 2025-06-30 NOTE — CARE COORDINATION
Met with patient at bedside to discuss discharge plan. Patient awaiting biopsy-to be done on Wednesday by IR. Patient denies home going needs. Discharge plan home no needs.

## 2025-06-30 NOTE — PROGRESS NOTES
Internal Medicine   Hospitalist   Progress Note    2025   2:02 PM    Name:  Mari Goodrich  MRN:    [id number]     IP Day: 3     Admit Date: 2025 11:29 AM  PCP: Elvis Moore MD    Code Status:  Full Code    Assessment and Plan:        Active Problems/ diagnosis:     Intractable back pain with lumbar radiculopathy due to underlying sacral mass-likely malignant  Possible left lower lobe pneumonia-though lesions cannot be ruled out  HTN  History of breast cancer-in the     Plan  Plan for biopsy by IR of the sacral mass tomorrow  As needed pain control  Pain management is following-status post the right sided transforaminal epidural steroid injection  Resume home medications  Overall presentation not consistent with pneumonia, will need further outpatient chest imaging  Oncology follow-up  Discussed with patient the importance of oncology and PCP follow-up to further workup for possible cancer    DVT PPx     7 pm- 7 am, please contact on call Hospitalist for any needs     Subjective:      no new events.  Pain is controlled.    Physical Examination:      Vitals:  BP (!) 142/68   Pulse 82   Temp 98.2 °F (36.8 °C) (Oral)   Resp 16   Ht 1.575 m (5' 2\")   Wt 86.2 kg (190 lb)   SpO2 92%   BMI 34.75 kg/m²   Temp (24hrs), Av.2 °F (36.8 °C), Min:98.1 °F (36.7 °C), Max:98.2 °F (36.8 °C)      General appearance: alert, cooperative and no distress  Mental Status: oriented to person, place and time and normal affect  Lungs: clear to auscultation bilaterally, normal effort  Heart: regular rate and rhythm, no murmur  Abdomen: soft, nontender, nondistended, bowel sounds present, no masses  Extremities: no edema, redness, tenderness in the calves  Skin: no gross lesions, rashes    Data:     Labs:  Recent Labs     25  0541   WBC 6.7   HGB 12.1        Recent Labs     25  0541      K 4.3      CO2 23   BUN 18   CREATININE 0.61   GLUCOSE 121*     No results for input(s): \"AST\", \"ALT\",  \"BILITOT\", \"ALKPHOS\" in the last 72 hours.    Invalid input(s): \"ALB\"    Current Facility-Administered Medications   Medication Dose Route Frequency Provider Last Rate Last Admin    cefTRIAXone (ROCEPHIN) 1,000 mg in sterile water 10 mL IV syringe  1,000 mg IntraVENous Q24H Safia Caba MD   1,000 mg at 06/29/25 1447    guaiFENesin (ROBITUSSIN) 100 MG/5ML liquid 200 mg  200 mg Oral Q4H While awake Bolzan-Roche, Mario, APRN - CNP   200 mg at 06/30/25 1011    pregabalin (LYRICA) capsule 100 mg  100 mg Oral TID Sawyer Lyons MD   100 mg at 06/30/25 0850    doxycycline (VIBRAMYCIN) 100 mg in sodium chloride 0.9 % 100 mL IVPB  100 mg IntraVENous Q12H Sawyer Lyons MD   Stopped at 06/30/25 1015    sodium chloride flush 0.9 % injection 5-40 mL  5-40 mL IntraVENous 2 times per day Sawyer Lyons MD   10 mL at 06/30/25 0851    sodium chloride flush 0.9 % injection 5-40 mL  5-40 mL IntraVENous PRN Sawyer Lyons MD        0.9 % sodium chloride infusion   IntraVENous PRN Sawyer Lyons MD        potassium chloride (KLOR-CON M) extended release tablet 40 mEq  40 mEq Oral PRN Sawyer Lyons MD        Or    potassium bicarb-citric acid (EFFER-K) effervescent tablet 40 mEq  40 mEq Oral PRN Sawyer Lyons MD        Or    potassium chloride 10 mEq/100 mL IVPB (Peripheral Line)  10 mEq IntraVENous PRN Sawyer Lyons MD        magnesium sulfate 2000 mg in 50 mL IVPB premix  2,000 mg IntraVENous PRN Sawyer Lyons MD        [Held by provider] enoxaparin (LOVENOX) injection 40 mg  40 mg SubCUTAneous Daily Sawyer Lyons MD   40 mg at 06/29/25 0844    ondansetron (ZOFRAN-ODT) disintegrating tablet 4 mg  4 mg Oral Q8H PRN Sawyer Lyons MD        Or    ondansetron (ZOFRAN) injection 4 mg  4 mg IntraVENous Q6H PRN Sawyer Lynos MD        polyethylene glycol (GLYCOLAX) packet 17 g  17 g Oral Daily PRN Sawyer Lyons MD        acetaminophen (TYLENOL) tablet 650 mg  650 mg Oral Q6H PRN Sawyer Lyons MD   650 mg at 06/30/25 9546

## 2025-06-30 NOTE — PROGRESS NOTES
Hospitalist Progress Note      PCP: Elvis Moore MD    Date of Admission: 6/27/2025    Chief Complaint:    Chief Complaint   Patient presents with    Back Pain     Subjective:  No acute events overnight. Pt standing in her room due to continued low back pain. Denies chest pain or shortness of breath.    Medications:  Reviewed    Infusion Medications    sodium chloride       Scheduled Medications    cefTRIAXone (ROCEPHIN) IV  1,000 mg IntraVENous Q24H    guaiFENesin  200 mg Oral Q4H While awake    pregabalin  100 mg Oral TID    doxycycline (VIBRAMYCIN) IV  100 mg IntraVENous Q12H    sodium chloride flush  5-40 mL IntraVENous 2 times per day    enoxaparin  40 mg SubCUTAneous Daily    losartan  100 mg Oral Daily    magnesium oxide  400 mg Oral Daily    therapeutic multivitamin-minerals  1 tablet Oral Daily     PRN Meds: sodium chloride flush, sodium chloride, potassium chloride **OR** potassium alternative oral replacement **OR** potassium chloride, magnesium sulfate, ondansetron **OR** ondansetron, polyethylene glycol, acetaminophen **OR** acetaminophen, oxyCODONE **OR** oxyCODONE, ibuprofen, meclizine    No intake or output data in the 24 hours ending 06/30/25 0712    Exam:  BP (!) 130/59   Pulse 94   Temp 98.4 °F (36.9 °C) (Oral)   Resp 16   Ht 1.575 m (5' 2\")   Wt 86.2 kg (190 lb)   SpO2 94%   BMI 34.75 kg/m²   Physical Exam  Cardiovascular:      Rate and Rhythm: Normal rate and regular rhythm.   Pulmonary:      Effort: Pulmonary effort is normal. No respiratory distress.   Abdominal:      Palpations: Abdomen is soft.      Tenderness: There is no abdominal tenderness.   Neurological:      Mental Status: She is alert and oriented to person, place, and time.   Psychiatric:         Mood and Affect: Mood normal.         Behavior: Behavior normal.       Labs:   Recent Labs     06/27/25  1204 06/28/25  0541   WBC 8.5 6.7   HGB 13.3 12.1   HCT 39.5 35.4*    259     Recent Labs     06/27/25  1204

## 2025-06-30 NOTE — PROGRESS NOTES
Hospitalist Progress Note      PCP: Elvis Moore MD    Date of Admission: 6/27/2025    Chief Complaint:    Chief Complaint   Patient presents with    Back Pain     Subjective:  No acute events overnight. Pt standing in her room due to continued low back pain. Denies chest pain or shortness of breath.    Medications:  Reviewed    Infusion Medications    sodium chloride       Scheduled Medications    cefTRIAXone (ROCEPHIN) IV  1,000 mg IntraVENous Q24H    guaiFENesin  200 mg Oral Q4H While awake    pregabalin  100 mg Oral TID    doxycycline (VIBRAMYCIN) IV  100 mg IntraVENous Q12H    sodium chloride flush  5-40 mL IntraVENous 2 times per day    [Held by provider] enoxaparin  40 mg SubCUTAneous Daily    losartan  100 mg Oral Daily    magnesium oxide  400 mg Oral Daily    therapeutic multivitamin-minerals  1 tablet Oral Daily     PRN Meds: sodium chloride flush, sodium chloride, potassium chloride **OR** potassium alternative oral replacement **OR** potassium chloride, magnesium sulfate, ondansetron **OR** ondansetron, polyethylene glycol, acetaminophen **OR** acetaminophen, oxyCODONE **OR** oxyCODONE, ibuprofen, meclizine    No intake or output data in the 24 hours ending 06/30/25 0718    Exam:  BP (!) 114/58  Pulse 70  Temp 98.2 °F (36.8 °C)  Resp 18  Ht 1.575 m (5' 2\")  Wt 86.2 kg (190 lb)  SpO2 94%  BMI 34.75 kg/m²   Physical Exam  Cardiovascular:      Rate and Rhythm: Normal rate and regular rhythm.   Pulmonary:      Effort: Pulmonary effort is normal. No respiratory distress.   Abdominal:      Palpations: Abdomen is soft.      Tenderness: There is no abdominal tenderness.   Neurological:      Mental Status: She is alert and oriented to person, place, and time.   Psychiatric:         Mood and Affect: Mood normal.         Behavior: Behavior normal.       Labs:   Recent Labs     06/27/25  1204 06/28/25  0541   WBC 8.5 6.7   HGB 13.3 12.1   HCT 39.5 35.4*    259     Recent Labs     06/27/25  1204

## 2025-06-30 NOTE — PROGRESS NOTES
Hematology/Oncology   Progress Note        CHIEF COMPLAINT/HPI:  Follow up of sacral mass. CEA is elevated at 12.5. Ca 125 is normal. Scheduled for biopsy for 7/2/2025. Pain is under control by pain management. Has mild headache. Ordered MRI of brain.    REVIEW OF SYSTEMS:    Unremarkable except for symptoms mentioned in HPI.    Current Inpatient Medications:    Current Facility-Administered Medications   Medication Dose Route Frequency Provider Last Rate Last Admin    cefTRIAXone (ROCEPHIN) 1,000 mg in sterile water 10 mL IV syringe  1,000 mg IntraVENous Q24H Safia Caba MD   1,000 mg at 06/30/25 1414    guaiFENesin (ROBITUSSIN) 100 MG/5ML liquid 200 mg  200 mg Oral Q4H While awake Mario Mcnamara APRN - CNP   200 mg at 06/30/25 1011    pregabalin (LYRICA) capsule 100 mg  100 mg Oral TID Sawyer Lyons MD   100 mg at 06/30/25 1413    doxycycline (VIBRAMYCIN) 100 mg in sodium chloride 0.9 % 100 mL IVPB  100 mg IntraVENous Q12H Sawyer Lyons MD   Stopped at 06/30/25 1015    sodium chloride flush 0.9 % injection 5-40 mL  5-40 mL IntraVENous 2 times per day Sawyer Lyons MD   10 mL at 06/30/25 0851    sodium chloride flush 0.9 % injection 5-40 mL  5-40 mL IntraVENous PRN Sawyer Lyons MD        0.9 % sodium chloride infusion   IntraVENous PRN Sawyer Lyons MD        potassium chloride (KLOR-CON M) extended release tablet 40 mEq  40 mEq Oral PRN Sawyer Lyons MD        Or    potassium bicarb-citric acid (EFFER-K) effervescent tablet 40 mEq  40 mEq Oral PRN Sawyer Lyons MD        Or    potassium chloride 10 mEq/100 mL IVPB (Peripheral Line)  10 mEq IntraVENous PRN Sawyer Lyons MD        magnesium sulfate 2000 mg in 50 mL IVPB premix  2,000 mg IntraVENous PRN Sawyer Lyons MD        [Held by provider] enoxaparin (LOVENOX) injection 40 mg  40 mg SubCUTAneous Daily Sawyer Lyons MD   40 mg at 06/29/25 0844    ondansetron (ZOFRAN-ODT) disintegrating tablet 4 mg  4 mg Oral Q8H PRN Sawyer Lyons MD    non-tender, no masses palpated, no hepatosplenomegally    MUSCULOSKELETAL:  There is no redness, warmth, or swelling of the joints.  Full range of motion noted.  Motor strength is 5 out of 5 all extremities bilaterally.  Tone is normal.  EXTREMITIES:    NEURO:    DATA:      PT/INR:  No results found for: \"PTINR\"  PTT:  No results found for: \"APTT\"  CMP:    Lab Results   Component Value Date/Time     06/28/2025 05:41 AM    K 4.3 06/28/2025 05:41 AM     06/28/2025 05:41 AM    CO2 23 06/28/2025 05:41 AM    BUN 18 06/28/2025 05:41 AM     Magnesium:  No results found for: \"MG\"  Phosphorus:  No components found for: \"PO4\"  Calcium:  No components found for: \"CA\"  CBC:    Lab Results   Component Value Date/Time    WBC 6.7 06/28/2025 05:41 AM    RBC 3.90 06/28/2025 05:41 AM    HGB 12.1 06/28/2025 05:41 AM    HCT 35.4 06/28/2025 05:41 AM    MCV 90.8 06/28/2025 05:41 AM    RDW 13.4 06/28/2025 05:41 AM     06/28/2025 05:41 AM     DIFF:    Lab Results   Component Value Date/Time    MCV 90.8 06/28/2025 05:41 AM    RDW 13.4 06/28/2025 05:41 AM      LDH:  No results found for: \"LDH\"  Uric Acid:  No components found for: \"URIC\"    EKG Reviewed  Appropriate Radiology Reviewed      Pathology: Reviewed where indicated      ASSESSMENT:  Principal Problem:    Intractable low back pain  Active Problems:    Lumbar radiculitis    Sacral mass  Resolved Problems:    * No resolved hospital problems. *    Patient Active Problem List   Diagnosis    Degenerative disc disease, cervical    Carpal tunnel syndrome of right wrist    Essential hypertension    Mixed hyperlipidemia    History of left breast cancer    H/O total hysterectomy    FH: diabetes mellitus    Prediabetes    Intractable low back pain    Lumbar radiculitis    Sacral mass       PLAN:  Brain MRI fro headache.          Electronically signed by Carlos Loera MD on 6/30/25 at 3:57 PM EDT

## 2025-07-01 ENCOUNTER — APPOINTMENT (OUTPATIENT)
Dept: MRI IMAGING | Age: 70
DRG: 551 | End: 2025-07-01
Attending: INTERNAL MEDICINE
Payer: COMMERCIAL

## 2025-07-01 LAB
INR PPP: 1
PROTHROMBIN TIME: 13.7 SEC (ref 12.3–14.9)

## 2025-07-01 PROCEDURE — 36415 COLL VENOUS BLD VENIPUNCTURE: CPT

## 2025-07-01 PROCEDURE — 6360000004 HC RX CONTRAST MEDICATION: Performed by: FAMILY MEDICINE

## 2025-07-01 PROCEDURE — 6360000002 HC RX W HCPCS: Performed by: STUDENT IN AN ORGANIZED HEALTH CARE EDUCATION/TRAINING PROGRAM

## 2025-07-01 PROCEDURE — 1210000000 HC MED SURG R&B

## 2025-07-01 PROCEDURE — 2500000003 HC RX 250 WO HCPCS: Performed by: PAIN MEDICINE

## 2025-07-01 PROCEDURE — 70553 MRI BRAIN STEM W/O & W/DYE: CPT

## 2025-07-01 PROCEDURE — 6360000002 HC RX W HCPCS: Performed by: PAIN MEDICINE

## 2025-07-01 PROCEDURE — 6370000000 HC RX 637 (ALT 250 FOR IP): Performed by: PAIN MEDICINE

## 2025-07-01 PROCEDURE — A9577 INJ MULTIHANCE: HCPCS | Performed by: FAMILY MEDICINE

## 2025-07-01 PROCEDURE — 2500000003 HC RX 250 WO HCPCS: Performed by: STUDENT IN AN ORGANIZED HEALTH CARE EDUCATION/TRAINING PROGRAM

## 2025-07-01 PROCEDURE — 2580000003 HC RX 258: Performed by: PAIN MEDICINE

## 2025-07-01 PROCEDURE — 85610 PROTHROMBIN TIME: CPT

## 2025-07-01 PROCEDURE — 6370000000 HC RX 637 (ALT 250 FOR IP): Performed by: INTERNAL MEDICINE

## 2025-07-01 RX ADMIN — OXYCODONE 10 MG: 5 TABLET ORAL at 13:08

## 2025-07-01 RX ADMIN — OXYCODONE 10 MG: 5 TABLET ORAL at 08:41

## 2025-07-01 RX ADMIN — OXYCODONE 10 MG: 5 TABLET ORAL at 21:33

## 2025-07-01 RX ADMIN — LOSARTAN POTASSIUM 100 MG: 100 TABLET, FILM COATED ORAL at 21:32

## 2025-07-01 RX ADMIN — DOXYCYCLINE 100 MG: 100 INJECTION, POWDER, LYOPHILIZED, FOR SOLUTION INTRAVENOUS at 08:44

## 2025-07-01 RX ADMIN — SODIUM CHLORIDE, PRESERVATIVE FREE 10 ML: 5 INJECTION INTRAVENOUS at 21:33

## 2025-07-01 RX ADMIN — Medication 1 TABLET: at 08:41

## 2025-07-01 RX ADMIN — PREGABALIN 100 MG: 50 CAPSULE ORAL at 08:41

## 2025-07-01 RX ADMIN — PREGABALIN 100 MG: 50 CAPSULE ORAL at 13:53

## 2025-07-01 RX ADMIN — DOXYCYCLINE 100 MG: 100 INJECTION, POWDER, LYOPHILIZED, FOR SOLUTION INTRAVENOUS at 21:39

## 2025-07-01 RX ADMIN — GADOBENATE DIMEGLUMINE 15 ML: 529 INJECTION, SOLUTION INTRAVENOUS at 10:52

## 2025-07-01 RX ADMIN — WATER 1000 MG: 1 INJECTION INTRAMUSCULAR; INTRAVENOUS; SUBCUTANEOUS at 13:53

## 2025-07-01 RX ADMIN — OXYCODONE 10 MG: 5 TABLET ORAL at 18:04

## 2025-07-01 RX ADMIN — Medication 400 MG: at 21:32

## 2025-07-01 RX ADMIN — PREGABALIN 100 MG: 50 CAPSULE ORAL at 21:32

## 2025-07-01 ASSESSMENT — PAIN DESCRIPTION - ORIENTATION
ORIENTATION: RIGHT

## 2025-07-01 ASSESSMENT — PAIN SCALES - GENERAL
PAINLEVEL_OUTOF10: 7
PAINLEVEL_OUTOF10: 8
PAINLEVEL_OUTOF10: 2
PAINLEVEL_OUTOF10: 7
PAINLEVEL_OUTOF10: 8
PAINLEVEL_OUTOF10: 3
PAINLEVEL_OUTOF10: 4
PAINLEVEL_OUTOF10: 8
PAINLEVEL_OUTOF10: 4
PAINLEVEL_OUTOF10: 7

## 2025-07-01 ASSESSMENT — PAIN DESCRIPTION - LOCATION
LOCATION: LEG;COCCYX
LOCATION: COCCYX;BUTTOCKS;LEG
LOCATION: BACK
LOCATION: BACK
LOCATION: COCCYX;BACK;LEG
LOCATION: BACK;COCCYX;LEG
LOCATION: LEG;COCCYX

## 2025-07-01 ASSESSMENT — PAIN DESCRIPTION - DESCRIPTORS
DESCRIPTORS: STABBING
DESCRIPTORS: STABBING
DESCRIPTORS: SHARP
DESCRIPTORS: SORE;SHARP
DESCRIPTORS: STABBING

## 2025-07-01 ASSESSMENT — PAIN - FUNCTIONAL ASSESSMENT: PAIN_FUNCTIONAL_ASSESSMENT: PREVENTS OR INTERFERES SOME ACTIVE ACTIVITIES AND ADLS

## 2025-07-01 ASSESSMENT — PAIN DESCRIPTION - PAIN TYPE: TYPE: ACUTE PAIN

## 2025-07-01 NOTE — PROGRESS NOTES
Spiritual Health History and Assessment/Progress Note  [facility]    Initial Encounter,  ,  ,      Name: Mari Goodrich MRN: [id number]    Age: 70 y.o.     Sex: female   Language: English   Mandaeism: Temple   Intractable low back pain     Date: 7/1/2025            Total Time Calculated: 35 min              Patient alert, calm, some anxiety around recent news of cancer, tearful at times, concerns about family.  Pt engaged in conversation about illness, Temple abril, family and strong connection to God, who helped her through cancer 30 years ago. Pt supported by spouse, son, friends/neighbors and abril community.   provided active listening, ministry of presence, encouragement, prayer and read Pt favorite scripture from Bible.  Pt calm, peaceful, hopeful, less anxious and expressed gratitude.       [place] [facility] [place]  [facility]        Referral/Consult From: Rounding   Encounter Overview/Reason: Initial Encounter  Service Provided For: Patient    Abril, Belief, Meaning:   Patient is connected with a abril tradition or spiritual practice  Family/Friends No family/friends present      Importance and Influence:  Patient has spiritual/personal beliefs that influence decisions regarding their health  Family/Friends No family/friends present    Community:  Patient is connected with a spiritual community and feels well-supported. Support system includes: Spouse/Partner, Children, Abril Community, Friends, and Extended family  Family/Friends No family/friends present    Assessment and Plan of Care:     Patient Interventions include: Facilitated expression of thoughts and feelings, Explored spiritual coping/struggle/distress, Engaged in theological reflection, Affirmed coping skills/support systems, and Provided sacramental/Zoroastrianism ritual  Family/Friends Interventions include: No family/friends present    Patient Plan of Care: Spiritual Care available upon further  referral  Family/Friends Plan of Care: No family/friends present    Electronically signed by Chaplain Shayy Intern on 7/1/2025 at 6:14 PM

## 2025-07-01 NOTE — PROGRESS NOTES
Shift assessment complete. VSS. A&Ox4. Patient is calm and cooperative. Denies having chest pain, dyspnea on exertion, or nausea. On room air. Lung sounds are clear. Abdomen is soft and round. Bowel sounds are active. One bowel movement this morning per patient. Tolerating diet. Medications given per MAR. Patient complaining of 8/10 sacral pain, right leg pain, and back pain. Medicated with oxycodone as needed per order. Pain reassessment 3/10. No numbness noted to extremities per patient. Patient up in room independently. Currently in bed with call light in reach. Bed in lowest position. No needs at this time. Care ongoing.   - New #22 placed right wrist   1000 - Patient off floor currently in MRI     Electronically signed by Ajit Rodríguez RN on 7/1/2025 at 10:36 AM     - New #22 placed right forearm    Sacral Biopsy on Wednesday @ 1pm. NPO at midnight

## 2025-07-01 NOTE — PROGRESS NOTES
Internal Medicine   Hospitalist   Progress Note    2025   3:03 PM    Name:  Mari Goodrich  MRN:    [id number]     IP Day: 4     Admit Date: 2025 11:29 AM  PCP: Elvis Moore MD    Code Status:  Full Code    Assessment and Plan:        Active Problems/ diagnosis:     Intractable back pain with lumbar radiculopathy due to underlying sacral mass-likely malignant  Possible left lower lobe pneumonia-though lesions cannot be ruled out  HTN  History of breast cancer-in the     Plan  Awaiting sacral mass biopsy  As needed pain control  Pain management is following-status post the right sided transforaminal epidural steroid injection  Resume home medications  Overall presentation not consistent with pneumonia, will need further outpatient chest imaging  Oncology follow-up  Discussed with patient the importance of oncology and PCP follow-up to further workup for possible cancer    DVT PPx     7 pm- 7 am, please contact on call Hospitalist for any needs     Subjective:      no new events.  Pain is controlled.    Physical Examination:      Vitals:  BP (!) 133/53   Pulse 81   Temp 98.8 °F (37.1 °C) (Oral)   Resp 18   Ht 1.575 m (5' 2\")   Wt 86.2 kg (190 lb)   SpO2 95%   BMI 34.75 kg/m²   Temp (24hrs), Av.3 °F (36.8 °C), Min:97.9 °F (36.6 °C), Max:98.8 °F (37.1 °C)      General appearance: alert, cooperative and no distress  Mental Status: oriented to person, place and time and normal affect  Lungs: clear to auscultation bilaterally, normal effort  Heart: regular rate and rhythm, no murmur  Abdomen: soft, nontender, nondistended, bowel sounds present, no masses  Extremities: no edema, redness, tenderness in the calves  Skin: no gross lesions, rashes    Data:     Labs:  No results for input(s): \"WBC\", \"HGB\", \"PLT\" in the last 72 hours.    No results for input(s): \"NA\", \"K\", \"CL\", \"CO2\", \"BUN\", \"CREATININE\", \"GLUCOSE\" in the last 72 hours.    No results for input(s): \"AST\", \"ALT\", \"BILITOT\", \"ALKPHOS\" in the  last 72 hours.    Invalid input(s): \"ALB\"    Current Facility-Administered Medications   Medication Dose Route Frequency Provider Last Rate Last Admin    cefTRIAXone (ROCEPHIN) 1,000 mg in sterile water 10 mL IV syringe  1,000 mg IntraVENous Q24H Safia Caba MD   1,000 mg at 07/01/25 1353    guaiFENesin (ROBITUSSIN) 100 MG/5ML liquid 200 mg  200 mg Oral Q4H While awake Bolzan-Roche, Mario, APRN - CNP   200 mg at 06/30/25 2202    pregabalin (LYRICA) capsule 100 mg  100 mg Oral TID Sawyer Lyons MD   100 mg at 07/01/25 1353    doxycycline (VIBRAMYCIN) 100 mg in sodium chloride 0.9 % 100 mL IVPB  100 mg IntraVENous Q12H Sawyer Lyons MD   Stopped at 07/01/25 1012    sodium chloride flush 0.9 % injection 5-40 mL  5-40 mL IntraVENous 2 times per day Sawyer Lyons MD   10 mL at 06/30/25 2202    sodium chloride flush 0.9 % injection 5-40 mL  5-40 mL IntraVENous PRN Sawyer Lyons MD        0.9 % sodium chloride infusion   IntraVENous PRN Sawyer Lyons MD        potassium chloride (KLOR-CON M) extended release tablet 40 mEq  40 mEq Oral PRN Sawyer Lyons MD        Or    potassium bicarb-citric acid (EFFER-K) effervescent tablet 40 mEq  40 mEq Oral PRN Sawyer Lyons MD        Or    potassium chloride 10 mEq/100 mL IVPB (Peripheral Line)  10 mEq IntraVENous PRN Sawyer Lyons MD        magnesium sulfate 2000 mg in 50 mL IVPB premix  2,000 mg IntraVENous PRN Sawyer Lyons MD        [Held by provider] enoxaparin (LOVENOX) injection 40 mg  40 mg SubCUTAneous Daily Sawyer Lyons MD   40 mg at 06/29/25 0844    ondansetron (ZOFRAN-ODT) disintegrating tablet 4 mg  4 mg Oral Q8H PRN Sawyer Lyons MD        Or    ondansetron (ZOFRAN) injection 4 mg  4 mg IntraVENous Q6H PRN Sawyer Lyons MD        polyethylene glycol (GLYCOLAX) packet 17 g  17 g Oral Daily PRN Sawyer Lyons MD        acetaminophen (TYLENOL) tablet 650 mg  650 mg Oral Q6H PRN Sawyer Lyons MD   650 mg at 06/30/25 1335    Or    acetaminophen

## 2025-07-01 NOTE — PROGRESS NOTES
Hematology/Oncology   Progress Note        CHIEF COMPLAINT/HPI:  Follow up of sacral mass. MRI of brain done but not reported. For biopsy in am. Immunoglobulins not reported.     REVIEW OF SYSTEMS:    Unremarkable except for symptoms mentioned in HPI.    Current Inpatient Medications:    Current Facility-Administered Medications   Medication Dose Route Frequency Provider Last Rate Last Admin    cefTRIAXone (ROCEPHIN) 1,000 mg in sterile water 10 mL IV syringe  1,000 mg IntraVENous Q24H Safia Caba MD   1,000 mg at 07/01/25 1353    guaiFENesin (ROBITUSSIN) 100 MG/5ML liquid 200 mg  200 mg Oral Q4H While awake Bolzan-Roche, Mario, APRN - CNP   200 mg at 06/30/25 2202    pregabalin (LYRICA) capsule 100 mg  100 mg Oral TID Sawyer Lyons MD   100 mg at 07/01/25 1353    doxycycline (VIBRAMYCIN) 100 mg in sodium chloride 0.9 % 100 mL IVPB  100 mg IntraVENous Q12H Sawyer Lyons MD   Stopped at 07/01/25 1012    sodium chloride flush 0.9 % injection 5-40 mL  5-40 mL IntraVENous 2 times per day Sawyer Lyons MD   10 mL at 06/30/25 2202    sodium chloride flush 0.9 % injection 5-40 mL  5-40 mL IntraVENous PRN Sawyer Lyons MD        0.9 % sodium chloride infusion   IntraVENous PRN Sawyer Lyons MD        potassium chloride (KLOR-CON M) extended release tablet 40 mEq  40 mEq Oral PRN Sawyer Lyons MD        Or    potassium bicarb-citric acid (EFFER-K) effervescent tablet 40 mEq  40 mEq Oral PRN Sawyer Lyons MD        Or    potassium chloride 10 mEq/100 mL IVPB (Peripheral Line)  10 mEq IntraVENous PRN Sawyer Lyons MD        magnesium sulfate 2000 mg in 50 mL IVPB premix  2,000 mg IntraVENous PRN Sawyer Lyons MD        [Held by provider] enoxaparin (LOVENOX) injection 40 mg  40 mg SubCUTAneous Daily Sawyer Lyons MD   40 mg at 06/29/25 0844    ondansetron (ZOFRAN-ODT) disintegrating tablet 4 mg  4 mg Oral Q8H PRN Sawyer Lyons MD        Or    ondansetron (ZOFRAN) injection 4 mg  4 mg IntraVENous Q6H PRN

## 2025-07-01 NOTE — CARE COORDINATION
MET WITH PATIENT AT BEDSIDE TO DISCUSS DISCHARGE PLAN. PATIENT RESTING IN BED PATIENT STATES SHE IS NOT HAVING A GOOD DAY DUE TO PAIN AND EMOTIONAL TODAY. PATIENT STATES SHE JUST RECEIVED PAIN MEDICATION 20 MINUTES AGO.  AT BEDSIDE. PATIENT TO HAVE BIOPSY TOMORROW. PATIENT DENIES HOME GOING NEEDS. DISCHARGE PLAN HOME NO NEEDS.

## 2025-07-02 ENCOUNTER — APPOINTMENT (OUTPATIENT)
Dept: CT IMAGING | Age: 70
End: 2025-07-02
Payer: COMMERCIAL

## 2025-07-02 VITALS
DIASTOLIC BLOOD PRESSURE: 62 MMHG | WEIGHT: 190 LBS | HEIGHT: 62 IN | OXYGEN SATURATION: 94 % | RESPIRATION RATE: 15 BRPM | TEMPERATURE: 98.2 F | BODY MASS INDEX: 34.96 KG/M2 | SYSTOLIC BLOOD PRESSURE: 125 MMHG | HEART RATE: 79 BPM

## 2025-07-02 LAB
ANION GAP SERPL CALCULATED.3IONS-SCNC: 14 MEQ/L (ref 9–15)
BASOPHILS # BLD: 0 K/UL (ref 0–0.2)
BASOPHILS NFR BLD: 0.5 %
BUN SERPL-MCNC: 21 MG/DL (ref 8–23)
CALCIUM SERPL-MCNC: 9.5 MG/DL (ref 8.5–9.9)
CHLORIDE SERPL-SCNC: 100 MEQ/L (ref 95–107)
CO2 SERPL-SCNC: 25 MEQ/L (ref 20–31)
CREAT SERPL-MCNC: 0.79 MG/DL (ref 0.5–0.9)
DEPRECATED KAPPA LC FREE/LAMBDA SER: 0.91 {RATIO} (ref 0.26–1.65)
EOSINOPHIL # BLD: 0.1 K/UL (ref 0–0.7)
EOSINOPHIL NFR BLD: 1.7 %
ERYTHROCYTE [DISTWIDTH] IN BLOOD BY AUTOMATED COUNT: 13.2 % (ref 11.5–14.5)
GLUCOSE SERPL-MCNC: 118 MG/DL (ref 70–99)
HCT VFR BLD AUTO: 36.6 % (ref 37–47)
HGB BLD-MCNC: 12.4 G/DL (ref 12–16)
IGA SERPL-MCNC: 274 MG/DL (ref 68–408)
IGG SERPL-MCNC: 914 MG/DL (ref 768–1632)
IGM SERPL-MCNC: 82 MG/DL (ref 35–263)
KAPPA LC FREE SER-MCNC: 15.46 MG/L (ref 3.3–19.4)
LAMBDA LC FREE SERPL-MCNC: 16.97 MG/L (ref 5.71–26.3)
LYMPHOCYTES # BLD: 1.2 K/UL (ref 1–4.8)
LYMPHOCYTES NFR BLD: 15.7 %
MCH RBC QN AUTO: 31.3 PG (ref 27–31.3)
MCHC RBC AUTO-ENTMCNC: 33.9 % (ref 33–37)
MCV RBC AUTO: 92.4 FL (ref 79.4–94.8)
MONOCYTES # BLD: 0.6 K/UL (ref 0.2–0.8)
MONOCYTES NFR BLD: 7.8 %
NEUTROPHILS # BLD: 5.7 K/UL (ref 1.4–6.5)
NEUTS SEG NFR BLD: 73.9 %
PLATELET # BLD AUTO: 285 K/UL (ref 130–400)
POTASSIUM SERPL-SCNC: 4.4 MEQ/L (ref 3.4–4.9)
RBC # BLD AUTO: 3.96 M/UL (ref 4.2–5.4)
SODIUM SERPL-SCNC: 139 MEQ/L (ref 135–144)
WBC # BLD AUTO: 7.7 K/UL (ref 4.8–10.8)

## 2025-07-02 PROCEDURE — 77012 CT SCAN FOR NEEDLE BIOPSY: CPT | Performed by: RADIOLOGY

## 2025-07-02 PROCEDURE — 99152 MOD SED SAME PHYS/QHP 5/>YRS: CPT | Performed by: RADIOLOGY

## 2025-07-02 PROCEDURE — 2580000003 HC RX 258: Performed by: PAIN MEDICINE

## 2025-07-02 PROCEDURE — 6370000000 HC RX 637 (ALT 250 FOR IP): Performed by: PAIN MEDICINE

## 2025-07-02 PROCEDURE — 88305 TISSUE EXAM BY PATHOLOGIST: CPT

## 2025-07-02 PROCEDURE — 99231 SBSQ HOSP IP/OBS SF/LOW 25: CPT | Performed by: PAIN MEDICINE

## 2025-07-02 PROCEDURE — 2500000003 HC RX 250 WO HCPCS: Performed by: PAIN MEDICINE

## 2025-07-02 PROCEDURE — 2500000003 HC RX 250 WO HCPCS: Performed by: STUDENT IN AN ORGANIZED HEALTH CARE EDUCATION/TRAINING PROGRAM

## 2025-07-02 PROCEDURE — 6370000000 HC RX 637 (ALT 250 FOR IP): Performed by: INTERNAL MEDICINE

## 2025-07-02 PROCEDURE — 80048 BASIC METABOLIC PNL TOTAL CA: CPT

## 2025-07-02 PROCEDURE — 2580000003 HC RX 258: Performed by: RADIOLOGY

## 2025-07-02 PROCEDURE — 6360000002 HC RX W HCPCS: Performed by: STUDENT IN AN ORGANIZED HEALTH CARE EDUCATION/TRAINING PROGRAM

## 2025-07-02 PROCEDURE — 7100000011 HC PHASE II RECOVERY - ADDTL 15 MIN

## 2025-07-02 PROCEDURE — 2709999900 CT BIOPSY SUPERFICIAL BONE PERCUTANEOUS

## 2025-07-02 PROCEDURE — 6360000002 HC RX W HCPCS: Performed by: RADIOLOGY

## 2025-07-02 PROCEDURE — 6360000002 HC RX W HCPCS: Performed by: PAIN MEDICINE

## 2025-07-02 PROCEDURE — 20220 BONE BIOPSY TROCAR/NDL SUPFC: CPT | Performed by: RADIOLOGY

## 2025-07-02 PROCEDURE — 7100000010 HC PHASE II RECOVERY - FIRST 15 MIN

## 2025-07-02 PROCEDURE — 6370000000 HC RX 637 (ALT 250 FOR IP): Performed by: RADIOLOGY

## 2025-07-02 PROCEDURE — 36415 COLL VENOUS BLD VENIPUNCTURE: CPT

## 2025-07-02 PROCEDURE — 3E0R33Z INTRODUCTION OF ANTI-INFLAMMATORY INTO SPINAL CANAL, PERCUTANEOUS APPROACH: ICD-10-PCS | Performed by: PAIN MEDICINE

## 2025-07-02 PROCEDURE — 88185 FLOWCYTOMETRY/TC ADD-ON: CPT

## 2025-07-02 PROCEDURE — 88184 FLOWCYTOMETRY/ TC 1 MARKER: CPT

## 2025-07-02 PROCEDURE — 85025 COMPLETE CBC W/AUTO DIFF WBC: CPT

## 2025-07-02 RX ORDER — DOXYCYCLINE HYCLATE 100 MG
100 TABLET ORAL 2 TIMES DAILY
Qty: 6 TABLET | Refills: 0 | Status: SHIPPED | OUTPATIENT
Start: 2025-07-02 | End: 2025-07-05

## 2025-07-02 RX ORDER — OXYCODONE HYDROCHLORIDE 10 MG/1
10 TABLET ORAL EVERY 4 HOURS PRN
Qty: 42 TABLET | Refills: 0 | Status: SHIPPED | OUTPATIENT
Start: 2025-07-02 | End: 2025-07-09

## 2025-07-02 RX ORDER — PREGABALIN 100 MG/1
100 CAPSULE ORAL 3 TIMES DAILY
Qty: 90 CAPSULE | Refills: 0 | Status: SHIPPED | OUTPATIENT
Start: 2025-07-02 | End: 2025-08-01

## 2025-07-02 RX ORDER — NEOMYCIN/BACITRACIN/POLYMYXINB 3.5-400-5K
OINTMENT (GRAM) TOPICAL PRN
Status: COMPLETED | OUTPATIENT
Start: 2025-07-02 | End: 2025-07-02

## 2025-07-02 RX ORDER — FENTANYL CITRATE 0.05 MG/ML
INJECTION, SOLUTION INTRAMUSCULAR; INTRAVENOUS PRN
Status: COMPLETED | OUTPATIENT
Start: 2025-07-02 | End: 2025-07-02

## 2025-07-02 RX ORDER — CEFUROXIME AXETIL 500 MG/1
500 TABLET ORAL 2 TIMES DAILY
Qty: 6 TABLET | Refills: 0 | Status: SHIPPED | OUTPATIENT
Start: 2025-07-02 | End: 2025-07-05

## 2025-07-02 RX ORDER — SODIUM CHLORIDE 9 MG/ML
INJECTION, SOLUTION INTRAVENOUS CONTINUOUS PRN
Status: COMPLETED | OUTPATIENT
Start: 2025-07-02 | End: 2025-07-02

## 2025-07-02 RX ORDER — LIDOCAINE HYDROCHLORIDE 20 MG/ML
INJECTION, SOLUTION INFILTRATION; PERINEURAL PRN
Status: COMPLETED | OUTPATIENT
Start: 2025-07-02 | End: 2025-07-02

## 2025-07-02 RX ORDER — MIDAZOLAM HYDROCHLORIDE 2 MG/2ML
INJECTION, SOLUTION INTRAMUSCULAR; INTRAVENOUS PRN
Status: COMPLETED | OUTPATIENT
Start: 2025-07-02 | End: 2025-07-02

## 2025-07-02 RX ADMIN — OXYCODONE 5 MG: 5 TABLET ORAL at 08:15

## 2025-07-02 RX ADMIN — PREGABALIN 100 MG: 50 CAPSULE ORAL at 13:21

## 2025-07-02 RX ADMIN — BACITRACIN, NEOMYCIN, POLYMYXIN B 1 EACH: 400; 3.5; 5 OINTMENT TOPICAL at 12:24

## 2025-07-02 RX ADMIN — SODIUM CHLORIDE 50 ML/HR: 0.9 INJECTION, SOLUTION INTRAVENOUS at 12:05

## 2025-07-02 RX ADMIN — DOXYCYCLINE 100 MG: 100 INJECTION, POWDER, LYOPHILIZED, FOR SOLUTION INTRAVENOUS at 08:52

## 2025-07-02 RX ADMIN — WATER 1000 MG: 1 INJECTION INTRAMUSCULAR; INTRAVENOUS; SUBCUTANEOUS at 13:23

## 2025-07-02 RX ADMIN — OXYCODONE 10 MG: 5 TABLET ORAL at 01:50

## 2025-07-02 RX ADMIN — MIDAZOLAM HYDROCHLORIDE 1 MG: 1 INJECTION, SOLUTION INTRAMUSCULAR; INTRAVENOUS at 12:07

## 2025-07-02 RX ADMIN — SODIUM CHLORIDE, PRESERVATIVE FREE 10 ML: 5 INJECTION INTRAVENOUS at 08:15

## 2025-07-02 RX ADMIN — Medication 1 TABLET: at 08:15

## 2025-07-02 RX ADMIN — LIDOCAINE HYDROCHLORIDE 7 ML: 20 INJECTION, SOLUTION INFILTRATION; PERINEURAL at 12:17

## 2025-07-02 RX ADMIN — OXYCODONE 10 MG: 5 TABLET ORAL at 13:21

## 2025-07-02 RX ADMIN — FENTANYL CITRATE 100 MCG: 50 INJECTION INTRAMUSCULAR; INTRAVENOUS at 12:08

## 2025-07-02 RX ADMIN — PREGABALIN 100 MG: 50 CAPSULE ORAL at 08:15

## 2025-07-02 ASSESSMENT — PAIN DESCRIPTION - DESCRIPTORS
DESCRIPTORS: NUMBNESS
DESCRIPTORS: NUMBNESS

## 2025-07-02 ASSESSMENT — PAIN SCALES - GENERAL
PAINLEVEL_OUTOF10: 7
PAINLEVEL_OUTOF10: 4
PAINLEVEL_OUTOF10: 0
PAINLEVEL_OUTOF10: 5
PAINLEVEL_OUTOF10: 5
PAINLEVEL_OUTOF10: 7

## 2025-07-02 ASSESSMENT — PAIN DESCRIPTION - LOCATION
LOCATION: BACK;SACRUM
LOCATION: BUTTOCKS
LOCATION: BUTTOCKS

## 2025-07-02 ASSESSMENT — PAIN DESCRIPTION - ORIENTATION
ORIENTATION: RIGHT
ORIENTATION: RIGHT

## 2025-07-02 NOTE — FLOWSHEET NOTE
Pt arrived to CT holding via bed from inpt room. Pt already changed into gown.  Pt hooked up to vitals sign machine. VSS.  Consents reviewed with patient and signed. Pt resting in bed in stable condition.     Confirmed NPO status. PT ate last at 2100 yesterday.      Iv established to R AC as medial existing R AV IV not functioning (leaking, swollen, and painful) .     Dr. Carolina and Nasima Vargas CNP in to see and speak with pt. Electronically signed by Kita White RN on 7/2/2025 at 11:38 AM     Pt taken to ct room 2 for procedure by MALGORZATA GAMA .Electronically signed by Kita White RN on 7/2/2025 at 11:54 AM

## 2025-07-02 NOTE — PROGRESS NOTES
CLINICAL PHARMACY NOTE: MEDS TO BEDS    Total # of Prescriptions Filled: 3   The following medications were delivered to the patient:  Pregabalin 100 mg Cap  Cefuroxime 500 mg Tab  Doxycycyline Hycla 100 mg Tab    Additional Documentation:

## 2025-07-02 NOTE — PRE SEDATION
Sedation Pre-Procedure Note    Patient Name: Mari Goodrich   YOB: 1955  Room/Bed: [facility]/W481-01  Medical Record Number: [id number]  Date: 7/2/2025   Time: 11:37 AM       Indication:  Sacral mass biopsy    Consent: I have discussed with the patient and/or the patient representative the indication, alternatives, and the possible risks and/or complications of the planned procedure and the anesthesia methods. The patient and/or patient representative appear to understand and agree to proceed.    Vital Signs:   Vitals:    07/02/25 0726   BP: 117/62   Pulse: 80   Resp: 16   Temp: 98.2 °F (36.8 °C)   SpO2: 95%       Past Medical History:   has a past medical history of Asthma, Breast cancer (HCC), Diabetes mellitus (HCC), History of therapeutic radiation, Hx antineoplastic chemo, Hypertension, and Obesity.    Past Surgical History:   has a past surgical history that includes Breast biopsy; Breast lumpectomy; Breast reduction surgery; Hysterectomy; Hysterectomy, total abdominal; and Pain management procedure (Right, 6/27/2025).    Medications:   Scheduled Meds:    cefTRIAXone (ROCEPHIN) IV  1,000 mg IntraVENous Q24H    guaiFENesin  200 mg Oral Q4H While awake    pregabalin  100 mg Oral TID    doxycycline (VIBRAMYCIN) IV  100 mg IntraVENous Q12H    sodium chloride flush  5-40 mL IntraVENous 2 times per day    [Held by provider] enoxaparin  40 mg SubCUTAneous Daily    losartan  100 mg Oral Daily    magnesium oxide  400 mg Oral Daily    therapeutic multivitamin-minerals  1 tablet Oral Daily     Continuous Infusions:    sodium chloride       PRN Meds: sodium chloride flush, sodium chloride, potassium chloride **OR** potassium alternative oral replacement **OR** potassium chloride, magnesium sulfate, ondansetron **OR** ondansetron, polyethylene glycol, acetaminophen **OR** acetaminophen, oxyCODONE **OR** oxyCODONE, [Held by provider] ibuprofen, meclizine  Home Meds:   Prior to Admission medications    Medication Sig  sedation    Medications Planned:   midazolam (Versed) intravenously and fentanyl intravenously    Patient is an appropriate candidate for plan of sedation: yes    Electronically signed by ISSAC Aranda CNP on 7/2/2025 at 11:37 AM

## 2025-07-02 NOTE — OR NURSING
SEDATION ADMINISTERED    Patient positioned prone on CT table.  Vitals Monitor applied.  VSS.  CT scans done.    1207  Timeout completed for image guided sacral mass biopsy with conscious sedation.  Versed 1 mg IV and Fentanyl 100 mcg IV sedation administered by Maya-NATAN, independent observer.     Dr Carolina reviewed scans, right buttock site marked, prepped with Chloraprep. After 3 minute dry time, draped with sterile drape and towels.      1217 Skin numbed with Lidocaine 2% by Dr Carolina.  CT Fluoro guidance used to place 17 g x 11.1 cm introducer.    1222 core biopsy needle 18 g x 16cm used to obtain a total of 2 samples.  Samples placed into RPMI tube and formulin cup.  Verbal and tactile reassurance provided throughout.    1224 Introducer withdrawn, pressure held then neosporin and bandaid applied.  Patient tolerated well.     Specimen taken to lab.  Patient taken to CT Holding Room for Recovery.    Total Sedation Given:  Versed:     1mg       Fentanyl:    100mcg

## 2025-07-02 NOTE — FLOWSHEET NOTE
9914 spoke with Sandy GAMA, aware pt is on IR schedule today  @ 1300 for CT guided sacral mass bx.   Report received.

## 2025-07-02 NOTE — DISCHARGE SUMMARY
Hospital Medicine Discharge Summary    Mari Goodrich  :  1955  MRN:  [id number]    Admit date:  2025  Discharge date:  2025    Admitting Physician:  Safia Caba MD  Primary Care Physician:  Elvis Moore MD      Discharge Diagnoses:        Intractable back pain with lumbar radiculopathy due to underlying sacral mass-likely malignant  Possible left lower lobe pneumonia-though lesions cannot be ruled out  HTN  History of breast cancer-in the     Chief Complaint   Patient presents with    Back Pain     Hospital Course:     Patient was admitted with intractable back pain with lower colopathy.  She was found to have underlying sacral mass that likely malignant.  She was also found to have left lower lobe lung lesion.  She was seen by oncology.  She underwent sacral mass biopsy.  Result is pending at the time of this note.  She will need to follow-up with oncology as outpatient for results.  She was treated for possible pneumonia also.  She was discharged on p.o. antibiotics.  She will follow-up with PCP as outpatient.  She understand that her presentation is concerning for malignancy/cancer.  This was explained to her & language.  She verbalized understanding.    Exam on discharge:   /62   Pulse 79   Temp 98.2 °F (36.8 °C) (Oral)   Resp 15   Ht 1.575 m (5' 2\")   Wt 86.2 kg (190 lb)   SpO2 94%   BMI 34.75 kg/m²   General appearance: No apparent distress, appears stated age and cooperative.  HEENT: Pupils equal, round, and reactive to light. Conjunctivae/corneas clear.  Neck: Supple, with full range of motion. No jugular venous distention. Trachea midline.  Respiratory:  Normal respiratory effort. Clear to auscultation, bilaterally without Rales/Wheezes/Rhonchi.  Cardiovascular: Regular rate and rhythm with normal S1/S2 without murmurs, rubs or gallops.  Abdomen: Soft, non-tender, non-distended with normal bowel sounds.  Musculoskeletal: No clubbing, cyanosis or edema bilaterally.   arthropathy, and thickening ligamentum flavum. Mild central canal narrowing. Mild-to-moderate right neural foraminal narrowing. Mild left neural foraminal narrowing. There is a left foraminal disc herniation. L2-L3: Disc bulge with osteophytosis, facet arthropathy, and thickening of the ligamentum flavum. Moderate central canal narrowing. Mild-to-moderate bilateral neural foraminal narrowing. L3-L4: Disc bulge with osteophytosis, facet arthropathy, and thickening of the ligamentum flavum. Moderate-to-severe central canal narrowing. Mild-to-moderate bilateral neural foraminal narrowing. L4-L5: Disc bulge with osteophytosis, facet arthropathy, and thickening of the ligamentum flavum. Mild central disc herniation with T2 hyperintensity suggesting annular fissure. Mild central canal narrowing, asymmetrically greater towards the left side. Mild-to-moderate bilateral neural foraminal narrowing. L5-S1: Disc bulge with osteophytosis and facet arthropathy. Mild-to-moderate right neural foraminal narrowing. Mild left neural foraminal narrowing.     1. Destructive mass lesion centered at the right sacrum, extending into the sacral canal, involving the right S1-2 and S2-3 neural foramina, with extraosseous extension into adjacent soft tissues. Scattered additional osseous enhancing lesions in the spine. Findings most concerning for neoplastic process such as metastatic disease  myeloma, lymphoma, or other. 2. Degenerative disease in the spine.     FLUORO FOR SURGICAL PROCEDURES  Result Date: 6/27/2025  EXAMINATION: SPOT FLUOROSCOPIC IMAGES 6/27/2025 3:54 pm TECHNIQUE: Fluoroscopy was provided by the radiology department for procedure. Radiologist was not present during examination. FLUOROSCOPY DOSE AND TYPE: Radiation Exposure Index: Kerma mGy, 4.76 COMPARISON: None HISTORY: ORDERING SYSTEM PROVIDED HISTORY: pain TECHNOLOGIST PROVIDED HISTORY: Reason for exam:->pain What reading provider will be dictating this exam?->CRC

## 2025-07-02 NOTE — DISCHARGE INSTRUCTIONS
BIOPSY DISCHARGE INSTRUCTIONS    ACTIVITY:   Rest today. Expect to be sore for 1 to 2 days. No heavy bending, lifting , stretching, pushing or pulling for at least 24 hours. Do not lift anything over 10 pounds for the next 24 - 48 hours. Do not drive today.      BATHING:   May shower, bathe, or swim after 24 hours.  Pat the site dry. May remove large band aid after 24 hours.    DIET:   May resume your normal diet.     MEDICATION:  * Resume home medication unless otherwise instructed by your physician.  * (If Applicable) If taking any blood thinners or Aspirin, hold for 24 hours after biopsy.  * May take mild pain reliever, as needed, such as Acetaminophen or Ibuprofen.      COMPLICATIONS RELATED TO BIOPSY:   - Dizziness, weakness, fatigue  - Bruising/firmness/ and/or pain at the site.  - Extreme pain after leaving the hospital.   - Large or heavy bleeding at biopsy site.   IF THESE SYMPTOMS OCCUR AND BECOME SEVERE, REPORT TO THE EMERGENCY ROOM FOR FURTHER EVALUATION.     For the next 48 hours watch site for redness, drainage, swelling , fever (temp above 101 degrees F), or chills.   If these signs of infection occur contact DR. GUNN at [phone].

## 2025-07-02 NOTE — CARE COORDINATION
MET WITH PATIENT AT BEDSIDE TO DISCUSS DISCHARGE PLAN.  AT BEDSIDE. PATIENT DENIES HOME GOING NEEDS. DISCHARGE PLAN HOME NO NEEDS.

## 2025-07-02 NOTE — PROGRESS NOTES
Department of Chronic Pain Managment  Attending Progress Note      SUBJECTIVE  Low back pain    OBJECTIVE: Patient here today for initial valuation.  She has pain in her right side of her low back down her leg.  It has been going on for 6 months.  No back surgery she is not diabetic she is not on blood thinners currently does not work at physical jobs on her feet in the past.  Standing is best.  Although sitting standing bending laying down also bothers does not smoke.  She does have a CT scan ordered by Dr. Rausch  she has significant stenosis L3-4 along with lytic lesion in the right aspect of the sacrum. Tylenol Motrin do not help for pain.  Gabapentin makes her sleepy. Was started on 3 norcos a day but she got admitted through ED for severe pain on percocet about 10mg QID and Lyrica with incomplete but reasonable relief of pain will follow as needed. Discharge her on same dose.    Medications  Current Facility-Administered Medications: cefTRIAXone (ROCEPHIN) 1,000 mg in sterile water 10 mL IV syringe, 1,000 mg, IntraVENous, Q24H  guaiFENesin (ROBITUSSIN) 100 MG/5ML liquid 200 mg, 200 mg, Oral, Q4H While awake  pregabalin (LYRICA) capsule 100 mg, 100 mg, Oral, TID  doxycycline (VIBRAMYCIN) 100 mg in sodium chloride 0.9 % 100 mL IVPB, 100 mg, IntraVENous, Q12H  sodium chloride flush 0.9 % injection 5-40 mL, 5-40 mL, IntraVENous, 2 times per day  sodium chloride flush 0.9 % injection 5-40 mL, 5-40 mL, IntraVENous, PRN  0.9 % sodium chloride infusion, , IntraVENous, PRN  potassium chloride (KLOR-CON M) extended release tablet 40 mEq, 40 mEq, Oral, PRN **OR** potassium bicarb-citric acid (EFFER-K) effervescent tablet 40 mEq, 40 mEq, Oral, PRN **OR** potassium chloride 10 mEq/100 mL IVPB (Peripheral Line), 10 mEq, IntraVENous, PRN  magnesium sulfate 2000 mg in 50 mL IVPB premix, 2,000 mg, IntraVENous, PRN  [Held by provider] enoxaparin (LOVENOX) injection 40 mg, 40 mg, SubCUTAneous, Daily  ondansetron (ZOFRAN-ODT)

## 2025-07-02 NOTE — FLOWSHEET NOTE
Pt returned to ct holding post procedure. Pt states her pain is a 7/10 to right lower buttock where mass is location - numb in nature. Pt assisted onto left side for comfort. Pts band aid is clean, dry, intact.     Pt provided coffee and water and tolerated well.     Report called to Sandy GAMA by NAHUN RN. PT taken by transporter back to inpt room. Electronically signed by Kita White RN on 7/2/2025 at 12:59 PM

## 2025-07-03 ENCOUNTER — TELEPHONE (OUTPATIENT)
Dept: FAMILY MEDICINE CLINIC | Age: 70
End: 2025-07-03

## 2025-07-03 ENCOUNTER — TELEPHONE (OUTPATIENT)
Age: 70
End: 2025-07-03

## 2025-07-03 ENCOUNTER — TELEPHONE (OUTPATIENT)
Dept: INTERVENTIONAL RADIOLOGY/VASCULAR | Age: 70
End: 2025-07-03

## 2025-07-03 NOTE — TELEPHONE ENCOUNTER
Care Transitions Initial Follow Up Call    Outreach made within 2 business days of discharge: Yes    Patient: Mari Goodrich Patient : 1955   MRN: [id number]  Reason for Admission: Intractable low back pain   Discharge Date: 25       Spoke with: PT     Discharge department/facility: [facility]    TCM Interactive Patient Contact:  Was patient able to fill all prescriptions: Yes  Was patient instructed to bring all medications to the follow-up visit: Yes  Is patient taking all medications as directed in the discharge summary? Yes  Does patient understand their discharge instructions: Yes  Does patient have questions or concerns that need addressed prior to 7-14 day follow up office visit: no    Additional needs identified to be addressed with provider  No needs identified             Scheduled appointment with PCP within 7-14 days    Follow Up  Future Appointments   Date Time Provider Department Center   2025 10:45 AM Elvis Moore MD [facility]   2025 11:00 AM Francy Martin PA-C [facility]   2025  8:30 AM Jaleel Yi DO [facility], [place]

## 2025-07-03 NOTE — TELEPHONE ENCOUNTER
Post procedure phone call placed. This patient had a sacral biopsy done by Dr. Carolina on 07/02/2025. Spoke to the patient who denies any issues,problems,complications overnight due to the sacral biopsy.

## 2025-07-03 NOTE — PROCEDURES
PROCEDURE NOTE  Date: 7/3/2025   Date of procedure 6/27/2025  Name: Mari Goodrich  YOB: 1955    Procedures        Date of Procedure: 6/27/2025     Pre-Op Diagnosis Codes:      * Intractable low back pain [M54.59]      Lumbar radiculopathy     Post-Op Diagnosis: Lumbar radiculopathy.       Procedure(s):  Right sided Trans foraminal Epidural Steroid injection. ROOM 481     Surgeon(s):  Sawyer Lyons MD     Assistant:  * No surgical staff found *     Anesthesia: Local     Estimated Blood Loss (mL): Minimal     Complications: None     Specimens:   * No specimens in log *     Implants:  * No implants in log *      Drains: * No LDAs found *     Findings:  Infection Present At Time Of Surgery (PATOS) (choose all levels that have infection present):  No infection present  Other Findings: None     Right L3-4 Trans foraminal Epidural Steroid injection:.   Discussed procedure with Pt, adressed risks and concerns, informed consent obtained.  Prone pojistion on Vianca table, right oblique used to renetta skin for needle placement, Skin prepped and draped with betadine, . 23G needle advanced to Right L3-4 foramen final pojistion confirmed with oblique and AP View injection of 0.5cc omnipaque injection showed good epidural spread. injected 40mg Kenalog and 2cc 0.25% Bupivacaine at this spot with good relief, skin washed with Wet towel, Pt discharged to floor in stable condition.

## 2025-07-04 LAB
ACUTE LEUKEMIA MARKERS SPEC-IMP: NORMAL
EVENTS COUNTED SPEC: 22 MARKERS
PROF LEUK/LYMPH PHENO 16 OR MORE MARKERS: NORMAL
SOURCE: NORMAL
TECHNICAL LEUK/LYMPH PHENO, 22 MARKERS: NORMAL

## 2025-07-07 ENCOUNTER — OFFICE VISIT (OUTPATIENT)
Dept: FAMILY MEDICINE CLINIC | Age: 70
End: 2025-07-07
Payer: COMMERCIAL

## 2025-07-07 ENCOUNTER — HOSPITAL ENCOUNTER (EMERGENCY)
Age: 70
Discharge: HOME | End: 2025-07-07
Attending: EMERGENCY MEDICINE
Payer: COMMERCIAL

## 2025-07-07 VITALS
TEMPERATURE: 97.2 F | HEIGHT: 62 IN | SYSTOLIC BLOOD PRESSURE: 120 MMHG | WEIGHT: 188.8 LBS | HEART RATE: 85 BPM | BODY MASS INDEX: 34.74 KG/M2 | DIASTOLIC BLOOD PRESSURE: 70 MMHG | OXYGEN SATURATION: 95 %

## 2025-07-07 VITALS
OXYGEN SATURATION: 96 % | SYSTOLIC BLOOD PRESSURE: 106 MMHG | WEIGHT: 183 LBS | DIASTOLIC BLOOD PRESSURE: 57 MMHG | TEMPERATURE: 98.2 F | HEIGHT: 62 IN | HEART RATE: 84 BPM | BODY MASS INDEX: 33.68 KG/M2 | RESPIRATION RATE: 16 BRPM

## 2025-07-07 DIAGNOSIS — Z09 HOSPITAL DISCHARGE FOLLOW-UP: Primary | ICD-10-CM

## 2025-07-07 DIAGNOSIS — M89.8X8 MASS OF SPINE: ICD-10-CM

## 2025-07-07 DIAGNOSIS — K59.00 CONSTIPATION, UNSPECIFIED CONSTIPATION TYPE: ICD-10-CM

## 2025-07-07 DIAGNOSIS — M54.50 ACUTE EXACERBATION OF CHRONIC LOW BACK PAIN: Primary | ICD-10-CM

## 2025-07-07 DIAGNOSIS — G89.29 ACUTE EXACERBATION OF CHRONIC LOW BACK PAIN: Primary | ICD-10-CM

## 2025-07-07 PROCEDURE — 96372 THER/PROPH/DIAG INJ SC/IM: CPT

## 2025-07-07 PROCEDURE — 99284 EMERGENCY DEPT VISIT MOD MDM: CPT

## 2025-07-07 PROCEDURE — 1159F MED LIST DOCD IN RCRD: CPT | Performed by: FAMILY MEDICINE

## 2025-07-07 PROCEDURE — 2500000003 HC RX 250 WO HCPCS: Performed by: EMERGENCY MEDICINE

## 2025-07-07 PROCEDURE — 3074F SYST BP LT 130 MM HG: CPT | Performed by: FAMILY MEDICINE

## 2025-07-07 PROCEDURE — 6370000000 HC RX 637 (ALT 250 FOR IP): Performed by: EMERGENCY MEDICINE

## 2025-07-07 PROCEDURE — 99214 OFFICE O/P EST MOD 30 MIN: CPT | Performed by: FAMILY MEDICINE

## 2025-07-07 PROCEDURE — 1123F ACP DISCUSS/DSCN MKR DOCD: CPT | Performed by: FAMILY MEDICINE

## 2025-07-07 PROCEDURE — 3078F DIAST BP <80 MM HG: CPT | Performed by: FAMILY MEDICINE

## 2025-07-07 PROCEDURE — 1111F DSCHRG MED/CURRENT MED MERGE: CPT | Performed by: FAMILY MEDICINE

## 2025-07-07 RX ORDER — ONDANSETRON 4 MG/1
4 TABLET, ORALLY DISINTEGRATING ORAL ONCE
Status: COMPLETED | OUTPATIENT
Start: 2025-07-07 | End: 2025-07-07

## 2025-07-07 RX ORDER — KETOROLAC TROMETHAMINE 10 MG/1
10 TABLET, FILM COATED ORAL EVERY 6 HOURS PRN
Qty: 20 TABLET | Refills: 0 | Status: SHIPPED | OUTPATIENT
Start: 2025-07-07

## 2025-07-07 RX ORDER — HYDROMORPHONE HYDROCHLORIDE 1 MG/ML
1 INJECTION, SOLUTION INTRAMUSCULAR; INTRAVENOUS; SUBCUTANEOUS ONCE
Refills: 0 | Status: COMPLETED | OUTPATIENT
Start: 2025-07-07 | End: 2025-07-07

## 2025-07-07 RX ORDER — CYCLOBENZAPRINE HCL 10 MG
10 TABLET ORAL 3 TIMES DAILY PRN
Qty: 30 TABLET | Refills: 0 | Status: SHIPPED | OUTPATIENT
Start: 2025-07-07 | End: 2025-07-17

## 2025-07-07 RX ADMIN — ONDANSETRON 4 MG: 4 TABLET, ORALLY DISINTEGRATING ORAL at 01:10

## 2025-07-07 RX ADMIN — HYDROMORPHONE HYDROCHLORIDE 1 MG: 1 INJECTION, SOLUTION INTRAMUSCULAR; INTRAVENOUS; SUBCUTANEOUS at 01:10

## 2025-07-07 ASSESSMENT — ENCOUNTER SYMPTOMS
APNEA: 0
PHOTOPHOBIA: 0
SINUS PRESSURE: 0
DIARRHEA: 0
SHORTNESS OF BREATH: 0
ABDOMINAL PAIN: 0
NAUSEA: 0
RHINORRHEA: 0
WHEEZING: 0
EYE PAIN: 0
VOMITING: 0
ABDOMINAL DISTENTION: 0
CONSTIPATION: 0
BACK PAIN: 1
COUGH: 0
SORE THROAT: 0
COLOR CHANGE: 0

## 2025-07-07 ASSESSMENT — PAIN DESCRIPTION - LOCATION: LOCATION: BACK;LEG

## 2025-07-07 ASSESSMENT — PAIN DESCRIPTION - FREQUENCY: FREQUENCY: CONTINUOUS

## 2025-07-07 ASSESSMENT — PAIN SCALES - GENERAL: PAINLEVEL_OUTOF10: 10

## 2025-07-07 ASSESSMENT — PAIN DESCRIPTION - ORIENTATION: ORIENTATION: LOWER

## 2025-07-07 ASSESSMENT — PAIN - FUNCTIONAL ASSESSMENT: PAIN_FUNCTIONAL_ASSESSMENT: 0-10

## 2025-07-07 ASSESSMENT — PAIN DESCRIPTION - PAIN TYPE: TYPE: ACUTE PAIN

## 2025-07-07 NOTE — PROGRESS NOTES
[facility] PR[facility]  [address] [place] [id number]  Dept: [phone]  Dept Fax: [phone]     Chief Complaint:  Chief Complaint   Patient presents with    Follow-Up from Hospital     Patient was in [facility] on 6-27 for lower back pain and this morning was in [facility] due to her pain being intolerable        Vitals:    07/07/25 1049   BP: 120/70   BP Site: Right Upper Arm   Patient Position: Sitting   BP Cuff Size: Medium Adult   Pulse: 85   Temp: 97.2 °F (36.2 °C)   TempSrc: Infrared   SpO2: 95%   Weight: 85.6 kg (188 lb 12.8 oz)   Height: 1.575 m (5' 2\")       HPI:  70 y.o.female who presents for the following:      Hosp f/u: admitted for severe back pain; imaging revealed suspicious sacral/L2 spine mass and possible LLL mass vs pneumonia; treated empirically for pneumonia; biopsy performed and connected with oncology for OP f/u; seen in the ED this morning for back pain and given dilaudid during visit and toradol/flexeril for home; was given oxycodone from pain clinic; was more active than usual yesterday which may have flared up her lumbar DDD; last BM 5 days ago; started otc stool softener    Admit date:  6/27/2025                        Discharge date:  7/2/2025   Hospital Course:      Patient was admitted with intractable back pain with lower colopathy.  She was found to have underlying sacral mass that likely malignant.  She was also found to have left lower lobe lung lesion.  She was seen by oncology.  She underwent sacral mass biopsy.  Result is pending at the time of this note.  She will need to follow-up with oncology as outpatient for results.  She was treated for possible pneumonia also.  She was discharged on p.o. antibiotics.  She will follow-up with PCP as outpatient.  She understand that her presentation is concerning for malignancy/cancer.     -----------------------------------------------------------------------------    Assessment/Plan:  70

## 2025-07-07 NOTE — ED PROVIDER NOTES
normal. There is no distension.      Palpations: Abdomen is soft. There is no mass.      Tenderness: There is no abdominal tenderness. There is no right CVA tenderness, left CVA tenderness, guarding or rebound.      Hernia: No hernia is present.   Musculoskeletal:         General: No swelling, tenderness, deformity or signs of injury. Normal range of motion.      Cervical back: Normal range of motion and neck supple. No rigidity or tenderness.      Right lower leg: No edema.      Left lower leg: No edema.   Lymphadenopathy:      Cervical: No cervical adenopathy.   Skin:     General: Skin is warm and dry.      Capillary Refill: Capillary refill takes less than 2 seconds.      Coloration: Skin is not jaundiced or pale.      Findings: No bruising, erythema, lesion or rash.      Comments: Dressing intact to right sacral biopsy wound.   Neurological:      General: No focal deficit present.      Mental Status: She is alert and oriented to person, place, and time. Mental status is at baseline.      Cranial Nerves: No cranial nerve deficit.      Sensory: No sensory deficit.      Motor: No weakness or abnormal muscle tone.      Coordination: Coordination normal.      Gait: Gait normal.      Deep Tendon Reflexes: Reflexes are normal and symmetric. Reflexes normal.   Psychiatric:         Mood and Affect: Mood normal.         Behavior: Behavior normal.         Thought Content: Thought content normal.         Judgment: Judgment normal.         DIAGNOSTIC RESULTS     EKG: All EKG's are interpreted by the Emergency Department Physician who either signs or Co-signs this chart in the absence of a cardiologist.        RADIOLOGY:   Non-plain film images such as CT, Ultrasound and MRI are read by the radiologist. Plain radiographicimages are visualized and preliminarily interpreted by the emergency physician with the below findings:        Interpretation per the Radiologist below, if available at the time of this note:    No orders to

## 2025-07-21 ENCOUNTER — TELEPHONE (OUTPATIENT)
Age: 70
End: 2025-07-21

## 2025-07-21 NOTE — TELEPHONE ENCOUNTER
Per Nasima Vargas, AUGIE - cancel appointment scheduled with Francy on 7/23/2025.  She will need to have a follow up appointment with Dr. Loera to decide what the next steps will be.    Spoke to patient's , Scotty - he will advise patient and contact Dr. Loera's office

## 2025-07-24 ENCOUNTER — OFFICE VISIT (OUTPATIENT)
Age: 70
End: 2025-07-24
Payer: COMMERCIAL

## 2025-07-24 ENCOUNTER — TELEPHONE (OUTPATIENT)
Age: 70
End: 2025-07-24

## 2025-07-24 VITALS
WEIGHT: 181 LBS | SYSTOLIC BLOOD PRESSURE: 160 MMHG | OXYGEN SATURATION: 99 % | DIASTOLIC BLOOD PRESSURE: 100 MMHG | HEIGHT: 62 IN | BODY MASS INDEX: 33.31 KG/M2 | HEART RATE: 77 BPM

## 2025-07-24 DIAGNOSIS — M54.59 INTRACTABLE LOW BACK PAIN: ICD-10-CM

## 2025-07-24 DIAGNOSIS — M53.3 SACRAL MASS: ICD-10-CM

## 2025-07-24 DIAGNOSIS — G89.4 CHRONIC PAIN SYNDROME: Primary | ICD-10-CM

## 2025-07-24 DIAGNOSIS — M54.16 LUMBAR RADICULITIS: ICD-10-CM

## 2025-07-24 PROCEDURE — 1125F AMNT PAIN NOTED PAIN PRSNT: CPT | Performed by: PAIN MEDICINE

## 2025-07-24 PROCEDURE — G2211 COMPLEX E/M VISIT ADD ON: HCPCS | Performed by: PAIN MEDICINE

## 2025-07-24 PROCEDURE — 1159F MED LIST DOCD IN RCRD: CPT | Performed by: PAIN MEDICINE

## 2025-07-24 PROCEDURE — 1123F ACP DISCUSS/DSCN MKR DOCD: CPT | Performed by: PAIN MEDICINE

## 2025-07-24 PROCEDURE — 3077F SYST BP >= 140 MM HG: CPT | Performed by: PAIN MEDICINE

## 2025-07-24 PROCEDURE — 99214 OFFICE O/P EST MOD 30 MIN: CPT | Performed by: PAIN MEDICINE

## 2025-07-24 PROCEDURE — 3080F DIAST BP >= 90 MM HG: CPT | Performed by: PAIN MEDICINE

## 2025-07-24 RX ORDER — PREGABALIN 100 MG/1
100 CAPSULE ORAL 3 TIMES DAILY
Qty: 90 CAPSULE | Refills: 0 | Status: SHIPPED | OUTPATIENT
Start: 2025-07-24 | End: 2025-08-23

## 2025-07-24 RX ORDER — CYCLOBENZAPRINE HCL 10 MG
10 TABLET ORAL 3 TIMES DAILY PRN
COMMUNITY

## 2025-07-24 NOTE — TELEPHONE ENCOUNTER
Per CoverMyMeds.com, Pregabalin (Lyrica) 100 mg capsule approved.     Approved today by [employer] NCPDP 2017  Your request has been approved  Effective Date: 4/25/2025  Authorization Expiration Date: 7/24/2026

## 2025-07-24 NOTE — TELEPHONE ENCOUNTER
Pregabalin (Lyrica) 100 mg capsule prior authorization request submitted online ([employer] to [employer] Part D), auth pending.     (Key: [id number])  PA Case ID #: [id number]  Rx #: [id number]

## 2025-07-24 NOTE — PROGRESS NOTES
[facility]  [facility]  P: [phone]  F: [phone]        Mari Goodrich  (1955)    7/24/2025    Subjective:     Mari Goodrich is 70 y.o. female who complains today of:     Chief Complaint   Patient presents with    Follow-up     reassess pain and review meds     Patient here today for follow-up.  Patient has chronic pain.  Patient is tolerating the pain medications without difficulty.  The pain affects patient's quality life.  Pain usually worse with activity bending twisting better with rest.  Sleep can be impaired.  Pain can be achy sharp, sometimes with spasms.  It varies in intensity.  NRS pain level with and without the medication respectively is 7 and 10.  She will have to see Dr. Loera from oncology.  She likely has leukemia or lymphoma.  Lyrica is helping with the pain.  Toradol was helping as well.  Some pain down the right leg into the thigh foot is better.  I reviewed the MRI from end of June.  She is not taking oxycodone or Norco.    Assessment: Chronic pain syndrome, sacral mass likely cancer lumbar spondylosis    Plan: We discussed treatment options in detail today. I am the primary provider for this patient's complex chronic pain condition that requires ongoing medical management.  The nature of this condition and indicated treatment requires a longitudinal relationship and continual monitoring over time for appropriate safety and response.  Shared goals were created and discussed including a reduction in pain intensity and improvement in ability to complete activities of daily living.   Pain generators reviewed.  Anatomic model of pathology reviewed.  OARRS was reviewed.  Narcotic drug policy reviewed in detail.  Risks and benefits with narcotics reviewed.  Continue current pain medications for chronic pain.  No side effects or aberrant behaviors noted.  The pain medications help with quality of life activities of daily living

## 2025-07-28 ENCOUNTER — HOSPITAL ENCOUNTER (EMERGENCY)
Age: 70
Discharge: HOME | DRG: 166 | End: 2025-07-29
Attending: INTERNAL MEDICINE
Payer: COMMERCIAL

## 2025-07-28 ENCOUNTER — APPOINTMENT (OUTPATIENT)
Dept: CT IMAGING | Age: 70
DRG: 166 | End: 2025-07-28
Payer: COMMERCIAL

## 2025-07-28 DIAGNOSIS — M84.58XA PATHOLOGICAL FRACTURE OF VERTEBRA DUE TO MALIGNANT NEOPLASM METASTATIC TO BONE (HCC): ICD-10-CM

## 2025-07-28 DIAGNOSIS — C79.51 PATHOLOGICAL FRACTURE OF VERTEBRA DUE TO MALIGNANT NEOPLASM METASTATIC TO BONE (HCC): ICD-10-CM

## 2025-07-28 DIAGNOSIS — J18.9 PNEUMONIA DUE TO INFECTIOUS ORGANISM, UNSPECIFIED LATERALITY, UNSPECIFIED PART OF LUNG: Primary | ICD-10-CM

## 2025-07-28 LAB
ALBUMIN SERPL-MCNC: 4.8 G/DL (ref 3.5–4.6)
ALP SERPL-CCNC: 154 U/L (ref 40–130)
ALT SERPL-CCNC: 15 U/L (ref 0–33)
ANION GAP SERPL CALCULATED.3IONS-SCNC: 15 MEQ/L (ref 9–15)
AST SERPL-CCNC: 21 U/L (ref 0–35)
BACTERIA URNS QL MICRO: NEGATIVE /HPF
BASOPHILS # BLD: 0 K/UL (ref 0–0.2)
BASOPHILS NFR BLD: 0.4 %
BILIRUB SERPL-MCNC: 0.3 MG/DL (ref 0.2–0.7)
BILIRUB UR QL STRIP: NEGATIVE
BNP BLD-MCNC: <36 PG/ML
BUN SERPL-MCNC: 17 MG/DL (ref 8–23)
CALCIUM SERPL-MCNC: 10.5 MG/DL (ref 8.5–9.9)
CHLORIDE SERPL-SCNC: 101 MEQ/L (ref 95–107)
CLARITY UR: CLEAR
CO2 SERPL-SCNC: 22 MEQ/L (ref 20–31)
COLOR UR: YELLOW
CREAT SERPL-MCNC: 0.66 MG/DL (ref 0.5–0.9)
EOSINOPHIL # BLD: 0.1 K/UL (ref 0–0.7)
EOSINOPHIL NFR BLD: 1.3 %
EPI CELLS #/AREA URNS AUTO: ABNORMAL /HPF (ref 0–5)
ERYTHROCYTE [DISTWIDTH] IN BLOOD BY AUTOMATED COUNT: 13.3 % (ref 11.5–14.5)
GLOBULIN SER CALC-MCNC: 3.4 G/DL (ref 2.3–3.5)
GLUCOSE SERPL-MCNC: 110 MG/DL (ref 70–99)
GLUCOSE UR STRIP-MCNC: NEGATIVE MG/DL
HCT VFR BLD AUTO: 40.3 % (ref 37–47)
HGB BLD-MCNC: 13.6 G/DL (ref 12–16)
HGB UR QL STRIP: NEGATIVE
HYALINE CASTS #/AREA URNS AUTO: ABNORMAL /HPF (ref 0–5)
INR PPP: 1
KETONES UR STRIP-MCNC: 15 MG/DL
LEUKOCYTE ESTERASE UR QL STRIP: ABNORMAL
LYMPHOCYTES # BLD: 1.7 K/UL (ref 1–4.8)
LYMPHOCYTES NFR BLD: 22.7 %
MAGNESIUM SERPL-MCNC: 2.1 MG/DL (ref 1.7–2.4)
MCH RBC QN AUTO: 31.1 PG (ref 27–31.3)
MCHC RBC AUTO-ENTMCNC: 33.7 % (ref 33–37)
MCV RBC AUTO: 92.2 FL (ref 79.4–94.8)
MONOCYTES # BLD: 0.6 K/UL (ref 0.2–0.8)
MONOCYTES NFR BLD: 7.8 %
NEUTROPHILS # BLD: 5.1 K/UL (ref 1.4–6.5)
NEUTS SEG NFR BLD: 67.7 %
NITRITE UR QL STRIP: NEGATIVE
PH UR STRIP: 5.5 [PH] (ref 5–9)
PLATELET # BLD AUTO: 335 K/UL (ref 130–400)
POTASSIUM SERPL-SCNC: 4.1 MEQ/L (ref 3.4–4.9)
PROT SERPL-MCNC: 8.2 G/DL (ref 6.3–8)
PROT UR STRIP-MCNC: NEGATIVE MG/DL
PROTHROMBIN TIME: 13.6 SEC (ref 12.3–14.9)
RBC # BLD AUTO: 4.37 M/UL (ref 4.2–5.4)
RBC #/AREA URNS HPF: ABNORMAL /HPF (ref 0–2)
SODIUM SERPL-SCNC: 138 MEQ/L (ref 135–144)
SP GR UR STRIP: 1.04 (ref 1–1.03)
TROPONIN, HIGH SENSITIVITY: 7 NG/L (ref 0–19)
TROPONIN, HIGH SENSITIVITY: 8 NG/L (ref 0–19)
URINE REFLEX TO CULTURE: YES
UROBILINOGEN UR STRIP-ACNC: 0.2 E.U./DL
WBC # BLD AUTO: 7.6 K/UL (ref 4.8–10.8)
WBC #/AREA URNS AUTO: ABNORMAL /HPF (ref 0–5)

## 2025-07-28 PROCEDURE — 85025 COMPLETE CBC W/AUTO DIFF WBC: CPT

## 2025-07-28 PROCEDURE — 36415 COLL VENOUS BLD VENIPUNCTURE: CPT

## 2025-07-28 PROCEDURE — 93005 ELECTROCARDIOGRAM TRACING: CPT

## 2025-07-28 PROCEDURE — 83880 ASSAY OF NATRIURETIC PEPTIDE: CPT

## 2025-07-28 PROCEDURE — 6360000002 HC RX W HCPCS

## 2025-07-28 PROCEDURE — 72128 CT CHEST SPINE W/O DYE: CPT

## 2025-07-28 PROCEDURE — 2500000003 HC RX 250 WO HCPCS: Performed by: INTERNAL MEDICINE

## 2025-07-28 PROCEDURE — 83735 ASSAY OF MAGNESIUM: CPT

## 2025-07-28 PROCEDURE — 6360000004 HC RX CONTRAST MEDICATION: Performed by: INTERNAL MEDICINE

## 2025-07-28 PROCEDURE — 80053 COMPREHEN METABOLIC PANEL: CPT

## 2025-07-28 PROCEDURE — 2580000003 HC RX 258: Performed by: INTERNAL MEDICINE

## 2025-07-28 PROCEDURE — 6360000002 HC RX W HCPCS: Performed by: INTERNAL MEDICINE

## 2025-07-28 PROCEDURE — 84484 ASSAY OF TROPONIN QUANT: CPT

## 2025-07-28 PROCEDURE — 71275 CT ANGIOGRAPHY CHEST: CPT

## 2025-07-28 PROCEDURE — 72131 CT LUMBAR SPINE W/O DYE: CPT

## 2025-07-28 PROCEDURE — 81001 URINALYSIS AUTO W/SCOPE: CPT

## 2025-07-28 PROCEDURE — 85610 PROTHROMBIN TIME: CPT

## 2025-07-28 PROCEDURE — 87086 URINE CULTURE/COLONY COUNT: CPT

## 2025-07-28 RX ORDER — DOXYCYCLINE HYCLATE 100 MG
100 TABLET ORAL 2 TIMES DAILY
Qty: 20 TABLET | Refills: 0 | Status: ON HOLD | OUTPATIENT
Start: 2025-07-28 | End: 2025-08-01 | Stop reason: HOSPADM

## 2025-07-28 RX ORDER — HYDROMORPHONE HYDROCHLORIDE 1 MG/ML
1 INJECTION, SOLUTION INTRAMUSCULAR; INTRAVENOUS; SUBCUTANEOUS ONCE
Status: COMPLETED | OUTPATIENT
Start: 2025-07-28 | End: 2025-07-28

## 2025-07-28 RX ORDER — IOPAMIDOL 755 MG/ML
75 INJECTION, SOLUTION INTRAVASCULAR
Status: COMPLETED | OUTPATIENT
Start: 2025-07-28 | End: 2025-07-28

## 2025-07-28 RX ORDER — SODIUM CHLORIDE, SODIUM LACTATE, POTASSIUM CHLORIDE, AND CALCIUM CHLORIDE .6; .31; .03; .02 G/100ML; G/100ML; G/100ML; G/100ML
1000 INJECTION, SOLUTION INTRAVENOUS ONCE
Status: COMPLETED | OUTPATIENT
Start: 2025-07-28 | End: 2025-07-28

## 2025-07-28 RX ORDER — ONDANSETRON 2 MG/ML
4 INJECTION INTRAMUSCULAR; INTRAVENOUS ONCE
Status: COMPLETED | OUTPATIENT
Start: 2025-07-28 | End: 2025-07-28

## 2025-07-28 RX ORDER — CEFUROXIME AXETIL 500 MG/1
500 TABLET ORAL 2 TIMES DAILY
Qty: 20 TABLET | Refills: 0 | Status: ON HOLD | OUTPATIENT
Start: 2025-07-28 | End: 2025-08-01 | Stop reason: HOSPADM

## 2025-07-28 RX ORDER — METOCLOPRAMIDE HYDROCHLORIDE 5 MG/ML
10 INJECTION INTRAMUSCULAR; INTRAVENOUS ONCE
Status: COMPLETED | OUTPATIENT
Start: 2025-07-28 | End: 2025-07-28

## 2025-07-28 RX ORDER — OXYCODONE HYDROCHLORIDE 10 MG/1
10 TABLET ORAL EVERY 8 HOURS PRN
Qty: 9 TABLET | Refills: 0 | Status: ON HOLD | OUTPATIENT
Start: 2025-07-28 | End: 2025-08-01 | Stop reason: HOSPADM

## 2025-07-28 RX ORDER — DIPHENHYDRAMINE HYDROCHLORIDE 50 MG/ML
12.5 INJECTION, SOLUTION INTRAMUSCULAR; INTRAVENOUS ONCE
Status: COMPLETED | OUTPATIENT
Start: 2025-07-28 | End: 2025-07-28

## 2025-07-28 RX ADMIN — SODIUM CHLORIDE, PRESERVATIVE FREE 20 MG: 5 INJECTION INTRAVENOUS at 19:42

## 2025-07-28 RX ADMIN — DIPHENHYDRAMINE HYDROCHLORIDE 12.5 MG: 50 INJECTION INTRAMUSCULAR; INTRAVENOUS at 21:10

## 2025-07-28 RX ADMIN — ONDANSETRON 4 MG: 2 INJECTION, SOLUTION INTRAMUSCULAR; INTRAVENOUS at 19:41

## 2025-07-28 RX ADMIN — METOCLOPRAMIDE 10 MG: 5 INJECTION, SOLUTION INTRAMUSCULAR; INTRAVENOUS at 21:11

## 2025-07-28 RX ADMIN — IOPAMIDOL 75 ML: 755 INJECTION, SOLUTION INTRAVENOUS at 20:52

## 2025-07-28 RX ADMIN — SODIUM CHLORIDE, SODIUM LACTATE, POTASSIUM CHLORIDE, AND CALCIUM CHLORIDE 1000 ML: .6; .31; .03; .02 INJECTION, SOLUTION INTRAVENOUS at 19:43

## 2025-07-28 RX ADMIN — HYDROMORPHONE HYDROCHLORIDE 1 MG: 1 INJECTION, SOLUTION INTRAMUSCULAR; INTRAVENOUS; SUBCUTANEOUS at 19:41

## 2025-07-28 ASSESSMENT — PAIN DESCRIPTION - LOCATION: LOCATION: FACE

## 2025-07-28 ASSESSMENT — PAIN - FUNCTIONAL ASSESSMENT
PAIN_FUNCTIONAL_ASSESSMENT: 0-10
PAIN_FUNCTIONAL_ASSESSMENT: 0-10

## 2025-07-28 ASSESSMENT — ENCOUNTER SYMPTOMS
SHORTNESS OF BREATH: 1
ABDOMINAL PAIN: 0
VOMITING: 0
COUGH: 0
DIARRHEA: 0
EYE PAIN: 0
NAUSEA: 0
SORE THROAT: 0
RHINORRHEA: 0
BACK PAIN: 1

## 2025-07-28 ASSESSMENT — PAIN SCALES - GENERAL: PAINLEVEL_OUTOF10: 6

## 2025-07-28 ASSESSMENT — PAIN DESCRIPTION - DESCRIPTORS: DESCRIPTORS: ACHING

## 2025-07-28 NOTE — ED TRIAGE NOTES
Arrived with report of back pain/left sided rib cage pain/ and left arm pain  Has recent dx of cancer on spine   Rates pain 8/10   Pain not being controlled by home meds

## 2025-07-28 NOTE — ED PROVIDER NOTES
Past Medical History:   Diagnosis Date    Asthma 1994    Breast cancer (HCC)     Diabetes mellitus (HCC) 2020    History of therapeutic radiation     Hx antineoplastic chemo     Hypertension     Obesity          SURGICAL HISTORY       Past Surgical History:   Procedure Laterality Date    BREAST BIOPSY      BREAST LUMPECTOMY      BREAST REDUCTION SURGERY      CT BIOPSY SUPERFICIAL BONE PERCUTANEOUS  7/2/2025    CT BIOPSY SUPERFICIAL BONE PERCUTANEOUS 7/2/2025 [facility] CT SCAN    HYSTERECTOMY (CERVIX STATUS UNKNOWN)      HYSTERECTOMY, TOTAL ABDOMINAL (CERVIX REMOVED)      OTHER SURGICAL HISTORY  07/02/2025    Sacral mass biopsy by Dr. Carolina    PAIN MANAGEMENT PROCEDURE Right 06/27/2025    Right Sided Trans Foraminal Epidural Steroid Injection performed by Sawyer Lyons MD at [facility] OR         CURRENT MEDICATIONS       Discharge Medication List as of 7/29/2025 12:14 AM        CONTINUE these medications which have NOT CHANGED    Details   cyclobenzaprine (FLEXERIL) 10 MG tablet Take 1 tablet by mouth 3 times daily as needed for Muscle spasmsHistorical Med      pregabalin (LYRICA) 100 MG capsule Take 1 capsule by mouth 3 times daily for 30 days. Max Daily Amount: 300 mg, Disp-90 capsule, R-0Normal      ketorolac (TORADOL) 10 MG tablet Take 1 tablet by mouth every 6 hours as needed for Pain, Disp-20 tablet, R-0Normal      Multiple Vitamins-Minerals (THERAPEUTIC MULTIVITAMIN-MINERALS) tablet Take 1 tablet by mouth dailyHistorical Med      Magnesium 400 MG CAPS Take 1 capsule by mouth dailyHistorical Med      losartan (COZAAR) 100 MG tablet Take 1 tablet by mouth daily, Disp-90 tablet, R-1Normal      meclizine (ANTIVERT) 25 MG tablet take 1 tablet by mouth three times a day if needed for dizziness, Disp-90 tablet, R-0Normal      aspirin 81 MG chewable tablet Take 1 tablet by mouth dailyHistorical Med             ALLERGIES     Augmentin [amoxicillin-pot clavulanate], Erythromycin, Lovastatin, Penicillins, and  Saturating well on room air.  I updated her on the need for oral antibiotics and return precautions.  Will send pulmonology referral. She had 90 norco filled 30 days ago. She also had 42 10mg oxy filled beginning of July. She needs to f/u with pain management. She requested a pill before DC  -Dr trujillo        CONSULTS:  None    PROCEDURES:  Unless otherwise noted below, none     Procedures      FINAL IMPRESSION      1. Pneumonia due to infectious organism, unspecified laterality, unspecified part of lung    2. 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 [facility] [address]  [phone]          Scotty Wilson MD  [address]  [address]  [facility] [address]  [phone]          Sawyer Lyons MD  [address]  [address]  [place] [address]  [phone]            DISCHARGE MEDICATIONS:  Discharge Medication List as of 7/29/2025 12:14 AM        START taking these medications    Details   cefUROXime (CEFTIN) 500 MG tablet Take 1 tablet by mouth 2 times daily for 10 days, Disp-20 tablet, R-0Normal      doxycycline hyclate (VIBRA-TABS) 100 MG tablet Take 1 tablet by mouth 2 times daily for 10 days, Disp-20 tablet, R-0Normal      oxyCODONE HCl (OXY-IR) 10 MG immediate release tablet Take 1 tablet by mouth every 8 hours as needed for Pain for up to 3 days. Intended supply: 7 days Max Daily Amount: 30 mg, Disp-9 tablet, R-0Normal           Controlled Substances Monitoring:          No data to display                (Please note that portions of this note were completed with a voice recognition program.  Efforts were made to edit the dictations but occasionally words are mis-transcribed.)    Bill Trujillo MD (electronically signed)  Attending Emergency Physician       Bill Trujillo MD  07/29/25 0144

## 2025-07-29 ENCOUNTER — APPOINTMENT (OUTPATIENT)
Dept: MRI IMAGING | Age: 70
DRG: 166 | End: 2025-07-29
Attending: INTERNAL MEDICINE
Payer: COMMERCIAL

## 2025-07-29 ENCOUNTER — HOSPITAL ENCOUNTER (INPATIENT)
Age: 70
LOS: 2 days | Discharge: HOME | DRG: 166 | End: 2025-08-01
Attending: INTERNAL MEDICINE | Admitting: INTERNAL MEDICINE
Payer: COMMERCIAL

## 2025-07-29 ENCOUNTER — HOSPITAL ENCOUNTER (EMERGENCY)
Age: 70
Discharge: ANOTHER HOSPITAL | End: 2025-07-29
Attending: EMERGENCY MEDICINE
Payer: COMMERCIAL

## 2025-07-29 VITALS
OXYGEN SATURATION: 92 % | BODY MASS INDEX: 32.76 KG/M2 | HEIGHT: 62 IN | TEMPERATURE: 97.8 F | SYSTOLIC BLOOD PRESSURE: 109 MMHG | RESPIRATION RATE: 18 BRPM | DIASTOLIC BLOOD PRESSURE: 55 MMHG | HEART RATE: 97 BPM | WEIGHT: 178 LBS

## 2025-07-29 VITALS
OXYGEN SATURATION: 92 % | SYSTOLIC BLOOD PRESSURE: 111 MMHG | TEMPERATURE: 97.9 F | HEART RATE: 99 BPM | RESPIRATION RATE: 18 BRPM | DIASTOLIC BLOOD PRESSURE: 55 MMHG

## 2025-07-29 DIAGNOSIS — J18.9 PNEUMONIA OF LEFT LOWER LOBE DUE TO INFECTIOUS ORGANISM: Primary | ICD-10-CM

## 2025-07-29 DIAGNOSIS — M53.3 SACRAL MASS: ICD-10-CM

## 2025-07-29 DIAGNOSIS — G89.3 CANCER RELATED PAIN: ICD-10-CM

## 2025-07-29 DIAGNOSIS — C79.9 METASTATIC MALIGNANT NEOPLASM, UNSPECIFIED SITE (HCC): ICD-10-CM

## 2025-07-29 DIAGNOSIS — M54.9 INTRACTABLE BACK PAIN: ICD-10-CM

## 2025-07-29 DIAGNOSIS — R91.8 LUNG MASS: Primary | ICD-10-CM

## 2025-07-29 DIAGNOSIS — N30.00 ACUTE CYSTITIS WITHOUT HEMATURIA: ICD-10-CM

## 2025-07-29 DIAGNOSIS — M54.59 INTRACTABLE LOW BACK PAIN: ICD-10-CM

## 2025-07-29 DIAGNOSIS — M84.48XA PATHOLOGICAL FRACTURE OF LUMBAR VERTEBRA, INITIAL ENCOUNTER: ICD-10-CM

## 2025-07-29 DIAGNOSIS — J18.9 PNEUMONIA OF LEFT LOWER LOBE DUE TO INFECTIOUS ORGANISM: ICD-10-CM

## 2025-07-29 PROBLEM — C80.1: Status: ACTIVE | Noted: 2025-07-29

## 2025-07-29 LAB
EKG ATRIAL RATE: 110 BPM
EKG DIAGNOSIS: NORMAL
EKG P AXIS: 76 DEGREES
EKG P-R INTERVAL: 136 MS
EKG Q-T INTERVAL: 324 MS
EKG QRS DURATION: 86 MS
EKG QTC CALCULATION (BAZETT): 438 MS
EKG R AXIS: -4 DEGREES
EKG T AXIS: 36 DEGREES
EKG VENTRICULAR RATE: 110 BPM

## 2025-07-29 PROCEDURE — 99285 EMERGENCY DEPT VISIT HI MDM: CPT

## 2025-07-29 PROCEDURE — 83520 IMMUNOASSAY QUANT NOS NONAB: CPT

## 2025-07-29 PROCEDURE — 2580000003 HC RX 258: Performed by: EMERGENCY MEDICINE

## 2025-07-29 PROCEDURE — 84165 PROTEIN E-PHORESIS SERUM: CPT

## 2025-07-29 PROCEDURE — 96365 THER/PROPH/DIAG IV INF INIT: CPT

## 2025-07-29 PROCEDURE — G0379 DIRECT REFER HOSPITAL OBSERV: HCPCS

## 2025-07-29 PROCEDURE — G0378 HOSPITAL OBSERVATION PER HR: HCPCS

## 2025-07-29 PROCEDURE — 72148 MRI LUMBAR SPINE W/O DYE: CPT

## 2025-07-29 PROCEDURE — 97165 OT EVAL LOW COMPLEX 30 MIN: CPT

## 2025-07-29 PROCEDURE — 72146 MRI CHEST SPINE W/O DYE: CPT

## 2025-07-29 PROCEDURE — 87040 BLOOD CULTURE FOR BACTERIA: CPT

## 2025-07-29 PROCEDURE — 84155 ASSAY OF PROTEIN SERUM: CPT

## 2025-07-29 PROCEDURE — 6370000000 HC RX 637 (ALT 250 FOR IP): Performed by: INTERNAL MEDICINE

## 2025-07-29 PROCEDURE — 6360000002 HC RX W HCPCS: Performed by: INTERNAL MEDICINE

## 2025-07-29 PROCEDURE — 6370000000 HC RX 637 (ALT 250 FOR IP): Performed by: EMERGENCY MEDICINE

## 2025-07-29 PROCEDURE — 99223 1ST HOSP IP/OBS HIGH 75: CPT | Performed by: INTERNAL MEDICINE

## 2025-07-29 PROCEDURE — 6360000002 HC RX W HCPCS: Performed by: EMERGENCY MEDICINE

## 2025-07-29 PROCEDURE — 36415 COLL VENOUS BLD VENIPUNCTURE: CPT

## 2025-07-29 PROCEDURE — 2580000003 HC RX 258: Performed by: INTERNAL MEDICINE

## 2025-07-29 PROCEDURE — 96375 TX/PRO/DX INJ NEW DRUG ADDON: CPT

## 2025-07-29 RX ORDER — ANASTROZOLE 1 MG/1
1 TABLET ORAL DAILY
Status: DISCONTINUED | OUTPATIENT
Start: 2025-07-29 | End: 2025-08-01 | Stop reason: HOSPADM

## 2025-07-29 RX ORDER — HYDROMORPHONE HYDROCHLORIDE 1 MG/ML
1 INJECTION, SOLUTION INTRAMUSCULAR; INTRAVENOUS; SUBCUTANEOUS ONCE
Refills: 0 | Status: COMPLETED | OUTPATIENT
Start: 2025-07-29 | End: 2025-07-29

## 2025-07-29 RX ORDER — ONDANSETRON 2 MG/ML
4 INJECTION INTRAMUSCULAR; INTRAVENOUS ONCE
Status: COMPLETED | OUTPATIENT
Start: 2025-07-29 | End: 2025-07-29

## 2025-07-29 RX ORDER — CELECOXIB 100 MG/1
100 CAPSULE ORAL 2 TIMES DAILY
Status: DISCONTINUED | OUTPATIENT
Start: 2025-07-29 | End: 2025-08-01 | Stop reason: HOSPADM

## 2025-07-29 RX ORDER — SODIUM CHLORIDE 9 MG/ML
INJECTION, SOLUTION INTRAVENOUS CONTINUOUS
Status: DISPENSED | OUTPATIENT
Start: 2025-07-29 | End: 2025-07-30

## 2025-07-29 RX ORDER — DEXAMETHASONE SODIUM PHOSPHATE 10 MG/ML
10 INJECTION, SOLUTION INTRAMUSCULAR; INTRAVENOUS ONCE
Status: COMPLETED | OUTPATIENT
Start: 2025-07-29 | End: 2025-07-29

## 2025-07-29 RX ORDER — 0.9 % SODIUM CHLORIDE 0.9 %
500 INTRAVENOUS SOLUTION INTRAVENOUS ONCE
Status: COMPLETED | OUTPATIENT
Start: 2025-07-29 | End: 2025-07-29

## 2025-07-29 RX ORDER — LORAZEPAM 2 MG/ML
1 INJECTION INTRAMUSCULAR ONCE
Status: COMPLETED | OUTPATIENT
Start: 2025-07-29 | End: 2025-07-29

## 2025-07-29 RX ORDER — DIPHENHYDRAMINE HCL 25 MG
25 TABLET ORAL EVERY 6 HOURS PRN
Status: DISCONTINUED | OUTPATIENT
Start: 2025-07-29 | End: 2025-08-01 | Stop reason: HOSPADM

## 2025-07-29 RX ORDER — ENOXAPARIN SODIUM 100 MG/ML
40 INJECTION SUBCUTANEOUS DAILY
Status: DISCONTINUED | OUTPATIENT
Start: 2025-07-29 | End: 2025-08-01 | Stop reason: HOSPADM

## 2025-07-29 RX ORDER — ACETAMINOPHEN 325 MG/1
650 TABLET ORAL EVERY 6 HOURS PRN
Status: DISCONTINUED | OUTPATIENT
Start: 2025-07-29 | End: 2025-08-01 | Stop reason: HOSPADM

## 2025-07-29 RX ORDER — DEXAMETHASONE SODIUM PHOSPHATE 4 MG/ML
4 INJECTION, SOLUTION INTRA-ARTICULAR; INTRALESIONAL; INTRAMUSCULAR; INTRAVENOUS; SOFT TISSUE EVERY 12 HOURS
Status: DISCONTINUED | OUTPATIENT
Start: 2025-07-29 | End: 2025-07-31

## 2025-07-29 RX ORDER — DIAZEPAM 5 MG/1
5 TABLET ORAL ONCE
Status: COMPLETED | OUTPATIENT
Start: 2025-07-29 | End: 2025-07-29

## 2025-07-29 RX ORDER — KETOROLAC TROMETHAMINE 30 MG/ML
30 INJECTION, SOLUTION INTRAMUSCULAR; INTRAVENOUS ONCE
Status: COMPLETED | OUTPATIENT
Start: 2025-07-29 | End: 2025-07-29

## 2025-07-29 RX ORDER — OXYCODONE HYDROCHLORIDE 5 MG/1
10 TABLET ORAL ONCE
Refills: 0 | Status: COMPLETED | OUTPATIENT
Start: 2025-07-29 | End: 2025-07-29

## 2025-07-29 RX ORDER — MORPHINE SULFATE 2 MG/ML
1 INJECTION, SOLUTION INTRAMUSCULAR; INTRAVENOUS
Status: DISCONTINUED | OUTPATIENT
Start: 2025-07-29 | End: 2025-07-30

## 2025-07-29 RX ORDER — OXYCODONE HYDROCHLORIDE 5 MG/1
5 TABLET ORAL EVERY 4 HOURS PRN
Refills: 0 | Status: DISCONTINUED | OUTPATIENT
Start: 2025-07-29 | End: 2025-07-31

## 2025-07-29 RX ORDER — PREGABALIN 50 MG/1
100 CAPSULE ORAL 2 TIMES DAILY
Status: DISCONTINUED | OUTPATIENT
Start: 2025-07-29 | End: 2025-08-01 | Stop reason: HOSPADM

## 2025-07-29 RX ORDER — ASPIRIN 81 MG/1
81 TABLET, CHEWABLE ORAL DAILY
Status: DISCONTINUED | OUTPATIENT
Start: 2025-07-29 | End: 2025-08-01 | Stop reason: HOSPADM

## 2025-07-29 RX ORDER — CYCLOBENZAPRINE HCL 10 MG
10 TABLET ORAL 2 TIMES DAILY
Status: DISCONTINUED | OUTPATIENT
Start: 2025-07-29 | End: 2025-08-01 | Stop reason: HOSPADM

## 2025-07-29 RX ADMIN — CYCLOBENZAPRINE HYDROCHLORIDE 10 MG: 10 TABLET, FILM COATED ORAL at 21:15

## 2025-07-29 RX ADMIN — OXYCODONE 10 MG: 5 TABLET ORAL at 00:11

## 2025-07-29 RX ADMIN — PREGABALIN 100 MG: 50 CAPSULE ORAL at 21:15

## 2025-07-29 RX ADMIN — ONDANSETRON 4 MG: 2 INJECTION, SOLUTION INTRAMUSCULAR; INTRAVENOUS at 05:35

## 2025-07-29 RX ADMIN — HYDROMORPHONE HYDROCHLORIDE 0.5 MG: 1 INJECTION, SOLUTION INTRAMUSCULAR; INTRAVENOUS; SUBCUTANEOUS at 16:12

## 2025-07-29 RX ADMIN — HYDROMORPHONE HYDROCHLORIDE 1 MG: 1 INJECTION, SOLUTION INTRAMUSCULAR; INTRAVENOUS; SUBCUTANEOUS at 05:36

## 2025-07-29 RX ADMIN — SODIUM CHLORIDE: 0.9 INJECTION, SOLUTION INTRAVENOUS at 18:57

## 2025-07-29 RX ADMIN — DOXYCYCLINE 100 MG: 100 INJECTION, POWDER, LYOPHILIZED, FOR SOLUTION INTRAVENOUS at 21:15

## 2025-07-29 RX ADMIN — SODIUM CHLORIDE 500 ML: 0.9 INJECTION, SOLUTION INTRAVENOUS at 08:10

## 2025-07-29 RX ADMIN — SODIUM CHLORIDE: 0.9 INJECTION, SOLUTION INTRAVENOUS at 10:08

## 2025-07-29 RX ADMIN — DEXAMETHASONE SODIUM PHOSPHATE 10 MG: 10 INJECTION, SOLUTION INTRAMUSCULAR; INTRAVENOUS at 11:24

## 2025-07-29 RX ADMIN — CYCLOBENZAPRINE HYDROCHLORIDE 10 MG: 10 TABLET, FILM COATED ORAL at 11:24

## 2025-07-29 RX ADMIN — DEXAMETHASONE SODIUM PHOSPHATE 4 MG: 4 INJECTION INTRA-ARTICULAR; INTRALESIONAL; INTRAMUSCULAR; INTRAVENOUS; SOFT TISSUE at 21:17

## 2025-07-29 RX ADMIN — ASPIRIN 81 MG: 81 TABLET, CHEWABLE ORAL at 11:24

## 2025-07-29 RX ADMIN — CEFTRIAXONE 1000 MG: 1 INJECTION, POWDER, FOR SOLUTION INTRAMUSCULAR; INTRAVENOUS at 08:16

## 2025-07-29 RX ADMIN — DIAZEPAM 5 MG: 5 TABLET ORAL at 17:38

## 2025-07-29 RX ADMIN — ENOXAPARIN SODIUM 40 MG: 100 INJECTION SUBCUTANEOUS at 10:11

## 2025-07-29 RX ADMIN — DOXYCYCLINE 100 MG: 100 INJECTION, POWDER, LYOPHILIZED, FOR SOLUTION INTRAVENOUS at 11:09

## 2025-07-29 RX ADMIN — LORAZEPAM 1 MG: 2 INJECTION INTRAMUSCULAR; INTRAVENOUS at 05:36

## 2025-07-29 RX ADMIN — PREGABALIN 100 MG: 50 CAPSULE ORAL at 11:24

## 2025-07-29 RX ADMIN — ANASTROZOLE 1 MG: 1 TABLET, COATED ORAL at 17:03

## 2025-07-29 RX ADMIN — KETOROLAC TROMETHAMINE 30 MG: 30 INJECTION, SOLUTION INTRAMUSCULAR at 10:06

## 2025-07-29 RX ADMIN — CELECOXIB 100 MG: 100 CAPSULE ORAL at 21:15

## 2025-07-29 ASSESSMENT — PAIN DESCRIPTION - LOCATION
LOCATION: BACK

## 2025-07-29 ASSESSMENT — ENCOUNTER SYMPTOMS
PHOTOPHOBIA: 0
ABDOMINAL DISTENTION: 0
CHEST TIGHTNESS: 0
EYE DISCHARGE: 0
SORE THROAT: 0
COUGH: 0
WHEEZING: 0
SHORTNESS OF BREATH: 0
BACK PAIN: 1
VOMITING: 0
ABDOMINAL PAIN: 0

## 2025-07-29 ASSESSMENT — LIFESTYLE VARIABLES
HOW MANY STANDARD DRINKS CONTAINING ALCOHOL DO YOU HAVE ON A TYPICAL DAY: PATIENT DOES NOT DRINK
HOW OFTEN DO YOU HAVE A DRINK CONTAINING ALCOHOL: NEVER
HOW OFTEN DO YOU HAVE A DRINK CONTAINING ALCOHOL: NEVER
HOW MANY STANDARD DRINKS CONTAINING ALCOHOL DO YOU HAVE ON A TYPICAL DAY: PATIENT DOES NOT DRINK

## 2025-07-29 ASSESSMENT — PAIN SCALES - GENERAL
PAINLEVEL_OUTOF10: 7
PAINLEVEL_OUTOF10: 10
PAINLEVEL_OUTOF10: 5
PAINLEVEL_OUTOF10: 8
PAINLEVEL_OUTOF10: 4
PAINLEVEL_OUTOF10: 5
PAINLEVEL_OUTOF10: 2
PAINLEVEL_OUTOF10: 8

## 2025-07-29 ASSESSMENT — PAIN DESCRIPTION - ORIENTATION
ORIENTATION: LOWER

## 2025-07-29 ASSESSMENT — PAIN DESCRIPTION - DESCRIPTORS
DESCRIPTORS: SORE
DESCRIPTORS: SORE;ACHING
DESCRIPTORS: SPASM;STABBING

## 2025-07-29 ASSESSMENT — PAIN - FUNCTIONAL ASSESSMENT
PAIN_FUNCTIONAL_ASSESSMENT: 0-10
PAIN_FUNCTIONAL_ASSESSMENT: PREVENTS OR INTERFERES SOME ACTIVE ACTIVITIES AND ADLS
PAIN_FUNCTIONAL_ASSESSMENT: PREVENTS OR INTERFERES SOME ACTIVE ACTIVITIES AND ADLS

## 2025-07-29 NOTE — CARE COORDINATION
Case Management Assessment  Initial Evaluation    Date/Time of Evaluation: 7/29/2025 12:25 PM  Assessment Completed by: Jocy Ag RN    If patient is discharged prior to next notation, then this note serves as note for discharge by case management.    Patient Name: Mari Goodrich                   YOB: 1955  Diagnosis: Metastatic cancer (HCC) [C79.9]  Cancer uncertain whether primary or metastatic (HCC) [C80.1]  Pain of metastatic malignancy [G89.3]                   Date / Time: 7/29/2025  8:58 AM    Patient Admission Status: Observation   Readmission Risk (Low < 19, Mod (19-27), High > 27): Readmission Risk Score: 6.4    Current PCP: Elvis Moore MD  PCP verified by CM? Yes    Chart Reviewed: Yes      History Provided by: Patient  Patient Orientation: Alert and Oriented    Patient Cognition: Alert    Hospitalization in the last 30 days (Readmission):  No    If yes, Readmission Assessment in CM Navigator will be completed.    Advance Directives:      Code Status: Full Code   Patient's Primary Decision Maker is: Named in Scanned ACP Document    Primary Decision Maker: SCOTTY GOODRICH HEATHER - Spouse - [phone]    Secondary Decision Maker: KpScotty IZAIAH - Child - [phone]    Discharge Planning:    Patient lives with: Spouse/Significant Other, Children Type of Home: House  Primary Care Giver: Self  Patient Support Systems include: Spouse/Significant Other, Children   Current Financial resources: Medicare  Current community resources: None  Current services prior to admission: None            Current DME:              Type of Home Care services:  None    ADLS  Prior functional level: Independent in ADLs/IADLs  Current functional level: Independent in ADLs/IADLs    PT AM-PAC:   /24  OT AM-PAC:   /24    Family can provide assistance at DC: Yes  Would you like Case Management to discuss the discharge plan with any other family members/significant others, and if so, who? Yes (SPOUSE)  Plans to

## 2025-07-29 NOTE — FLOWSHEET NOTE
1016: Patient direct admit from [facility]. Report received from Jose GAMA. Patient alert and oriented times 4, able to make all needs known. Medications reconciled with patient, Dr Kearns notified of admission, patient skin intact. Oriented to room and call light. Medicated for pain. Call light in reach, will continue to monitor throughout this shift. .Electronically signed by Suad Trevino RN on 7/29/2025 at 10:22 AM

## 2025-07-29 NOTE — CONSULTS
Pulmonary Medicine  Consult Note      Reason for consultation: Left base mass/effusion/    Consulting physician: Dr. Kearns    HISTORY OF PRESENT ILLNESS:      This is a 70-year-old female with past medical history significant for breast cancer status post radiation, asthma, hypertension, diabetes mellitus, obesity who was recently found to have bony lytic lesion.  She had sacral biopsy beginning with July came back inconclusive.  Patient continued to have lower back discomfort.  She said she has some cough which is mostly dry.  She presented to [facility] ER after she was discharged from [facility] to the emergency room.  She was found to have a UTI, pneumonia.  He was accepted to transfer back to [place] to proceed with additional diagnostic and therapeutic intervention.  Patient only has minimal cough.  Denies having any hemoptysis.  She has only If she has coughing spell..  No abdominal pain, nausea or vomiting.  CT chest shows no acute pulm embolism left lower lobe pneumonia.  Pulmonary was consulted regarding left lower lobe pneumonia with nodular opacity no pleural effusion..  There is pathological fracture involving superior endplate of L2.  Destructive osteolytic lesion involving right sacrum S2 and S3.    Past Medical History:        Diagnosis Date    Asthma 1994    Breast cancer (HCC)     Diabetes mellitus (HCC) 2020    History of therapeutic radiation     Hx antineoplastic chemo     Hypertension     Obesity        Past Surgical History:        Procedure Laterality Date    BREAST BIOPSY      BREAST LUMPECTOMY      BREAST REDUCTION SURGERY      CT BIOPSY SUPERFICIAL BONE PERCUTANEOUS  7/2/2025    CT BIOPSY SUPERFICIAL BONE PERCUTANEOUS 7/2/2025 [facility] CT SCAN    HYSTERECTOMY (CERVIX STATUS UNKNOWN)      HYSTERECTOMY, TOTAL ABDOMINAL (CERVIX REMOVED)      OTHER SURGICAL HISTORY  07/02/2025    Sacral mass biopsy by Dr. Carolina    PAIN MANAGEMENT PROCEDURE Right 06/27/2025    Right Sided Trans Foraminal

## 2025-07-29 NOTE — ED NOTES
Spoke with Dr May regarding pt's allergy to PCN. She states pt's only issue was rash.ok to give rocephin. Pt states only has rash with pcn.

## 2025-07-29 NOTE — ED NOTES
[facility] called and provided bed assignment to [facility] room 463. Report [phone].

## 2025-07-29 NOTE — ED NOTES
Attempted to call report to [facility] but no one was available and they where unsure who was getting pt. Floor to call this Rn when available

## 2025-07-29 NOTE — ED PROVIDER NOTES
Expenses: Not hard at all   Food Insecurity: No Food Insecurity (6/27/2025)    Hunger Vital Sign     Worried About Running Out of Food in the Last Year: Never true     Ran Out of Food in the Last Year: Never true   Transportation Needs: No Transportation Needs (6/27/2025)    PRAPARE - Transportation     Lack of Transportation (Medical): No     Lack of Transportation (Non-Medical): No   Physical Activity: Sufficiently Active (1/2/2025)    Exercise Vital Sign     Days of Exercise per Week: 5 days     Minutes of Exercise per Session: 150+ min   Housing Stability: Low Risk  (6/27/2025)    Housing Stability Vital Sign     Unable to Pay for Housing in the Last Year: No     Number of Times Moved in the Last Year: 0     Homeless in the Last Year: No       SCREENINGS    [place] Coma Scale  Eye Opening: Spontaneous  Best Verbal Response: Oriented  Best Motor Response: Obeys commands  [place] Coma Scale Score: 15 @FLOW([id number])@      PHYSICAL EXAM    (up to 7 for level 4, 8 or more for level 5)     ED Triage Vitals [07/29/25 0523]   BP Systolic BP Percentile Diastolic BP Percentile Temp Temp Source Pulse Respirations SpO2   138/73 -- -- 97.8 °F (36.6 °C) Oral (!) 120 22 96 %      Height Weight - Scale         1.575 m (5' 2\") 80.7 kg (178 lb)             Physical Exam  Vitals and nursing note reviewed.   Constitutional:       Appearance: She is well-developed. She is not ill-appearing.      Comments: Patient presents in a great deal of pain.  The pain is 10 out of 10 of her back   HENT:      Head: Normocephalic and atraumatic.      Nose: Nose normal.      Mouth/Throat:      Mouth: Mucous membranes are moist.   Eyes:      Conjunctiva/sclera: Conjunctivae normal.      Pupils: Pupils are equal, round, and reactive to light.   Cardiovascular:      Rate and Rhythm: Normal rate and regular rhythm.      Heart sounds: Normal heart sounds.   Pulmonary:      Effort: Pulmonary effort is normal.      Breath sounds: Normal breath sounds.

## 2025-07-29 NOTE — PROGRESS NOTES
Pharmacy Note - Weight/BMI Adjustment(s)    Ceftriaxone 1g Q24h for treatment of Urinary tract infection. Per [facility] Ceftriaxone Dosing Guidance Document, ceftriaxone will be changed to 2g Q24h    Estimated Creatinine Clearance: Estimated Creatinine Clearance: 80 mL/min (based on SCr of 0.66 mg/dL).    BMI: There is no height or weight on file to calculate BMI.    Please call with any questions.    Thank you,    Gail Deutsch [facility]     ,

## 2025-07-29 NOTE — PROGRESS NOTES
[facility] OCCUPATIONAL THERAPY EVALUATION - ACUTE     NAME: Mari Goodrich  : 1955 (70 y.o.)  MRN: [id number]  CODE STATUS: Full Code  [facility]    Date of Service: 2025    Patient Diagnosis(es): Metastatic cancer (HCC) [C79.9]  Cancer uncertain whether primary or metastatic (HCC) [C80.1]  Pain of metastatic malignancy [G89.3]   Patient Active Problem List    Diagnosis Date Noted    Cancer uncertain whether primary or metastatic (HCC) 2025    Pain of metastatic malignancy 2025    Sacral mass 2025    Intractable low back pain 2025    Lumbar radiculitis 2025    Prediabetes 2020    Essential hypertension 2020    Mixed hyperlipidemia 2020    History of left breast cancer 2020    H/O total hysterectomy 2020    FH: diabetes mellitus 2020    Degenerative disc disease, cervical 2017    Carpal tunnel syndrome of right wrist 2017      No data found    Past Medical History:   Diagnosis Date    Asthma     Breast cancer (HCC)     Diabetes mellitus (HCC)     History of therapeutic radiation     Hx antineoplastic chemo     Hypertension     Obesity      Past Surgical History:   Procedure Laterality Date    BREAST BIOPSY      BREAST LUMPECTOMY      BREAST REDUCTION SURGERY      CT BIOPSY SUPERFICIAL BONE PERCUTANEOUS  2025    CT BIOPSY SUPERFICIAL BONE PERCUTANEOUS 2025 [facility] CT SCAN    HYSTERECTOMY (CERVIX STATUS UNKNOWN)      HYSTERECTOMY, TOTAL ABDOMINAL (CERVIX REMOVED)      OTHER SURGICAL HISTORY  2025    Sacral mass biopsy by Dr. Carolina    PAIN MANAGEMENT PROCEDURE Right 2025    Right Sided Trans Foraminal Epidural Steroid Injection performed by Sawyer Lyons MD at [facility] OR        Restrictions:N/A                    Safety Devices: Safety Devices  Type of Devices: Call light within reach;Left in bed     Treatment Diagnosis:   Decreased Activities of Daily Living Z73.6    Patient's date of birth confirmed:

## 2025-07-29 NOTE — PROGRESS NOTES
Spiritual Health History and Assessment/Progress Note  [facility]    Attempted Encounter,  ,  ,      Name: Mari Goodrich MRN: [id number]    Age: 70 y.o.     Sex: female   Language: English   Sikh: Faith   Cancer uncertain whether primary or metastatic (HCC)     Date: 7/29/2025            Total Time Calculated: 15 min              Spiritual Assessment began in [facility]  4W MED SURG UNIT        Referral/Consult From: Rounding   Encounter Overview/Reason: Attempted Encounter  Service Provided For: Patient    The pt was resting on the bed, the pt was exhausted, and would like to rest. But the pt did request for a visit later or tomorrow. The  will follow up tomorrow.    Abril, Belief, Meaning:   Patient unable to assess at this time  Family/Friends No family/friends present      Importance and Influence:  Patient unable to assess at this time  Family/Friends No family/friends present    Community:  Patient Other:    Family/Friends No family/friends present    Assessment and Plan of Care:     Patient Interventions include: Other:    Family/Friends Interventions include: No family/friends present    Patient Plan of Care: Other:    Family/Friends Plan of Care: No family/friends present    Electronically signed by Chaplain Eric on 7/29/2025 at 6:57 PM

## 2025-07-29 NOTE — H&P
Hospital Medicine  History and Physical    Patient:  Mari Goodrich  MRN: [id number]    CHIEF COMPLAINT:  No chief complaint on file.      History Obtained From:  Patient, EMR  Primary Care Physician: Elvis Moore MD    HISTORY OF PRESENT ILLNESS:   The patient is a 70 y.o. female with PMH of hypertension.  Patient was recently found to have bony lytic lesion.  She had sacral biopsy beginning of July which came back inconclusive.  Patient continues to have lower back discomfort.  Patient also reported having some cough.  She presented to [facility] emergency room after she was discharged from [facility] emergency room.  She was found to have UTI, pneumonia and ongoing bleeding lesion therefore I had accepted to transfer her back to [facility] to proceed with additional diagnostic and therapeutic intervention.  Patient reported having minimal cough.  No hemoptysis.  No abdominal pain.  Nausea but no vomiting.  History of breast cancer.    Past Medical History:      Diagnosis Date    Asthma 1994    Breast cancer (HCC)     Diabetes mellitus (HCC) 2020    History of therapeutic radiation     Hx antineoplastic chemo     Hypertension     Obesity        Past Surgical History:      Procedure Laterality Date    BREAST BIOPSY      BREAST LUMPECTOMY      BREAST REDUCTION SURGERY      CT BIOPSY SUPERFICIAL BONE PERCUTANEOUS  7/2/2025    CT BIOPSY SUPERFICIAL BONE PERCUTANEOUS 7/2/2025 [facility] CT SCAN    HYSTERECTOMY (CERVIX STATUS UNKNOWN)      HYSTERECTOMY, TOTAL ABDOMINAL (CERVIX REMOVED)      OTHER SURGICAL HISTORY  07/02/2025    Sacral mass biopsy by Dr. Carolina    PAIN MANAGEMENT PROCEDURE Right 06/27/2025    Right Sided Trans Foraminal Epidural Steroid Injection performed by Sawyer Lyons MD at [facility] OR       Medications Prior to Admission:    Prior to Admission medications    Medication Sig Start Date End Date Taking? Authorizing Provider   cefUROXime (CEFTIN) 500 MG tablet Take 1 tablet by mouth 2 times daily for 10 days  85mins where I focused more than 75% of my attention on rendering care, and planning treatment course for this patient, in addition to talking to RN team, mid levels, consulting with other physicians and following up on labs and imaging.    High Risk Readmission Screening Tool Score Noted.     Emergency Contact:

## 2025-07-29 NOTE — FLOWSHEET NOTE
1741: Valium given for MRI, transport in, report given to Everette in MRI .Electronically signed by Suad Trevino RN on 7/29/2025 at 5:42 PM

## 2025-07-29 NOTE — FLOWSHEET NOTE
1707: Patient reports itching from Dilaudid, per patient pain medication is very effective for her, requesting something for the itch, perfect serve sent to Dr Kearns in regards to this matter. .Electronically signed by Suad Trevino RN on 7/29/2025 at 5:08 PM

## 2025-07-29 NOTE — CARE COORDINATION
Met with patient. Patient denies any issues obtaining their medications or supplies. Patient also denies any issues getting to follow up appointments.   Definition of pneumonia discussed.  Causes of different types of pneumonia reviewed.  Symptoms discussed and pt does understand that they may have some or all of these symptoms.  Testing to diagnose pneumonia reviewed as well as possible treatments.  Importance of avoiding infections discussed including: taking medication as directed, washing hands, disposing of used tissues, getting the pneumonia and flu vaccines, and avoiding others who are ill.  Importance of not smoking and to avoid others who may be smoking around patient stressed.  Pneumonia Zones also reviewed. \"Green\" zone is the goal, \"Yellow\" zone means to call the doctor, and \"Red\" zone means to call the doctor ASAP or call 911.  Copies of Pneumonia booklet and Zone Pamphlet given to patient.  Offer for patient to express any concerns or questions. Pt denies having further questions at this time.

## 2025-07-29 NOTE — ACP (ADVANCE CARE PLANNING)
Advance Care Planning   Healthcare Decision Maker:    Primary Decision Maker: SANDRA MARR - Spouse - [phone]    Secondary Decision Maker: Sandra Marr - Child - [phone]

## 2025-07-29 NOTE — ED NOTES
Pt sleeping on entering room. Skin pink w/d resp non labored.  at bedside and aware of admit to [facility] but waiting for call back from hospitalist.

## 2025-07-29 NOTE — CONSULTS
Hematology/Oncology Consult  Encounter Date: 2025 4:03 PM    Ms. Mari Goodrich is a 70 y.o. female  : 1955  MRN: [id number]  Acct Number: [id number]  Requesting Provider: dr Kearns    Reason for request: metastatic cancer , mets to bones      CONSULTANT: Carlos Loera MD    HPI: Mari was admitted fro lower back pain. Ct scan showed fractured L2. Also has lytic lesion in sacrum. Biopsy from  showed no malignancy. She had history of breast cancer in  and had lumpectomy , chemotherapy, XRT and tamoxifen x 3 years at [facility]. CA 27.29 last week at 702. This suggest breast cancer origin. Will start on anastrozole. Will need pain control for l2 fracture. And radiation therapy in the future.     Patient Active Problem List   Diagnosis    Degenerative disc disease, cervical    Carpal tunnel syndrome of right wrist    Essential hypertension    Mixed hyperlipidemia    History of left breast cancer    H/O total hysterectomy    FH: diabetes mellitus    Prediabetes    Intractable low back pain    Lumbar radiculitis    Sacral mass    Cancer uncertain whether primary or metastatic (HCC)    Pain of metastatic malignancy     Past Medical History:   Diagnosis Date    Asthma     Breast cancer (HCC)     Diabetes mellitus (HCC) 2020    History of therapeutic radiation     Hx antineoplastic chemo     Hypertension     Obesity      @PSH@  Family History   Problem Relation Age of Onset    Arthritis Mother     Stomach Cancer Mother     Alcohol Abuse Father     Heart Disease Father     Diabetes type 2  Father     Heart Attack Father     Diabetes Father     Diabetes type 2  Brother     Lung Cancer Brother     Leukemia Maternal Grandmother     Asthma Paternal Grandmother     Diabetes Brother      Social History     Socioeconomic History    Marital status:      Spouse name: Not on file    Number of children: Not on file    Years of education: Not on file    Highest education level: Not on file   Occupational  Following universal protocol, patient and site verification was performed with a \"timeout\" prior to the procedure. The patient was placed on the CT table in prone position and the posterior pelvis area was prepped and draped in usual sterile fashion.  Using the usual sterile conditions, lidocaine and CT guidance, the right posterior sacral destructive soft tissue mass was accessed using an 18-guage coaxial biopsy needle system.  After confirmation of appropriate localization of the needle, total of 2 samples were obtained and sent for pathological analysis. Flow cytometry specimen was also sent. The coaxial needle system was removed and hemostasis was achieved by direct digital compression.  The patient tolerated the procedure well.  No immediate complications identified.  The patient left the CT suite in supine position to the recovery room in stable condition. Total local anesthetic used: lidocaine, approximately 8 mL. Estimated blood loss: Negligible. The patient was observed in the recovery room for two hours after the procedure and discharged in stable condition. Electronically signed by Jose Carolina    MRI BRAIN W WO CONTRAST  Result Date: 7/1/2025  EXAM: MRI BRAIN WITH AND WITHOUT CONTRAST 07/01/2025 10:50:37 AM TECHNIQUE: Multiplanar multisequence MRI of the head/brain was performed with and without the administration of intravenous contrast. COMPARISON: None available. CLINICAL HISTORY: Headache. Sacral mass, rule out brain mets. FINDINGS: BRAIN AND VENTRICLES: Minimal cerebral volume loss. Minimal chronic microvascular ischemic changes. Both are in the lower range, specifically seen at this age. No abnormal intracranial enhancement. No acute infarct. No acute intracranial hemorrhage. No mass effect or midline shift. No hydrocephalus. The sella is unremarkable. Normal flow voids. No mass. ORBITS: No acute abnormality. SINUSES: No acute abnormality. BONES AND SOFT TISSUES: Normal bone marrow signal and

## 2025-07-30 PROBLEM — Z51.5 PALLIATIVE CARE ENCOUNTER: Status: ACTIVE | Noted: 2025-07-30

## 2025-07-30 PROBLEM — G89.3 CANCER RELATED PAIN: Status: ACTIVE | Noted: 2025-07-30

## 2025-07-30 PROBLEM — Z71.89 GOALS OF CARE, COUNSELING/DISCUSSION: Status: ACTIVE | Noted: 2025-07-30

## 2025-07-30 PROBLEM — Z71.89 ADVANCED CARE PLANNING/COUNSELING DISCUSSION: Status: ACTIVE | Noted: 2025-07-30

## 2025-07-30 PROBLEM — M54.50 BACK PAIN AT L4-L5 LEVEL: Status: ACTIVE | Noted: 2025-07-30

## 2025-07-30 LAB
AFP-TM SERPL-MCNC: 2.6 UG/L
ANION GAP SERPL CALCULATED.3IONS-SCNC: 10 MEQ/L (ref 9–15)
BACTERIA UR CULT: NORMAL
BASOPHILS # BLD: 0 K/UL (ref 0–0.2)
BASOPHILS NFR BLD: 0.2 %
BUN SERPL-MCNC: 15 MG/DL (ref 8–23)
CALCIUM SERPL-MCNC: 9.4 MG/DL (ref 8.5–9.9)
CANCER AG19-9 SERPL IA-ACNC: 34 U/ML (ref 0–35)
CARCINOEMBRYONIC ANTIGEN: 11.8 NG/ML (ref 0–3.8)
CHLORIDE SERPL-SCNC: 106 MEQ/L (ref 95–107)
CO2 SERPL-SCNC: 24 MEQ/L (ref 20–31)
CREAT SERPL-MCNC: 0.54 MG/DL (ref 0.5–0.9)
EOSINOPHIL # BLD: 0 K/UL (ref 0–0.7)
EOSINOPHIL NFR BLD: 0 %
ERYTHROCYTE [DISTWIDTH] IN BLOOD BY AUTOMATED COUNT: 13.2 % (ref 11.5–14.5)
GLUCOSE SERPL-MCNC: 148 MG/DL (ref 70–99)
HCT VFR BLD AUTO: 34.2 % (ref 37–47)
HGB BLD-MCNC: 11.3 G/DL (ref 12–16)
LYMPHOCYTES # BLD: 0.4 K/UL (ref 1–4.8)
LYMPHOCYTES NFR BLD: 8.5 %
MCH RBC QN AUTO: 30.5 PG (ref 27–31.3)
MCHC RBC AUTO-ENTMCNC: 33 % (ref 33–37)
MCV RBC AUTO: 92.4 FL (ref 79.4–94.8)
MONOCYTES # BLD: 0.1 K/UL (ref 0.2–0.8)
MONOCYTES NFR BLD: 3 %
NEUTROPHILS # BLD: 4.1 K/UL (ref 1.4–6.5)
NEUTS SEG NFR BLD: 87.9 %
PLATELET # BLD AUTO: 238 K/UL (ref 130–400)
POTASSIUM SERPL-SCNC: 4.5 MEQ/L (ref 3.4–4.9)
PTH-INTACT SERPL-MCNC: 16 PG/ML (ref 15–65)
RBC # BLD AUTO: 3.7 M/UL (ref 4.2–5.4)
SODIUM SERPL-SCNC: 140 MEQ/L (ref 135–144)
WBC # BLD AUTO: 4.7 K/UL (ref 4.8–10.8)

## 2025-07-30 PROCEDURE — 2580000003 HC RX 258: Performed by: INTERNAL MEDICINE

## 2025-07-30 PROCEDURE — 6360000002 HC RX W HCPCS: Performed by: INTERNAL MEDICINE

## 2025-07-30 PROCEDURE — 82378 CARCINOEMBRYONIC ANTIGEN: CPT

## 2025-07-30 PROCEDURE — 82542 COL CHROMOTOGRAPHY QUAL/QUAN: CPT

## 2025-07-30 PROCEDURE — 6370000000 HC RX 637 (ALT 250 FOR IP): Performed by: INTERNAL MEDICINE

## 2025-07-30 PROCEDURE — 86301 IMMUNOASSAY TUMOR CA 19-9: CPT

## 2025-07-30 PROCEDURE — 6360000002 HC RX W HCPCS

## 2025-07-30 PROCEDURE — 6370000000 HC RX 637 (ALT 250 FOR IP)

## 2025-07-30 PROCEDURE — 85025 COMPLETE CBC W/AUTO DIFF WBC: CPT

## 2025-07-30 PROCEDURE — 82105 ALPHA-FETOPROTEIN SERUM: CPT

## 2025-07-30 PROCEDURE — 2500000003 HC RX 250 WO HCPCS: Performed by: INTERNAL MEDICINE

## 2025-07-30 PROCEDURE — 99233 SBSQ HOSP IP/OBS HIGH 50: CPT | Performed by: INTERNAL MEDICINE

## 2025-07-30 PROCEDURE — 83970 ASSAY OF PARATHORMONE: CPT

## 2025-07-30 PROCEDURE — 80048 BASIC METABOLIC PNL TOTAL CA: CPT

## 2025-07-30 PROCEDURE — 1210000000 HC MED SURG R&B

## 2025-07-30 PROCEDURE — 36415 COLL VENOUS BLD VENIPUNCTURE: CPT

## 2025-07-30 PROCEDURE — 99223 1ST HOSP IP/OBS HIGH 75: CPT

## 2025-07-30 RX ORDER — FENTANYL 12.5 UG/1
1 PATCH TRANSDERMAL
Refills: 0 | Status: DISCONTINUED | OUTPATIENT
Start: 2025-07-30 | End: 2025-08-01 | Stop reason: HOSPADM

## 2025-07-30 RX ORDER — MORPHINE SULFATE 2 MG/ML
2 INJECTION, SOLUTION INTRAMUSCULAR; INTRAVENOUS
Status: DISCONTINUED | OUTPATIENT
Start: 2025-07-30 | End: 2025-07-30

## 2025-07-30 RX ADMIN — DEXAMETHASONE SODIUM PHOSPHATE 4 MG: 4 INJECTION INTRA-ARTICULAR; INTRALESIONAL; INTRAMUSCULAR; INTRAVENOUS; SOFT TISSUE at 09:14

## 2025-07-30 RX ADMIN — PREGABALIN 100 MG: 50 CAPSULE ORAL at 20:56

## 2025-07-30 RX ADMIN — OXYCODONE 5 MG: 5 TABLET ORAL at 23:58

## 2025-07-30 RX ADMIN — ANASTROZOLE 1 MG: 1 TABLET, COATED ORAL at 17:47

## 2025-07-30 RX ADMIN — OXYCODONE 5 MG: 5 TABLET ORAL at 19:53

## 2025-07-30 RX ADMIN — OXYCODONE 5 MG: 5 TABLET ORAL at 16:20

## 2025-07-30 RX ADMIN — HYDROMORPHONE HYDROCHLORIDE 0.5 MG: 1 INJECTION, SOLUTION INTRAMUSCULAR; INTRAVENOUS; SUBCUTANEOUS at 22:10

## 2025-07-30 RX ADMIN — DEXAMETHASONE SODIUM PHOSPHATE 4 MG: 4 INJECTION INTRA-ARTICULAR; INTRALESIONAL; INTRAMUSCULAR; INTRAVENOUS; SOFT TISSUE at 20:57

## 2025-07-30 RX ADMIN — ASPIRIN 81 MG: 81 TABLET, CHEWABLE ORAL at 09:09

## 2025-07-30 RX ADMIN — PREGABALIN 100 MG: 50 CAPSULE ORAL at 09:09

## 2025-07-30 RX ADMIN — CYCLOBENZAPRINE HYDROCHLORIDE 10 MG: 10 TABLET, FILM COATED ORAL at 09:10

## 2025-07-30 RX ADMIN — DOXYCYCLINE 100 MG: 100 INJECTION, POWDER, LYOPHILIZED, FOR SOLUTION INTRAVENOUS at 21:00

## 2025-07-30 RX ADMIN — MORPHINE SULFATE 1 MG: 2 INJECTION, SOLUTION INTRAMUSCULAR; INTRAVENOUS at 18:09

## 2025-07-30 RX ADMIN — CELECOXIB 100 MG: 100 CAPSULE ORAL at 09:09

## 2025-07-30 RX ADMIN — WATER 2000 MG: 1 INJECTION INTRAMUSCULAR; INTRAVENOUS; SUBCUTANEOUS at 09:11

## 2025-07-30 RX ADMIN — OXYCODONE 5 MG: 5 TABLET ORAL at 02:03

## 2025-07-30 RX ADMIN — CELECOXIB 100 MG: 100 CAPSULE ORAL at 20:55

## 2025-07-30 RX ADMIN — DOXYCYCLINE 100 MG: 100 INJECTION, POWDER, LYOPHILIZED, FOR SOLUTION INTRAVENOUS at 09:25

## 2025-07-30 RX ADMIN — OXYCODONE 5 MG: 5 TABLET ORAL at 09:28

## 2025-07-30 RX ADMIN — CYCLOBENZAPRINE HYDROCHLORIDE 10 MG: 10 TABLET, FILM COATED ORAL at 20:56

## 2025-07-30 RX ADMIN — ENOXAPARIN SODIUM 40 MG: 100 INJECTION SUBCUTANEOUS at 09:10

## 2025-07-30 ASSESSMENT — PAIN DESCRIPTION - DESCRIPTORS
DESCRIPTORS: DISCOMFORT

## 2025-07-30 ASSESSMENT — PAIN SCALES - GENERAL
PAINLEVEL_OUTOF10: 9
PAINLEVEL_OUTOF10: 9
PAINLEVEL_OUTOF10: 6
PAINLEVEL_OUTOF10: 4
PAINLEVEL_OUTOF10: 7
PAINLEVEL_OUTOF10: 8
PAINLEVEL_OUTOF10: 3
PAINLEVEL_OUTOF10: 8
PAINLEVEL_OUTOF10: 7
PAINLEVEL_OUTOF10: 8
PAINLEVEL_OUTOF10: 5
PAINLEVEL_OUTOF10: 5

## 2025-07-30 ASSESSMENT — PAIN DESCRIPTION - LOCATION
LOCATION: BUTTOCKS
LOCATION: BACK
LOCATION: LEG
LOCATION: BACK;LEG
LOCATION: BACK
LOCATION: BUTTOCKS
LOCATION: BACK

## 2025-07-30 ASSESSMENT — ENCOUNTER SYMPTOMS
COLOR CHANGE: 0
COUGH: 0
CONSTIPATION: 1
TROUBLE SWALLOWING: 0
VOICE CHANGE: 0
NAUSEA: 0
DIARRHEA: 0
BACK PAIN: 1
VOMITING: 0
SHORTNESS OF BREATH: 0

## 2025-07-30 ASSESSMENT — PAIN - FUNCTIONAL ASSESSMENT: PAIN_FUNCTIONAL_ASSESSMENT: ACTIVITIES ARE NOT PREVENTED

## 2025-07-30 ASSESSMENT — PAIN DESCRIPTION - ORIENTATION
ORIENTATION: RIGHT

## 2025-07-30 NOTE — PROGRESS NOTES
Physical Therapy Missed Treatment   Facility/Department: [facility] MED SURG W480/W480-01    NAME: Mari Goodrich    : 1955 (70 y.o.)  MRN: [id number]    Account: [id number]  Gender: female      Attempted to see pt for eval - pt with nursing for care      Will follow and attempt PT evaluation again at earliest availability.       Angie Bran, PT, 25 at 9:13 AM

## 2025-07-30 NOTE — PROGRESS NOTES
Hospitalist Progress Note      PCP: Elvis Moore MD    Date of Admission: 7/29/2025    Chief Complaint:  no acute events, is afebrile, stable HD, on RA    Medications:  Reviewed    Infusion Medications   Scheduled Medications    enoxaparin  40 mg SubCUTAneous Daily    doxycycline (VIBRAMYCIN) IV  100 mg IntraVENous Q12H    cefTRIAXone (ROCEPHIN) 2,000 mg in sterile water 20 mL IV syringe  2,000 mg IntraVENous Q24H    dexAMETHasone  4 mg IntraVENous Q12H    aspirin  81 mg Oral Daily    cyclobenzaprine  10 mg Oral BID    pregabalin  100 mg Oral BID    celecoxib  100 mg Oral BID    anastrozole  1 mg Oral Daily     PRN Meds: oxyCODONE, acetaminophen, diphenhydrAMINE, morphine      Intake/Output Summary (Last 24 hours) at 7/30/2025 1145  Last data filed at 7/30/2025 0536  Gross per 24 hour   Intake 2352.01 ml   Output --   Net 2352.01 ml       Exam:    /67   Pulse 79   Temp 97.5 °F (36.4 °C) (Oral)   Resp 16   Ht 1.575 m (5' 2\")   Wt 80.7 kg (178 lb)   SpO2 92%   BMI 32.56 kg/m²     General appearance: awake, cooperative.  Respiratory:  CTA bilaterally.  Cardiovascular: Regular rate and rhythm, S1/S2.  Abdomen: Soft   Musculoskeletal: No edema bilaterally.         Labs:   Recent Labs     07/28/25 1752 07/30/25  0616   WBC 7.6 4.7*   HGB 13.6 11.3*   HCT 40.3 34.2*    238     Recent Labs     07/28/25  1752 07/30/25  0616    140   K 4.1 4.5    106   CO2 22 24   BUN 17 15   CREATININE 0.66 0.54   CALCIUM 10.5* 9.4     Recent Labs     07/28/25  1752   AST 21   ALT 15   BILITOT 0.3   ALKPHOS 154*     Recent Labs     07/28/25  1943   INR 1.0     No results for input(s): \"CKTOTAL\", \"TROPONINI\" in the last 72 hours.    Urinalysis:      Lab Results   Component Value Date/Time    NITRU Negative 07/28/2025 10:15 PM    WBCUA  07/28/2025 10:15 PM    BACTERIA Negative 07/28/2025 10:15 PM    RBCUA 0-2 07/28/2025 10:15 PM    BLOODU Negative 07/28/2025 10:15 PM    GLUCOSEU Negative 07/28/2025

## 2025-07-30 NOTE — PROGRESS NOTES
Physical Therapy Missed Treatment   Facility/Department: [facility] MED SURG W480/W480-01    NAME: Mari Goodrich    : 1955 (70 y.o.)  MRN: [id number]    Account: [id number]  Gender: female      Pts chart reviewed. Pt was indep for OT and pt reports she has showered indep and walked throughout this unit indep. Pt reports she feels safe and comfortable for return to home. No PT needs identified and no PT eval warranted.       Angie Bran, PT, 25 at 9:46 AM

## 2025-07-30 NOTE — CONSULTS
07/30/25 0909    cyclobenzaprine (FLEXERIL) tablet 10 mg  10 mg Oral BID Lyn Kearns MD   10 mg at 07/30/25 0910    pregabalin (LYRICA) capsule 100 mg  100 mg Oral BID Lyn Kearns MD   100 mg at 07/30/25 0909    celecoxib (CELEBREX) capsule 100 mg  100 mg Oral BID Lyn Kearns MD   100 mg at 07/30/25 0909    anastrozole (ARIMIDEX) tablet 1 mg  1 mg Oral Daily Carlos Loera MD   1 mg at 07/29/25 1703    diphenhydrAMINE (BENADRYL) tablet 25 mg  25 mg Oral Q6H PRN Lyn Kearns MD        morphine (PF) injection 1 mg  1 mg IntraVENous Q3H PRN Lyn Kearns MD           History:      PMHx:  Past Medical History:   Diagnosis Date    Asthma 1994    Breast cancer (HCC)     Diabetes mellitus (HCC) 2020    History of therapeutic radiation     Hx antineoplastic chemo     Hypertension     Obesity        PSHx:  Past Surgical History:   Procedure Laterality Date    BREAST BIOPSY      BREAST LUMPECTOMY      BREAST REDUCTION SURGERY      CT BIOPSY SUPERFICIAL BONE PERCUTANEOUS  7/2/2025    CT BIOPSY SUPERFICIAL BONE PERCUTANEOUS 7/2/2025 [facility] CT SCAN    HYSTERECTOMY (CERVIX STATUS UNKNOWN)      HYSTERECTOMY, TOTAL ABDOMINAL (CERVIX REMOVED)      OTHER SURGICAL HISTORY  07/02/2025    Sacral mass biopsy by Dr. Carolina    PAIN MANAGEMENT PROCEDURE Right 06/27/2025    Right Sided Trans Foraminal Epidural Steroid Injection performed by Sawyer Lyons MD at [facility] [place]       Social Hx:  Social History     Socioeconomic History    Marital status:    Tobacco Use    Smoking status: Never    Smokeless tobacco: Never   Vaping Use    Vaping status: Never Used   Substance and Sexual Activity    Alcohol use: No    Drug use: No    Sexual activity: Not Currently     Partners: Male     Social Drivers of Health     Financial Resource Strain: Low Risk  (4/4/2023)    Overall Financial Resource Strain (CARDIA)     Difficulty of Paying Living Expenses: Not hard at all   Food Insecurity: No Food Insecurity (7/29/2025)    Hunger Vital  07/30/2025 06:16 AM    EOSABS 0.0 07/30/2025 06:16 AM    BASOSABS 0.0 07/30/2025 06:16 AM     CMP:    Lab Results   Component Value Date/Time     07/30/2025 06:16 AM    K 4.5 07/30/2025 06:16 AM    K 4.3 06/28/2025 05:41 AM     07/30/2025 06:16 AM    CO2 24 07/30/2025 06:16 AM    BUN 15 07/30/2025 06:16 AM    CREATININE 0.54 07/30/2025 06:16 AM    GFRAA >60.0 06/12/2021 07:53 AM    LABGLOM >90.0 07/30/2025 06:16 AM    LABGLOM >60.0 04/06/2023 02:30 PM    GLUCOSE 148 07/30/2025 06:16 AM    CALCIUM 9.4 07/30/2025 06:16 AM    BILITOT 0.3 07/28/2025 05:52 PM    ALKPHOS 154 07/28/2025 05:52 PM    AST 21 07/28/2025 05:52 PM    ALT 15 07/28/2025 05:52 PM     BMP:    Lab Results   Component Value Date/Time     07/30/2025 06:16 AM    K 4.5 07/30/2025 06:16 AM    K 4.3 06/28/2025 05:41 AM     07/30/2025 06:16 AM    CO2 24 07/30/2025 06:16 AM    BUN 15 07/30/2025 06:16 AM    CREATININE 0.54 07/30/2025 06:16 AM    CALCIUM 9.4 07/30/2025 06:16 AM    GFRAA >60.0 06/12/2021 07:53 AM    LABGLOM >90.0 07/30/2025 06:16 AM    LABGLOM >60.0 04/06/2023 02:30 PM    GLUCOSE 148 07/30/2025 06:16 AM     TSH: No results found for: \"TSH\"  Urinalysis:   Lab Results   Component Value Date/Time    NITRU Negative 07/28/2025 10:15 PM    WBCUA  07/28/2025 10:15 PM    BACTERIA Negative 07/28/2025 10:15 PM    RBCUA 0-2 07/28/2025 10:15 PM    BLOODU Negative 07/28/2025 10:15 PM    GLUCOSEU Negative 07/28/2025 10:15 PM     Albumin: No results found for: \"LABPROT\", \"LABALBU\"  Weight Trends  Wt Readings from Last 10 Encounters:   07/29/25 80.7 kg (178 lb)   07/29/25 80.7 kg (178 lb)   07/24/25 82.1 kg (181 lb)   07/07/25 85.6 kg (188 lb 12.8 oz)   07/07/25 83 kg (183 lb)   06/27/25 86.2 kg (190 lb)   06/26/25 86.2 kg (190 lb)   05/16/25 87.5 kg (192 lb 12.8 oz)   01/08/25 85.7 kg (189 lb)   11/17/23 85.2 kg (187 lb 12.8 oz)           Radiology: Reviewed      MRI THORACIC SPINE WO CONTRAST  Result Date: 7/29/2025  EXAM: MRI

## 2025-07-30 NOTE — PROGRESS NOTES
INPATIENT PROGRESS NOTES    PATIENT NAME: Mari Goodrich  MRN: [id number]  SERVICE DATE:  July 30, 2025   SERVICE TIME:  10:28 AM      PRIMARY SERVICE: Pulmonary Disease    CHIEF COMPLAIN: Back pain and right hip pain, left lower lobe pneumonia      INTERVAL HPI: Patient seen and examined at bedside, Interval Notes, orders reviewed. Nursing notes noted  Patient underwent for MRI of thoracolumbar spine.  She said she has complaint of pain in right hip areas.  Back pain is tolerable.  She does have a cough which is mostly dry sometimes she brings out small amount of mucus.  No fever or chills.  No nausea, vomiting, diarrhea or abdominal pain.         OBJECTIVE   I/O:24HR INTAKE/OUTPUT:    Intake/Output Summary (Last 24 hours) at 7/30/2025 1028  Last data filed at 7/30/2025 0536  Gross per 24 hour   Intake 2352.01 ml   Output --   Net 2352.01 ml     07/29 0701 - 07/30 0700  In: 2352 [P.O.:240; I.V.:1933.7]  Out: -   Body mass index is 32.56 kg/m².    PHYSICAL EXAM:  Vitals:  /67   Pulse 79   Temp 97.5 °F (36.4 °C) (Oral)   Resp 16   Ht 1.575 m (5' 2\")   Wt 80.7 kg (178 lb)   SpO2 92%   BMI 32.56 kg/m²     General: Alert, awake .comfortable in bed, No distress.  Head: Atraumatic , Normocephalic   Eyes: PERRL. No sclera icterus. No conjunctival injection. No discharge   ENT: No nasal  discharge. Pharynx clear.  Neck:  Trachea midline. No thyromegaly, no JVD, No cervical adenopathy.  Chest : Bilaterally symmetrical ,Normal effort,  No accessory muscle use  Lung : Diminished breath sound bilaterally left basilar rales.  No wheezing. No rhonchi.   Heart:: Normal rate. Regular rhythm. No mumur ,  Rub or gallop  ABD: Non-tender. Non-distended. No masses. No organmegaly. Normal bowel sounds. No hernia.  Ext : No Pitting both leg , No Cyanosis No clubbing  Neuro: no focal weakness    Labs:  CBC:   Recent Labs     07/28/25  1752 07/30/25  0616   WBC 7.6 4.7*   HGB 13.6 11.3*   HCT 40.3 34.2*    238     BMP:

## 2025-07-30 NOTE — CARE COORDINATION
CM ENTERED ROOM TO DISCUSS DC PLANNING.  PT DENIES HOME GOING NEEDS.  PALL CARE CONSULTED FOR PAIN MGMT.

## 2025-07-30 NOTE — PROGRESS NOTES
Hematology/Oncology   Progress Note        CHIEF COMPLAINT/HPI:  Follow up metastatic cancer involving the bone. Likely breast in origin. Started on anastrozole yesterday. Palliative care working with pain management. When discharged will add a CDK4/6 medication for breast cancer.     REVIEW OF SYSTEMS:    Unremarkable except for symptoms mentioned in HPI.    Current Inpatient Medications:    Current Facility-Administered Medications   Medication Dose Route Frequency Provider Last Rate Last Admin    fentaNYL (DURAGESIC) 12 MCG/HR 1 patch  1 patch TransDERmal Q72H Ivette Santana, APRN - CNP        oxyCODONE (ROXICODONE) immediate release tablet 5 mg  5 mg Oral Q4H PRN Lyn Kearns MD   5 mg at 07/30/25 0928    acetaminophen (TYLENOL) tablet 650 mg  650 mg Oral Q6H PRN Lyn Kearns MD        enoxaparin (LOVENOX) injection 40 mg  40 mg SubCUTAneous Daily Lyn Kearns MD   40 mg at 07/30/25 0910    doxycycline (VIBRAMYCIN) 100 mg in sodium chloride 0.9 % 100 mL IVPB  100 mg IntraVENous Q12H Lyn Kearns MD   Stopped at 07/30/25 1025    cefTRIAXone (ROCEPHIN) 2,000 mg in sterile water 20 mL IV syringe  2,000 mg IntraVENous Q24H Lyn Kearns MD   2,000 mg at 07/30/25 0911    dexAMETHasone (DECADRON) IntraVENous 4 mg  4 mg IntraVENous Q12H Lyn Kearns MD   4 mg at 07/30/25 0914    aspirin chewable tablet 81 mg  81 mg Oral Daily Lyn Kearns MD   81 mg at 07/30/25 0909    cyclobenzaprine (FLEXERIL) tablet 10 mg  10 mg Oral BID Lyn Kearns MD   10 mg at 07/30/25 0910    pregabalin (LYRICA) capsule 100 mg  100 mg Oral BID Lyn Kearns MD   100 mg at 07/30/25 0909    celecoxib (CELEBREX) capsule 100 mg  100 mg Oral BID Lyn Kearns MD   100 mg at 07/30/25 0909    anastrozole (ARIMIDEX) tablet 1 mg  1 mg Oral Daily Carlos Loera MD   1 mg at 07/29/25 1703    diphenhydrAMINE (BENADRYL) tablet 25 mg  25 mg Oral Q6H PRN Lyn Kearns MD        morphine (PF) injection 1 mg  1 mg IntraVENous

## 2025-07-31 PROBLEM — R91.8 LUNG MASS: Status: ACTIVE | Noted: 2025-07-31

## 2025-07-31 LAB
ALBUMIN SERPL-MCNC: 3.9 G/DL (ref 3.5–4.6)
ANION GAP SERPL CALCULATED.3IONS-SCNC: 10 MEQ/L (ref 9–15)
ANISOCYTOSIS BLD QL SMEAR: ABNORMAL
BASOPHILS # BLD: 0 K/UL (ref 0–0.2)
BASOPHILS NFR BLD: 0.1 %
BUN SERPL-MCNC: 15 MG/DL (ref 8–23)
CALCIUM SERPL-MCNC: 9.2 MG/DL (ref 8.5–9.9)
CHLORIDE SERPL-SCNC: 105 MEQ/L (ref 95–107)
CO2 SERPL-SCNC: 25 MEQ/L (ref 20–31)
CREAT SERPL-MCNC: 0.57 MG/DL (ref 0.5–0.9)
CRP SERPL HS-MCNC: 22.5 MG/L (ref 0–5)
DEPRECATED KAPPA LC FREE/LAMBDA SER: 0.9 {RATIO} (ref 0.26–1.65)
EOSINOPHIL # BLD: 0 K/UL (ref 0–0.7)
EOSINOPHIL NFR BLD: 0 %
ERYTHROCYTE [DISTWIDTH] IN BLOOD BY AUTOMATED COUNT: 13.3 % (ref 11.5–14.5)
GLUCOSE SERPL-MCNC: 131 MG/DL (ref 70–99)
HCT VFR BLD AUTO: 32.8 % (ref 37–47)
HGB BLD-MCNC: 10.9 G/DL (ref 12–16)
INR PPP: 1
KAPPA LC FREE SER-MCNC: 16.34 MG/L (ref 3.3–19.4)
LAMBDA LC FREE SERPL-MCNC: 18.16 MG/L (ref 5.71–26.3)
LYMPHOCYTES # BLD: 0.3 K/UL (ref 1–4.8)
LYMPHOCYTES NFR BLD: 4 %
MAGNESIUM SERPL-MCNC: 2.1 MG/DL (ref 1.7–2.4)
MCH RBC QN AUTO: 30.4 PG (ref 27–31.3)
MCHC RBC AUTO-ENTMCNC: 33.2 % (ref 33–37)
MCV RBC AUTO: 91.6 FL (ref 79.4–94.8)
MONOCYTES # BLD: 0.2 K/UL (ref 0.2–0.8)
MONOCYTES NFR BLD: 2.9 %
NEUTROPHILS # BLD: 7.6 K/UL (ref 1.4–6.5)
NEUTS SEG NFR BLD: 93 %
PHOSPHATE SERPL-MCNC: 3.7 MG/DL (ref 2.3–4.8)
PLATELET # BLD AUTO: 285 K/UL (ref 130–400)
PLATELET BLD QL SMEAR: ADEQUATE
POIKILOCYTOSIS BLD QL SMEAR: ABNORMAL
POTASSIUM SERPL-SCNC: 4.1 MEQ/L (ref 3.4–4.9)
PROCALCITONIN SERPL IA-MCNC: <0.02 NG/ML (ref 0–0.15)
PROTHROMBIN TIME: 14 SEC (ref 12.3–14.9)
RBC # BLD AUTO: 3.58 M/UL (ref 4.2–5.4)
SLIDE REVIEW: ABNORMAL
SMUDGE CELLS BLD QL SMEAR: 2.9
SODIUM SERPL-SCNC: 140 MEQ/L (ref 135–144)
WBC # BLD AUTO: 8.2 K/UL (ref 4.8–10.8)

## 2025-07-31 PROCEDURE — 6370000000 HC RX 637 (ALT 250 FOR IP)

## 2025-07-31 PROCEDURE — 2500000003 HC RX 250 WO HCPCS: Performed by: INTERNAL MEDICINE

## 2025-07-31 PROCEDURE — 83735 ASSAY OF MAGNESIUM: CPT

## 2025-07-31 PROCEDURE — 99233 SBSQ HOSP IP/OBS HIGH 50: CPT | Performed by: INTERNAL MEDICINE

## 2025-07-31 PROCEDURE — 84145 PROCALCITONIN (PCT): CPT

## 2025-07-31 PROCEDURE — 80069 RENAL FUNCTION PANEL: CPT

## 2025-07-31 PROCEDURE — 6370000000 HC RX 637 (ALT 250 FOR IP): Performed by: INTERNAL MEDICINE

## 2025-07-31 PROCEDURE — 85610 PROTHROMBIN TIME: CPT

## 2025-07-31 PROCEDURE — 1210000000 HC MED SURG R&B

## 2025-07-31 PROCEDURE — 99233 SBSQ HOSP IP/OBS HIGH 50: CPT

## 2025-07-31 PROCEDURE — 6360000002 HC RX W HCPCS: Performed by: INTERNAL MEDICINE

## 2025-07-31 PROCEDURE — 6360000002 HC RX W HCPCS

## 2025-07-31 PROCEDURE — 86140 C-REACTIVE PROTEIN: CPT

## 2025-07-31 PROCEDURE — 36415 COLL VENOUS BLD VENIPUNCTURE: CPT

## 2025-07-31 PROCEDURE — 85025 COMPLETE CBC W/AUTO DIFF WBC: CPT

## 2025-07-31 RX ORDER — OXYCODONE HYDROCHLORIDE 5 MG/1
10 TABLET ORAL EVERY 4 HOURS PRN
Refills: 0 | Status: DISCONTINUED | OUTPATIENT
Start: 2025-07-31 | End: 2025-08-01 | Stop reason: HOSPADM

## 2025-07-31 RX ADMIN — HYDROMORPHONE HYDROCHLORIDE 0.5 MG: 1 INJECTION, SOLUTION INTRAMUSCULAR; INTRAVENOUS; SUBCUTANEOUS at 00:27

## 2025-07-31 RX ADMIN — OXYCODONE 10 MG: 5 TABLET ORAL at 21:07

## 2025-07-31 RX ADMIN — CELECOXIB 100 MG: 100 CAPSULE ORAL at 09:40

## 2025-07-31 RX ADMIN — OXYCODONE 10 MG: 5 TABLET ORAL at 17:30

## 2025-07-31 RX ADMIN — ANASTROZOLE 1 MG: 1 TABLET, COATED ORAL at 09:42

## 2025-07-31 RX ADMIN — PREGABALIN 100 MG: 50 CAPSULE ORAL at 09:40

## 2025-07-31 RX ADMIN — WATER 2000 MG: 1 INJECTION INTRAMUSCULAR; INTRAVENOUS; SUBCUTANEOUS at 09:37

## 2025-07-31 RX ADMIN — ENOXAPARIN SODIUM 40 MG: 100 INJECTION SUBCUTANEOUS at 09:40

## 2025-07-31 RX ADMIN — ASPIRIN 81 MG: 81 TABLET, CHEWABLE ORAL at 09:40

## 2025-07-31 RX ADMIN — CYCLOBENZAPRINE HYDROCHLORIDE 10 MG: 10 TABLET, FILM COATED ORAL at 09:40

## 2025-07-31 RX ADMIN — DEXAMETHASONE SODIUM PHOSPHATE 4 MG: 4 INJECTION INTRA-ARTICULAR; INTRALESIONAL; INTRAMUSCULAR; INTRAVENOUS; SOFT TISSUE at 09:40

## 2025-07-31 RX ADMIN — PREGABALIN 100 MG: 50 CAPSULE ORAL at 21:08

## 2025-07-31 RX ADMIN — OXYCODONE 5 MG: 5 TABLET ORAL at 09:40

## 2025-07-31 ASSESSMENT — ENCOUNTER SYMPTOMS
COUGH: 0
VOMITING: 0
NAUSEA: 0
DIARRHEA: 0
VOICE CHANGE: 0
CONSTIPATION: 1
COLOR CHANGE: 0
BACK PAIN: 1
SHORTNESS OF BREATH: 0
TROUBLE SWALLOWING: 0

## 2025-07-31 ASSESSMENT — PAIN DESCRIPTION - DESCRIPTORS
DESCRIPTORS: DISCOMFORT
DESCRIPTORS: DISCOMFORT

## 2025-07-31 ASSESSMENT — PAIN DESCRIPTION - ORIENTATION
ORIENTATION: RIGHT;LEFT
ORIENTATION: RIGHT

## 2025-07-31 ASSESSMENT — PAIN SCALES - GENERAL
PAINLEVEL_OUTOF10: 5
PAINLEVEL_OUTOF10: 5
PAINLEVEL_OUTOF10: 2
PAINLEVEL_OUTOF10: 2
PAINLEVEL_OUTOF10: 4
PAINLEVEL_OUTOF10: 5
PAINLEVEL_OUTOF10: 6
PAINLEVEL_OUTOF10: 2

## 2025-07-31 ASSESSMENT — PAIN DESCRIPTION - LOCATION
LOCATION: BACK
LOCATION: BACK;LEG

## 2025-07-31 NOTE — PROGRESS NOTES
Hospitalist Progress Note      PCP: Elvis Moore MD    Date of Admission: 7/29/2025    Chief Complaint:  no acute events, is afebrile, stable HD, on RA    Medications:  Reviewed    Infusion Medications   Scheduled Medications    fentaNYL  1 patch TransDERmal Q72H    [Held by provider] enoxaparin  40 mg SubCUTAneous Daily    dexAMETHasone  4 mg IntraVENous Q12H    aspirin  81 mg Oral Daily    cyclobenzaprine  10 mg Oral BID    pregabalin  100 mg Oral BID    [Held by provider] celecoxib  100 mg Oral BID    anastrozole  1 mg Oral Daily     PRN Meds: oxyCODONE, HYDROmorphone, acetaminophen, diphenhydrAMINE      Intake/Output Summary (Last 24 hours) at 7/31/2025 1339  Last data filed at 7/31/2025 0900  Gross per 24 hour   Intake 360 ml   Output --   Net 360 ml       Exam:    /62   Pulse 75   Temp 98.2 °F (36.8 °C) (Oral)   Resp 16   Ht 1.575 m (5' 2\")   Wt 80.7 kg (178 lb)   SpO2 95%   BMI 32.56 kg/m²     General appearance: awake, cooperative.  Respiratory:  CTA bilaterally.  Cardiovascular: Regular rate and rhythm, S1/S2.  Abdomen: Soft   Musculoskeletal: No edema bilaterally.         Labs:   Recent Labs     07/28/25  1752 07/30/25  0616 07/31/25  0521   WBC 7.6 4.7* 8.2   HGB 13.6 11.3* 10.9*   HCT 40.3 34.2* 32.8*    238 285     Recent Labs     07/28/25  1752 07/30/25  0616 07/31/25  0521    140 140   K 4.1 4.5 4.1    106 105   CO2 22 24 25   BUN 17 15 15   CREATININE 0.66 0.54 0.57   CALCIUM 10.5* 9.4 9.2   PHOS  --   --  3.7     Recent Labs     07/28/25  1752   AST 21   ALT 15   BILITOT 0.3   ALKPHOS 154*     Recent Labs     07/28/25  1943 07/31/25  1018   INR 1.0 1.0     No results for input(s): \"CKTOTAL\", \"TROPONINI\" in the last 72 hours.    Urinalysis:      Lab Results   Component Value Date/Time    NITRU Negative 07/28/2025 10:15 PM    WBCUA  07/28/2025 10:15 PM    BACTERIA Negative 07/28/2025 10:15 PM    RBCUA 0-2 07/28/2025 10:15 PM    BLOODU Negative 07/28/2025 10:15

## 2025-07-31 NOTE — PLAN OF CARE
Problem: Chronic Conditions and Co-morbidities  Goal: Patient's chronic conditions and co-morbidity symptoms are monitored and maintained or improved  7/31/2025 1059 by Gabbi Copeland RN  Outcome: Progressing  7/31/2025 0544 by Janey Rodriguez, RN  Outcome: Progressing     Problem: Discharge Planning  Goal: Discharge to home or other facility with appropriate resources  7/31/2025 1059 by Gabbi Copeland RN  Outcome: Progressing  7/31/2025 0544 by Janey Rodriguez, RN  Outcome: Progressing     Problem: Pain  Goal: Verbalizes/displays adequate comfort level or baseline comfort level  7/31/2025 1059 by Gabbi Copeland RN  Outcome: Progressing  7/31/2025 0544 by Janey Rodriguez, RN  Outcome: Progressing

## 2025-07-31 NOTE — PROGRESS NOTES
Palliative Care Consult Note  Patient: Mari Goodrich  Gender: female  YOB: 1955  Unit/Bed: W480/W480-01  CodeStatus: Full Code  Inpatient Treatment Team: Treatment Team:   Marybeth Ball MD Litam, MD Katie Quiles Ashok P, MD Harp, Yarely Arroyo, APRN - CNP  Virgie Rushing Amanda, Marisela Best RN Cintron, Connor Damon, Jerome Mace MD Downs, Dariana Irene, Safia Turcios RN  Admit Date:  7/29/2025    Chief Complaint: Back pain    History of Presenting Illness:      Mari Goodrich is a 70 y.o. female on hospital day 1 with a history of HTN, HLD,  hot flashes,  asthma, breast cancer in the 90s and now new lytic lesions of her spine and pelvis.  Went to emergency department for severe back pain for the past couple months. Patient was admitted in the setting of intractable back pain, sacral mass.     Palliative care was consulted by Dr. Ball for goals of care.     Upon entering the room I find the patient alert and oriented x 3,  at bedside. Baseline functional status is independent.  Patient's previous functional status is independent at baseline. Reports back pain began  6 months ago but has been progressively getting worse. She  began with chiropractor care then saw pain management. Had norco that was tried and failed. Is on lyrica. Reports effective pain control with decadron, oxycodone but continues to have exacerbations. Denies fever/chills. Denies nausea/vomiting. Denies cp/sob. Reports some constipation, last BM 4 days ago.        7/31/25:  Patient alert and oriented x 3, no resp. Distress noted. Plan for lung biopsy tomorrow via Pulmonology. Reports some improvement with fentanyl patch continues to need IV dilaudid PRN for breakthrough.     Review of Systems:       Review of Systems   Constitutional:  Positive for activity change and fatigue.   HENT:  Negative for trouble swallowing and voice change.    Respiratory:  Negative for

## 2025-07-31 NOTE — PROGRESS NOTES
INPATIENT PROGRESS NOTES    PATIENT NAME: Mari Goodrich  MRN: [id number]  SERVICE DATE:  July 31, 2025   SERVICE TIME:  10:01 AM      PRIMARY SERVICE: Pulmonary Disease    CHIEF COMPLAINTS: Lung mass    INTERVAL HPI: Patient seen and examined at bedside, Interval Notes, orders reviewed. Nursing notes noted    Patient report left pelvic pain, and rib pain, no chest pain, no shortness of breath, no cough, she is on room air saturating 95%    New information updated in the note today, rest of the examination did not change compared to yesterday.    Review of system:     GI Abdominal pain No  Skin Rash No    Social History     Tobacco Use    Smoking status: Never    Smokeless tobacco: Never   Substance Use Topics    Alcohol use: No         Problem Relation Age of Onset    Arthritis Mother     Stomach Cancer Mother     Alcohol Abuse Father     Heart Disease Father     Diabetes type 2  Father     Heart Attack Father     Diabetes Father     Diabetes type 2  Brother     Lung Cancer Brother     Leukemia Maternal Grandmother     Asthma Paternal Grandmother     Diabetes Brother          OBJECTIVE    Body mass index is 32.56 kg/m².    PHYSICAL EXAM:  Vitals:  /62   Pulse 75   Temp 98.2 °F (36.8 °C) (Oral)   Resp 16   Ht 1.575 m (5' 2\")   Wt 80.7 kg (178 lb)   SpO2 95%   BMI 32.56 kg/m²     General: alert, cooperative, no distress  Head: normocephalic, atraumatic  Eyes:No gross abnormalities.  ENT:  MMM no lesions  Neck:  supple and no masses  Chest : clear to auscultation bilaterally- no wheezes, rales or rhonchi, normal air movement, no respiratory distress  Heart:: Heart sounds are normal.  Regular rate and rhythm without murmur, gallop or rub.  ABD:  symmetric, soft, non-tender  Musculoskeletal : no cyanosis, no clubbing, and no edema  Neuro:  Grossly normal  Skin: No rashes or nodules noted.  Lymph node:  no cervical nodes  Urology: No Fuentes   Psychiatric: appropriate    DATA:   Recent Labs     07/30/25  0616  07/31/25  0521   WBC 4.7* 8.2   HGB 11.3* 10.9*   HCT 34.2* 32.8*   MCV 92.4 91.6    285     Recent Labs     07/28/25  1752 07/30/25  0616 07/31/25  0521    140 140   K 4.1 4.5 4.1    106 105   CO2 22 24 25   BUN 17 15 15   CREATININE 0.66 0.54 0.57   GLUCOSE 110* 148* 131*   CALCIUM 10.5* 9.4 9.2   BILITOT 0.3  --   --    ALKPHOS 154*  --   --    AST 21  --   --    ALT 15  --   --    LABGLOM >90.0 >90.0 >90.0   GLOB 3.4  --   --        MV Settings:          No results for input(s): \"PHART\", \"GLI1FDB\", \"PO2ART\", \"VUR8JRE\", \"BEART\", \"M1QPSTBQ\" in the last 72 hours.    O2 Device: None (Room air)    ADULT DIET; Regular     MEDICATIONS during current hospitalization:    Continuous Infusions:    Scheduled Meds:   fentaNYL  1 patch TransDERmal Q72H    enoxaparin  40 mg SubCUTAneous Daily    dexAMETHasone  4 mg IntraVENous Q12H    aspirin  81 mg Oral Daily    cyclobenzaprine  10 mg Oral BID    pregabalin  100 mg Oral BID    celecoxib  100 mg Oral BID    anastrozole  1 mg Oral Daily       PRN Meds:HYDROmorphone, oxyCODONE, acetaminophen, diphenhydrAMINE    Radiology  Chest x-ray films reviewed by me CT chest masslike infiltrate left lower lobe        IMPRESSION AND SUGGESTION:  Patient is at risk due to   Left lower lobe masslike infiltrate rule out malignancy, organizing pneumonia, or other diagnosis  Lytic bone lesion, CT-guided biopsy nondiagnostic  Hypercalcemia    Recommendation  Will proceed with bronchoscopy and transbronchial biopsy, EBUS TBNA tomorrow,  Discussed with patient risks include but not limited to bleeding, infection, lung collapse , cardiac arrhythmia, need for other procedures or allergy to med, benefits and alternatives discussed with patient. Patient  agrees to proceed with procedure     DW Dr Ball          Electronically signed by Jerome Bejarano MD,  FCCP   on 7/31/2025 at 10:01 AM     Detail Level: Generalized

## 2025-07-31 NOTE — PROGRESS NOTES
Hematology/Oncology   Progress Note        CHIEF COMPLAINT/HPI:  Follow up of metastatic cancer with bone metastasis. Dr Bejarano planning for bronchoscopy for a suspected mass in left lower lobe. +History of breast cancer in the past, suspect recurrent disease. On anastrozole.    REVIEW OF SYSTEMS:    Unremarkable except for symptoms mentioned in HPI.    Current Inpatient Medications:    Current Facility-Administered Medications   Medication Dose Route Frequency Provider Last Rate Last Admin    oxyCODONE (ROXICODONE) immediate release tablet 10 mg  10 mg Oral Q4H PRN Ivette Santana APRN - CNP        fentaNYL (DURAGESIC) 12 MCG/HR 1 patch  1 patch TransDERmal Q72H Ivette Santana APRN - CNP   1 patch at 07/30/25 1733    HYDROmorphone (DILAUDID) injection 0.5 mg  0.5 mg IntraVENous Q3H PRN Melissa Herrmann APRN - CNP   0.5 mg at 07/31/25 0027    acetaminophen (TYLENOL) tablet 650 mg  650 mg Oral Q6H PRN Lyn Kearns MD        [Held by provider] enoxaparin (LOVENOX) injection 40 mg  40 mg SubCUTAneous Daily Lyn Kearns MD   40 mg at 07/31/25 0940    aspirin chewable tablet 81 mg  81 mg Oral Daily Lyn Kearns MD   81 mg at 07/31/25 0940    cyclobenzaprine (FLEXERIL) tablet 10 mg  10 mg Oral BID Lyn Kearns MD   10 mg at 07/31/25 0940    pregabalin (LYRICA) capsule 100 mg  100 mg Oral BID Lyn Kearns MD   100 mg at 07/31/25 0940    [Held by provider] celecoxib (CELEBREX) capsule 100 mg  100 mg Oral BID Lyn Kearns MD   100 mg at 07/31/25 0940    anastrozole (ARIMIDEX) tablet 1 mg  1 mg Oral Daily Carlos Loera MD   1 mg at 07/31/25 0942    diphenhydrAMINE (BENADRYL) tablet 25 mg  25 mg Oral Q6H PRN Lyn Kearns MD           PHYSICAL EXAM:    EYES:  Lids and lashes normal, pupils equal, round and reactive to light, extra ocular muscles intact, sclera clear, conjunctiva normal    ENT:  Normocephalic, without obvious abnormality, atraumatic, sinuses nontender on palpation, external ears

## 2025-08-01 ENCOUNTER — APPOINTMENT (OUTPATIENT)
Dept: GENERAL RADIOLOGY | Age: 70
DRG: 166 | End: 2025-08-01
Attending: INTERNAL MEDICINE
Payer: COMMERCIAL

## 2025-08-01 ENCOUNTER — ANESTHESIA EVENT (OUTPATIENT)
Dept: OPERATING ROOM | Age: 70
End: 2025-08-01
Payer: COMMERCIAL

## 2025-08-01 ENCOUNTER — ANESTHESIA (OUTPATIENT)
Dept: OPERATING ROOM | Age: 70
End: 2025-08-01
Payer: COMMERCIAL

## 2025-08-01 VITALS
HEART RATE: 102 BPM | HEIGHT: 62 IN | SYSTOLIC BLOOD PRESSURE: 137 MMHG | BODY MASS INDEX: 32.76 KG/M2 | OXYGEN SATURATION: 94 % | TEMPERATURE: 97.9 F | DIASTOLIC BLOOD PRESSURE: 66 MMHG | RESPIRATION RATE: 16 BRPM | WEIGHT: 178 LBS

## 2025-08-01 LAB
ALBUMIN SERPL-MCNC: 4 G/DL (ref 3.5–4.6)
ANION GAP SERPL CALCULATED.3IONS-SCNC: 13 MEQ/L (ref 9–15)
BASOPHILS # BLD: 0 K/UL (ref 0–0.2)
BASOPHILS NFR BLD: 0.2 %
BUN SERPL-MCNC: 21 MG/DL (ref 8–23)
CALCIUM SERPL-MCNC: 9.4 MG/DL (ref 8.5–9.9)
CHLORIDE SERPL-SCNC: 105 MEQ/L (ref 95–107)
CO2 SERPL-SCNC: 23 MEQ/L (ref 20–31)
CREAT SERPL-MCNC: 0.66 MG/DL (ref 0.5–0.9)
EOSINOPHIL # BLD: 0 K/UL (ref 0–0.7)
EOSINOPHIL NFR BLD: 0.3 %
ERYTHROCYTE [DISTWIDTH] IN BLOOD BY AUTOMATED COUNT: 13.5 % (ref 11.5–14.5)
GLUCOSE SERPL-MCNC: 102 MG/DL (ref 70–99)
HCT VFR BLD AUTO: 34.7 % (ref 37–47)
HGB BLD-MCNC: 11.6 G/DL (ref 12–16)
LYMPHOCYTES # BLD: 1.4 K/UL (ref 1–4.8)
LYMPHOCYTES NFR BLD: 13.8 %
MAGNESIUM SERPL-MCNC: 2.1 MG/DL (ref 1.7–2.4)
MCH RBC QN AUTO: 30.7 PG (ref 27–31.3)
MCHC RBC AUTO-ENTMCNC: 33.4 % (ref 33–37)
MCV RBC AUTO: 91.8 FL (ref 79.4–94.8)
MONOCYTES # BLD: 0.9 K/UL (ref 0.2–0.8)
MONOCYTES NFR BLD: 8.4 %
NEUTROPHILS # BLD: 7.8 K/UL (ref 1.4–6.5)
NEUTS SEG NFR BLD: 76.9 %
PHOSPHATE SERPL-MCNC: 3.4 MG/DL (ref 2.3–4.8)
PLATELET # BLD AUTO: 299 K/UL (ref 130–400)
POTASSIUM SERPL-SCNC: 4 MEQ/L (ref 3.4–4.9)
RBC # BLD AUTO: 3.78 M/UL (ref 4.2–5.4)
SODIUM SERPL-SCNC: 141 MEQ/L (ref 135–144)
WBC # BLD AUTO: 10.2 K/UL (ref 4.8–10.8)

## 2025-08-01 PROCEDURE — 85025 COMPLETE CBC W/AUTO DIFF WBC: CPT

## 2025-08-01 PROCEDURE — 6360000002 HC RX W HCPCS

## 2025-08-01 PROCEDURE — 2500000003 HC RX 250 WO HCPCS: Performed by: INTERNAL MEDICINE

## 2025-08-01 PROCEDURE — 88341 IMHCHEM/IMCYTCHM EA ADD ANTB: CPT

## 2025-08-01 PROCEDURE — 2580000003 HC RX 258: Performed by: INTERNAL MEDICINE

## 2025-08-01 PROCEDURE — 31628 BRONCHOSCOPY/LUNG BX EACH: CPT | Performed by: INTERNAL MEDICINE

## 2025-08-01 PROCEDURE — 87102 FUNGUS ISOLATION CULTURE: CPT

## 2025-08-01 PROCEDURE — 88173 CYTOPATH EVAL FNA REPORT: CPT

## 2025-08-01 PROCEDURE — 6360000002 HC RX W HCPCS: Performed by: ANESTHESIOLOGIST ASSISTANT

## 2025-08-01 PROCEDURE — 83735 ASSAY OF MAGNESIUM: CPT

## 2025-08-01 PROCEDURE — 80069 RENAL FUNCTION PANEL: CPT

## 2025-08-01 PROCEDURE — 31624 DX BRONCHOSCOPE/LAVAGE: CPT | Performed by: INTERNAL MEDICINE

## 2025-08-01 PROCEDURE — 0BBJ8ZX EXCISION OF LEFT LOWER LUNG LOBE, VIA NATURAL OR ARTIFICIAL OPENING ENDOSCOPIC, DIAGNOSTIC: ICD-10-PCS | Performed by: INTERNAL MEDICINE

## 2025-08-01 PROCEDURE — 6370000000 HC RX 637 (ALT 250 FOR IP)

## 2025-08-01 PROCEDURE — 6370000000 HC RX 637 (ALT 250 FOR IP): Performed by: INTERNAL MEDICINE

## 2025-08-01 PROCEDURE — 0B9J8ZX DRAINAGE OF LEFT LOWER LUNG LOBE, VIA NATURAL OR ARTIFICIAL OPENING ENDOSCOPIC, DIAGNOSTIC: ICD-10-PCS | Performed by: INTERNAL MEDICINE

## 2025-08-01 PROCEDURE — 3700000001 HC ADD 15 MINUTES (ANESTHESIA): Performed by: INTERNAL MEDICINE

## 2025-08-01 PROCEDURE — 87116 MYCOBACTERIA CULTURE: CPT

## 2025-08-01 PROCEDURE — 89051 BODY FLUID CELL COUNT: CPT

## 2025-08-01 PROCEDURE — 71045 X-RAY EXAM CHEST 1 VIEW: CPT

## 2025-08-01 PROCEDURE — 88305 TISSUE EXAM BY PATHOLOGIST: CPT

## 2025-08-01 PROCEDURE — 6360000002 HC RX W HCPCS: Performed by: INTERNAL MEDICINE

## 2025-08-01 PROCEDURE — 7100000000 HC PACU RECOVERY - FIRST 15 MIN: Performed by: INTERNAL MEDICINE

## 2025-08-01 PROCEDURE — 87070 CULTURE OTHR SPECIMN AEROBIC: CPT

## 2025-08-01 PROCEDURE — 2709999900 HC NON-CHARGEABLE SUPPLY: Performed by: INTERNAL MEDICINE

## 2025-08-01 PROCEDURE — 31623 DX BRONCHOSCOPE/BRUSH: CPT | Performed by: INTERNAL MEDICINE

## 2025-08-01 PROCEDURE — 3603165200 HC BRNCHSC EBUS GUIDED SAMPL 1/2 NODE STATION/STRUX: Performed by: INTERNAL MEDICINE

## 2025-08-01 PROCEDURE — 3700000000 HC ANESTHESIA ATTENDED CARE: Performed by: INTERNAL MEDICINE

## 2025-08-01 PROCEDURE — 88342 IMHCHEM/IMCYTCHM 1ST ANTB: CPT

## 2025-08-01 PROCEDURE — 7100000001 HC PACU RECOVERY - ADDTL 15 MIN: Performed by: INTERNAL MEDICINE

## 2025-08-01 PROCEDURE — 36415 COLL VENOUS BLD VENIPUNCTURE: CPT

## 2025-08-01 PROCEDURE — 2500000003 HC RX 250 WO HCPCS: Performed by: ANESTHESIOLOGIST ASSISTANT

## 2025-08-01 RX ORDER — SODIUM CHLORIDE 0.9 % (FLUSH) 0.9 %
5-40 SYRINGE (ML) INJECTION PRN
Status: DISCONTINUED | OUTPATIENT
Start: 2025-08-01 | End: 2025-08-01 | Stop reason: HOSPADM

## 2025-08-01 RX ORDER — ROCURONIUM BROMIDE 10 MG/ML
INJECTION, SOLUTION INTRAVENOUS
Status: DISCONTINUED | OUTPATIENT
Start: 2025-08-01 | End: 2025-08-01 | Stop reason: SDUPTHER

## 2025-08-01 RX ORDER — OXYCODONE HYDROCHLORIDE 5 MG/1
10 TABLET ORAL PRN
Status: DISCONTINUED | OUTPATIENT
Start: 2025-08-01 | End: 2025-08-01 | Stop reason: HOSPADM

## 2025-08-01 RX ORDER — MEPERIDINE HYDROCHLORIDE 25 MG/ML
12.5 INJECTION INTRAMUSCULAR; INTRAVENOUS; SUBCUTANEOUS EVERY 5 MIN PRN
Status: DISCONTINUED | OUTPATIENT
Start: 2025-08-01 | End: 2025-08-01 | Stop reason: HOSPADM

## 2025-08-01 RX ORDER — SODIUM CHLORIDE 9 MG/ML
INJECTION, SOLUTION INTRAVENOUS PRN
Status: DISCONTINUED | OUTPATIENT
Start: 2025-08-01 | End: 2025-08-01 | Stop reason: HOSPADM

## 2025-08-01 RX ORDER — METOCLOPRAMIDE HYDROCHLORIDE 5 MG/ML
10 INJECTION INTRAMUSCULAR; INTRAVENOUS
Status: DISCONTINUED | OUTPATIENT
Start: 2025-08-01 | End: 2025-08-01 | Stop reason: HOSPADM

## 2025-08-01 RX ORDER — HYDRALAZINE HYDROCHLORIDE 20 MG/ML
10 INJECTION INTRAMUSCULAR; INTRAVENOUS
Status: DISCONTINUED | OUTPATIENT
Start: 2025-08-01 | End: 2025-08-01 | Stop reason: HOSPADM

## 2025-08-01 RX ORDER — LABETALOL HYDROCHLORIDE 5 MG/ML
10 INJECTION, SOLUTION INTRAVENOUS
Status: DISCONTINUED | OUTPATIENT
Start: 2025-08-01 | End: 2025-08-01 | Stop reason: HOSPADM

## 2025-08-01 RX ORDER — DEXTROSE MONOHYDRATE 100 MG/ML
INJECTION, SOLUTION INTRAVENOUS CONTINUOUS PRN
Status: DISCONTINUED | OUTPATIENT
Start: 2025-08-01 | End: 2025-08-01 | Stop reason: HOSPADM

## 2025-08-01 RX ORDER — FENTANYL CITRATE 0.05 MG/ML
25 INJECTION, SOLUTION INTRAMUSCULAR; INTRAVENOUS EVERY 5 MIN PRN
Status: DISCONTINUED | OUTPATIENT
Start: 2025-08-01 | End: 2025-08-01 | Stop reason: HOSPADM

## 2025-08-01 RX ORDER — ANASTROZOLE 1 MG/1
1 TABLET ORAL DAILY
Qty: 30 TABLET | Refills: 3 | Status: SHIPPED | OUTPATIENT
Start: 2025-08-02

## 2025-08-01 RX ORDER — OXYCODONE HYDROCHLORIDE 5 MG/1
5 TABLET ORAL PRN
Status: DISCONTINUED | OUTPATIENT
Start: 2025-08-01 | End: 2025-08-01 | Stop reason: HOSPADM

## 2025-08-01 RX ORDER — IPRATROPIUM BROMIDE AND ALBUTEROL SULFATE 2.5; .5 MG/3ML; MG/3ML
1 SOLUTION RESPIRATORY (INHALATION)
Status: DISCONTINUED | OUTPATIENT
Start: 2025-08-01 | End: 2025-08-01 | Stop reason: HOSPADM

## 2025-08-01 RX ORDER — DEXAMETHASONE SODIUM PHOSPHATE 10 MG/ML
INJECTION, SOLUTION INTRA-ARTICULAR; INTRALESIONAL; INTRAMUSCULAR; INTRAVENOUS; SOFT TISSUE
Status: DISCONTINUED | OUTPATIENT
Start: 2025-08-01 | End: 2025-08-01 | Stop reason: SDUPTHER

## 2025-08-01 RX ORDER — FENTANYL 12.5 UG/1
1 PATCH TRANSDERMAL
Qty: 5 PATCH | Refills: 0 | Status: SHIPPED | OUTPATIENT
Start: 2025-08-02 | End: 2025-08-17

## 2025-08-01 RX ORDER — LIDOCAINE HYDROCHLORIDE 20 MG/ML
INJECTION, SOLUTION EPIDURAL; INFILTRATION; INTRACAUDAL; PERINEURAL PRN
Status: DISCONTINUED | OUTPATIENT
Start: 2025-08-01 | End: 2025-08-01 | Stop reason: HOSPADM

## 2025-08-01 RX ORDER — FENTANYL CITRATE 50 UG/ML
INJECTION, SOLUTION INTRAMUSCULAR; INTRAVENOUS
Status: DISCONTINUED | OUTPATIENT
Start: 2025-08-01 | End: 2025-08-01 | Stop reason: SDUPTHER

## 2025-08-01 RX ORDER — LIDOCAINE HYDROCHLORIDE 20 MG/ML
INJECTION, SOLUTION EPIDURAL; INFILTRATION; INTRACAUDAL; PERINEURAL
Status: DISCONTINUED | OUTPATIENT
Start: 2025-08-01 | End: 2025-08-01 | Stop reason: SDUPTHER

## 2025-08-01 RX ORDER — OXYCODONE HYDROCHLORIDE 10 MG/1
10 TABLET ORAL EVERY 4 HOURS PRN
Qty: 84 TABLET | Refills: 0 | Status: SHIPPED | OUTPATIENT
Start: 2025-08-01 | End: 2025-08-15

## 2025-08-01 RX ORDER — PROPOFOL 10 MG/ML
INJECTION, EMULSION INTRAVENOUS
Status: DISCONTINUED | OUTPATIENT
Start: 2025-08-01 | End: 2025-08-01 | Stop reason: SDUPTHER

## 2025-08-01 RX ORDER — DEXAMETHASONE 4 MG/1
4 TABLET ORAL 2 TIMES DAILY WITH MEALS
Qty: 28 TABLET | Refills: 0 | Status: SHIPPED | OUTPATIENT
Start: 2025-08-01 | End: 2025-08-15

## 2025-08-01 RX ORDER — SODIUM CHLORIDE 0.9 % (FLUSH) 0.9 %
5-40 SYRINGE (ML) INJECTION EVERY 12 HOURS SCHEDULED
Status: DISCONTINUED | OUTPATIENT
Start: 2025-08-01 | End: 2025-08-01 | Stop reason: HOSPADM

## 2025-08-01 RX ORDER — CYCLOBENZAPRINE HCL 10 MG
10 TABLET ORAL 3 TIMES DAILY PRN
Qty: 42 TABLET | Refills: 0 | Status: SHIPPED | OUTPATIENT
Start: 2025-08-01 | End: 2025-08-15

## 2025-08-01 RX ORDER — MIDAZOLAM HYDROCHLORIDE 1 MG/ML
INJECTION, SOLUTION INTRAMUSCULAR; INTRAVENOUS
Status: DISCONTINUED | OUTPATIENT
Start: 2025-08-01 | End: 2025-08-01 | Stop reason: SDUPTHER

## 2025-08-01 RX ORDER — ONDANSETRON 2 MG/ML
INJECTION INTRAMUSCULAR; INTRAVENOUS
Status: DISCONTINUED | OUTPATIENT
Start: 2025-08-01 | End: 2025-08-01 | Stop reason: SDUPTHER

## 2025-08-01 RX ORDER — GLUCAGON 1 MG/ML
1 KIT INJECTION PRN
Status: DISCONTINUED | OUTPATIENT
Start: 2025-08-01 | End: 2025-08-01 | Stop reason: HOSPADM

## 2025-08-01 RX ORDER — MAGNESIUM HYDROXIDE 1200 MG/15ML
LIQUID ORAL CONTINUOUS PRN
Status: DISCONTINUED | OUTPATIENT
Start: 2025-08-01 | End: 2025-08-01 | Stop reason: HOSPADM

## 2025-08-01 RX ORDER — ONDANSETRON 2 MG/ML
4 INJECTION INTRAMUSCULAR; INTRAVENOUS
Status: DISCONTINUED | OUTPATIENT
Start: 2025-08-01 | End: 2025-08-01 | Stop reason: HOSPADM

## 2025-08-01 RX ADMIN — FENTANYL CITRATE 50 MCG: 50 INJECTION, SOLUTION INTRAMUSCULAR; INTRAVENOUS at 10:25

## 2025-08-01 RX ADMIN — FENTANYL CITRATE 50 MCG: 50 INJECTION, SOLUTION INTRAMUSCULAR; INTRAVENOUS at 10:02

## 2025-08-01 RX ADMIN — LIDOCAINE HYDROCHLORIDE 50 MG: 20 INJECTION, SOLUTION EPIDURAL; INFILTRATION; INTRACAUDAL; PERINEURAL at 10:12

## 2025-08-01 RX ADMIN — DEXAMETHASONE SODIUM PHOSPHATE 10 MG: 10 INJECTION INTRAMUSCULAR; INTRAVENOUS at 10:25

## 2025-08-01 RX ADMIN — CYCLOBENZAPRINE HYDROCHLORIDE 10 MG: 10 TABLET, FILM COATED ORAL at 13:00

## 2025-08-01 RX ADMIN — FENTANYL CITRATE 50 MCG: 50 INJECTION, SOLUTION INTRAMUSCULAR; INTRAVENOUS at 10:12

## 2025-08-01 RX ADMIN — PREGABALIN 100 MG: 50 CAPSULE ORAL at 13:00

## 2025-08-01 RX ADMIN — HYDROMORPHONE HYDROCHLORIDE 0.5 MG: 1 INJECTION, SOLUTION INTRAMUSCULAR; INTRAVENOUS; SUBCUTANEOUS at 13:06

## 2025-08-01 RX ADMIN — OXYCODONE 10 MG: 5 TABLET ORAL at 02:49

## 2025-08-01 RX ADMIN — ANASTROZOLE 1 MG: 1 TABLET, COATED ORAL at 13:00

## 2025-08-01 RX ADMIN — SODIUM CHLORIDE: 0.9 INJECTION, SOLUTION INTRAVENOUS at 08:54

## 2025-08-01 RX ADMIN — ASPIRIN 81 MG: 81 TABLET, CHEWABLE ORAL at 13:00

## 2025-08-01 RX ADMIN — FENTANYL CITRATE 50 MCG: 50 INJECTION, SOLUTION INTRAMUSCULAR; INTRAVENOUS at 10:23

## 2025-08-01 RX ADMIN — ROCURONIUM BROMIDE 70 MG: 10 INJECTION, SOLUTION INTRAVENOUS at 10:12

## 2025-08-01 RX ADMIN — PROPOFOL 160 MG: 10 INJECTION, EMULSION INTRAVENOUS at 10:12

## 2025-08-01 RX ADMIN — OXYCODONE 10 MG: 5 TABLET ORAL at 12:50

## 2025-08-01 RX ADMIN — ONDANSETRON 4 MG: 2 INJECTION, SOLUTION INTRAMUSCULAR; INTRAVENOUS at 10:47

## 2025-08-01 RX ADMIN — PROPOFOL 40 MG: 10 INJECTION, EMULSION INTRAVENOUS at 10:25

## 2025-08-01 RX ADMIN — SUGAMMADEX 400 MG: 100 INJECTION, SOLUTION INTRAVENOUS at 10:47

## 2025-08-01 RX ADMIN — MIDAZOLAM HYDROCHLORIDE 2 MG: 1 INJECTION, SOLUTION INTRAMUSCULAR; INTRAVENOUS at 10:02

## 2025-08-01 ASSESSMENT — PAIN DESCRIPTION - LOCATION
LOCATION: BACK;BUTTOCKS;LEG
LOCATION: BACK

## 2025-08-01 ASSESSMENT — PAIN - FUNCTIONAL ASSESSMENT: PAIN_FUNCTIONAL_ASSESSMENT: 0-10

## 2025-08-01 ASSESSMENT — PAIN SCALES - GENERAL
PAINLEVEL_OUTOF10: 6
PAINLEVEL_OUTOF10: 10
PAINLEVEL_OUTOF10: 0
PAINLEVEL_OUTOF10: 5

## 2025-08-01 ASSESSMENT — PAIN DESCRIPTION - DESCRIPTORS
DESCRIPTORS: ACHING;DISCOMFORT;SHOOTING;STABBING
DESCRIPTORS: DISCOMFORT

## 2025-08-01 ASSESSMENT — PAIN DESCRIPTION - ORIENTATION
ORIENTATION: RIGHT;LEFT
ORIENTATION: LOWER;LEFT

## 2025-08-01 NOTE — PLAN OF CARE
Problem: Chronic Conditions and Co-morbidities  Goal: Patient's chronic conditions and co-morbidity symptoms are monitored and maintained or improved  8/1/2025 1338 by Gabbi Copeland RN  Outcome: Progressing  8/1/2025 0019 by Janey Rodriguez, RN  Outcome: Progressing     Problem: Discharge Planning  Goal: Discharge to home or other facility with appropriate resources  8/1/2025 1338 by Gabbi Copeland RN  Outcome: Progressing  8/1/2025 0019 by Janey Rodriguez, RN  Outcome: Progressing     Problem: Pain  Goal: Verbalizes/displays adequate comfort level or baseline comfort level  8/1/2025 1338 by Gabbi Copeland RN  Outcome: Progressing  8/1/2025 0019 by Janey Rodriguez, RN  Outcome: Progressing      Suprapubic catheter changed, large amount of leakage around urinary catheter. Urinary catheter size verified 24fr with 10cc balloon, patient has had SP for number of years with established track. When catheter removed their was a large amount of purulent drainage that was seen at sp track. New SP catheter inserted without difficulty. Cinda De La Vega RN and Bernice Machuca RN assisted with procedure.

## 2025-08-01 NOTE — ANESTHESIA POSTPROCEDURE EVALUATION
Department of Anesthesiology  Postprocedure Note    Patient: Mari Goodrich  MRN: [id number]  YOB: 1955  Date of evaluation: 8/1/2025    Procedure Summary       Date: 08/01/25 Room / Location: [address]    Anesthesia Start: 1002 Anesthesia Stop:     Procedure: Bronchoscopy with transbronchial biopsy, brushing and washing, endobronchial ultrasound (Throat) Diagnosis:       Pneumonia of left lower lobe due to infectious organism      (Pneumonia of left lower lobe due to infectious organism [J18.9])    Surgeons: Jerome Bejarano MD Responsible Provider: Altaf Shirley MD    Anesthesia Type: general ASA Status: 3            Anesthesia Type: No value filed.    Morgan Phase I: Morgan Score: 10    Morgan Phase II:      Anesthesia Post Evaluation    Patient location during evaluation: bedside  Patient participation: complete - patient participated  Level of consciousness: awake and awake and alert  Airway patency: patent  Nausea & Vomiting: no nausea and no vomiting  Cardiovascular status: blood pressure returned to baseline and hemodynamically stable  Respiratory status: acceptable  Hydration status: euvolemic  Pain management: adequate        No notable events documented.

## 2025-08-01 NOTE — PROGRESS NOTES
1205 Perfect served Dr. Scott about patient flagging for TB isolation. Per Dr. Scott patient does not need isolation. Informed [facility] of this.     1300 Patient arrived to the floor in 10 out of 10 pain. Medicated per ARMANDO Steele from [facility] on unit. Ordered one time dose of dilaudid to help patient with pain.

## 2025-08-01 NOTE — OP NOTE
Operative Note      Patient: Mari Goodrich  YOB: 1955  MRN: [id number]    Date of Procedure: 8/1/2025    Pre-Op Diagnosis Codes:      * Pneumonia of left lower lobe due to infectious organism [J18.9]    Post-Op Diagnosis: Same       Procedure(s):  Bronchoscopy with transbronchial biopsy, brushing and washing, endobronchial ultrasound    Surgeon(s):  Jerome Bejarano MD    Assistant:   * No surgical staff found *    Anesthesia: General    Estimated Blood Loss (mL): Minimal    Complications: None    Specimens:   ID Type Source Tests Collected by Time Destination   1 : BRONCHOALVEOLAR LAVAGE (BAL) LEFT LOWER LOBE Body Fluid Lung CELL COUNT WITH DIFFERENTIAL, BODY FLUID, CULTURE, FUNGUS, GRAM STAIN, CULTURE WITH SMEAR, ACID FAST BACILLIUS, CULTURE, RESPIRATORY (WITH GRAM STAIN) Jerome Bejarano MD 8/1/2025 1025    A : 50/50 BAL OF LEFT LOWER LOBE Body Fluid Lung CYTOLOGY, NON-GYN Jerome Bejarano MD 8/1/2025 1025    B : BRUSHING LEFT LOWER LOBE Tissue Lung CYTOLOGY, NON-GYN Jerome Bejarano MD 8/1/2025 1025    C : BIOPSY LEFT LOWER LOBE Tissue Lung SURGICAL PATHOLOGY Jerome Bejarano MD 8/1/2025 1025        Implants:  * No implants in log *      Drains: * No LDAs found *    Findings:  Present At Time Of Surgery (PATOS) (choose all levels that have infection present):  No infection present  Other Findings:      Detailed Description of Procedure:   PROCEDURE:  Fiberoptic bronchoscopy with     Bronchoscopy, Diagnostic  Bronchoalveolar lavage, BAL left lower lobe  Brush biopsy left lower lobe  Transbronchial biopsy left lower lobe          DESCRIPTION OF PROCEDURE:        fiberoptic Olympus bronchoscope was inserted via ET tube to the tracheobronchial tree, and visual airway inspection of the right upper lobe, right middle lobe, right lower lobe, left upper lobe, lingula, and left lower lobe was performed.   There were no endobronchial lesions, mucopurulent secretions or active bleeding. Anatomy was normal

## 2025-08-01 NOTE — PLAN OF CARE
Problem: Chronic Conditions and Co-morbidities  Goal: Patient's chronic conditions and co-morbidity symptoms are monitored and maintained or improved  8/1/2025 0019 by Janey Rodriguez RN  Outcome: Progressing  7/31/2025 1059 by Gabbi Copeland, RN  Outcome: Progressing     Problem: Discharge Planning  Goal: Discharge to home or other facility with appropriate resources  8/1/2025 0019 by Janey Rodriguez, RN  Outcome: Progressing  7/31/2025 1059 by Gabbi Copeland, RN  Outcome: Progressing     Problem: Pain  Goal: Verbalizes/displays adequate comfort level or baseline comfort level  8/1/2025 0019 by Janey Rodriguez, RN  Outcome: Progressing  7/31/2025 1059 by Gabbi Copeland, RN  Outcome: Progressing

## 2025-08-01 NOTE — ANESTHESIA PRE PROCEDURE
Department of Anesthesiology  Preprocedure Note       Name:  Mari Goodrich   Age:  70 y.o.  :  1955                                          MRN:  [id number]         Date:  2025      Surgeon: Surgeon(s):  Jerome Bejarano MD    Procedure: Procedure(s):  Bronchoscopy with transbronchial biopsy, brushing and washing, endobronchial ultrasound transbronchial needle aspiration   Room 480    Medications prior to admission:   Prior to Admission medications    Medication Sig Start Date End Date Taking? Authorizing Provider   cefUROXime (CEFTIN) 500 MG tablet Take 1 tablet by mouth 2 times daily for 10 days 25 Yes Bill Garcia MD   doxycycline hyclate (VIBRA-TABS) 100 MG tablet Take 1 tablet by mouth 2 times daily for 10 days 25 Yes Bill Garcia MD   cyclobenzaprine (FLEXERIL) 10 MG tablet Take 1 tablet by mouth 3 times daily as needed for Muscle spasms   Yes ProviderAditya MD   pregabalin (LYRICA) 100 MG capsule Take 1 capsule by mouth 3 times daily for 30 days. Max Daily Amount: 300 mg 25 Yes Jaleel Yi, DO   Multiple Vitamins-Minerals (THERAPEUTIC MULTIVITAMIN-MINERALS) tablet Take 1 tablet by mouth daily   Yes ProviderAditya MD   Magnesium 400 MG CAPS Take 1 capsule by mouth daily   Yes ProviderAditya MD   losartan (COZAAR) 100 MG tablet Take 1 tablet by mouth daily 25  Yes Elvis Moore MD   aspirin 81 MG chewable tablet Take 1 tablet by mouth daily   Yes Aditya Santiago MD   ketorolac (TORADOL) 10 MG tablet Take 1 tablet by mouth every 6 hours as needed for Pain 25   Hans Whiting MD   meclizine (ANTIVERT) 25 MG tablet take 1 tablet by mouth three times a day if needed for dizziness 25   Elvis Moore MD       Current medications:    Current Facility-Administered Medications   Medication Dose Route Frequency Provider Last Rate Last Admin   • sodium chloride flush 0.9 % injection 5-40 mL  5-40 mL IntraVENous 2 times

## 2025-08-01 NOTE — PROGRESS NOTES
Hospitalist Progress Note      PCP: Elvis Moore MD    Date of Admission: 7/29/2025    Chief Complaint:  no acute events, is afebrile, stable HD, on RA, is scheduled for bronchoscopy today per pulmonology    Medications:  Reviewed    Infusion Medications    sodium chloride 100 mL/hr at 08/01/25 0854     Scheduled Medications    sodium chloride flush  5-40 mL IntraVENous 2 times per day    fentaNYL  1 patch TransDERmal Q72H    [Held by provider] enoxaparin  40 mg SubCUTAneous Daily    aspirin  81 mg Oral Daily    cyclobenzaprine  10 mg Oral BID    pregabalin  100 mg Oral BID    [Held by provider] celecoxib  100 mg Oral BID    anastrozole  1 mg Oral Daily     PRN Meds: sodium chloride flush, sodium chloride, oxyCODONE, HYDROmorphone, acetaminophen, diphenhydrAMINE      Intake/Output Summary (Last 24 hours) at 8/1/2025 1022  Last data filed at 7/31/2025 1832  Gross per 24 hour   Intake 480 ml   Output --   Net 480 ml       Exam:    BP (!) 163/74   Pulse (!) 109   Temp 98.4 °F (36.9 °C) (Temporal)   Resp 18   Ht 1.575 m (5' 2\")   Wt 80.7 kg (178 lb)   SpO2 95%   BMI 32.56 kg/m²     General appearance: awake, cooperative.  Respiratory:  CTA bilaterally.  Cardiovascular: Regular rate and rhythm, S1/S2.  Abdomen: Soft   Musculoskeletal: No edema bilaterally.         Labs:   Recent Labs     07/30/25  0616 07/31/25  0521 08/01/25  0507   WBC 4.7* 8.2 10.2   HGB 11.3* 10.9* 11.6*   HCT 34.2* 32.8* 34.7*    285 299     Recent Labs     07/30/25  0616 07/31/25  0521 08/01/25  0507    140 141   K 4.5 4.1 4.0    105 105   CO2 24 25 23   BUN 15 15 21   CREATININE 0.54 0.57 0.66   CALCIUM 9.4 9.2 9.4   PHOS  --  3.7 3.4     No results for input(s): \"AST\", \"ALT\", \"BILIDIR\", \"BILITOT\", \"ALKPHOS\" in the last 72 hours.    Recent Labs     07/31/25  1018   INR 1.0     No results for input(s): \"CKTOTAL\", \"TROPONINI\" in the last 72 hours.    Urinalysis:      Lab Results   Component Value Date/Time    NITRU

## 2025-08-01 NOTE — DISCHARGE SUMMARY
Hospital Medicine Discharge Summary    Mari Goodrich  :  1955  MRN:  [id number]    Admit date:  2025  Discharge date:  2025    Admitting Physician:  Marybeth Ball MD  Primary Care Physician:  Elvis Moore MD      Discharge Diagnoses:    Principal Problem:    Cancer uncertain whether primary or metastatic (HCC)  Active Problems:    Pain of metastatic malignancy    Pneumonia of left lower lobe due to infectious organism    Back pain at L4-L5 level    Palliative care encounter    Goals of care, counseling/discussion    Advanced care planning/counseling discussion    Cancer related pain    Lung mass  Resolved Problems:    * No resolved hospital problems. *      Hospital Course:   Mari Goodrich is a 70 y.o. female that was admitted and treated at [facility] for the following medical issues:     Acute / chronic back pain  - MRI of the thoracic and lumbar spine showed multiple metastatic lesions per report  - likely related to breast cancer per oncology  - started on Arimidex   - started on Oxycodone IR and Fentanyl patch for pain control  - followed by palliative care    LLL consolidation  - persistent since  on CT chest  - denies respiratory symptoms  - treated with IV Rocephin and Zithromax empirically  - s/p bronchoscopy on   - followed by pulmonology       Disposition - home        Patient was seen by the following consultants while admitted to [facility]:   Consults:  IP CONSULT TO HEM/ONC  IP CONSULT TO PULMONOLOGY  IP CONSULT TO PALLIATIVE CARE    Significant Diagnostic Studies:    MRI THORACIC SPINE WO CONTRAST  Result Date: 2025  EXAM: MRI THORACIC SPINE WITHOUT INTRAVENOUS CONTRAST 2025 06:37:42 PM TECHNIQUE: Multiplanar multisequence MRI of the thoracic spine was performed without the administration of intravenous contrast. COMPARISON: None available. CLINICAL HISTORY: Lytic lesion with pain. FINDINGS: BONES AND ALIGNMENT: Metastatic lesions

## 2025-08-01 NOTE — PROGRESS NOTES
CLINICAL PHARMACY NOTE: MEDS TO BEDS    Total # of Prescriptions Filled: 0   The following medications were delivered to the patient:    Anastrozole too soon... Patient  just picked up prescriptions 7-31-25... Putting this prescription on hold...    Additional Documentation:

## 2025-08-02 LAB
ALBUMIN SERPL-MCNC: 4.03 G/DL (ref 3.75–5.01)
ALPHA1 GLOB SERPL ELPH-MCNC: 0.36 G/DL (ref 0.19–0.46)
ALPHA2 GLOB SERPL ELPH-MCNC: 0.83 G/DL (ref 0.48–1.05)
B-GLOBULIN SERPL ELPH-MCNC: 0.92 G/DL (ref 0.48–1.1)
GAMMA GLOB SERPL ELPH-MCNC: 0.86 G/DL (ref 0.62–1.51)
INTERPRETATION SERPL IFE-IMP: NORMAL
M PROTEIN SERPL ELPH-MCNC: NORMAL G/DL
PROT SERPL-MCNC: 7 G/DL (ref 6.3–8.2)
PROTEIN ELECTROPHORESIS, SERUM: NORMAL

## 2025-08-03 LAB
AFB STAIN: NORMAL
BACTERIA BLD CULT ORG #2: NORMAL
BACTERIA BLD CULT ORG #2: NORMAL
BACTERIA BLD CULT: NORMAL
BACTERIA BLD CULT: NORMAL
BACTERIA SPEC RESP CULT: NORMAL
PRELIMINARY: NORMAL
PRELIMINARY: NORMAL
PTH RELATED PROT SERPL-SCNC: 2.6 PMOL/L (ref 0–3.4)

## 2025-08-04 ENCOUNTER — TELEPHONE (OUTPATIENT)
Dept: FAMILY MEDICINE CLINIC | Age: 70
End: 2025-08-04

## 2025-08-04 ENCOUNTER — TELEPHONE (OUTPATIENT)
Age: 70
End: 2025-08-04

## 2025-08-04 DIAGNOSIS — D49.2: Primary | ICD-10-CM

## 2025-08-04 LAB
AFB STAIN: NORMAL
APPEARANCE FLUID: NORMAL
CELL COUNT FLUID TYPE: NORMAL
CLOT EVALUATION: NORMAL
COLOR FLUID: NORMAL
LYMPHOCYTES, BODY FLUID: 21 %
MACROPHAGE FLUID: 40 %
NEUTROPHIL, FLUID: 39 %
NUCLEATED CELLS FLUID: 22 /CUMM
NUMBER OF CELLS COUNTED FLUID: 100
PRELIMINARY: NORMAL
PRELIMINARY: NORMAL
RBC FLUID: NORMAL /CUMM

## 2025-08-04 RX ORDER — DIAZEPAM 5 MG/1
TABLET ORAL
Qty: 1 TABLET | Refills: 0 | Status: SHIPPED | OUTPATIENT
Start: 2025-08-04 | End: 2025-08-07

## 2025-08-06 DIAGNOSIS — C34.32 MALIGNANT NEOPLASM OF LOWER LOBE OF LEFT LUNG (HCC): Primary | ICD-10-CM

## 2025-08-08 ENCOUNTER — OFFICE VISIT (OUTPATIENT)
Dept: INTERNAL MEDICINE | Age: 70
End: 2025-08-08

## 2025-08-08 VITALS
BODY MASS INDEX: 33.34 KG/M2 | TEMPERATURE: 98.5 F | SYSTOLIC BLOOD PRESSURE: 132 MMHG | HEART RATE: 87 BPM | RESPIRATION RATE: 16 BRPM | OXYGEN SATURATION: 95 % | DIASTOLIC BLOOD PRESSURE: 68 MMHG | WEIGHT: 181.2 LBS | HEIGHT: 62 IN

## 2025-08-08 DIAGNOSIS — G89.3 CANCER RELATED PAIN: ICD-10-CM

## 2025-08-08 DIAGNOSIS — M50.30 DEGENERATIVE DISC DISEASE, CERVICAL: ICD-10-CM

## 2025-08-08 DIAGNOSIS — M54.16 LUMBAR RADICULITIS: ICD-10-CM

## 2025-08-08 DIAGNOSIS — M54.59 INTRACTABLE LOW BACK PAIN: ICD-10-CM

## 2025-08-08 DIAGNOSIS — Z09 HOSPITAL DISCHARGE FOLLOW-UP: ICD-10-CM

## 2025-08-08 DIAGNOSIS — M53.3 SACRAL MASS: ICD-10-CM

## 2025-08-08 DIAGNOSIS — R73.03 PREDIABETES: Primary | ICD-10-CM

## 2025-08-08 DIAGNOSIS — M54.50 BACK PAIN AT L4-L5 LEVEL: ICD-10-CM

## 2025-08-08 LAB — HBA1C MFR BLD: 5.6 %

## 2025-08-12 ENCOUNTER — OFFICE VISIT (OUTPATIENT)
Dept: PALLATIVE CARE | Age: 70
End: 2025-08-12
Payer: COMMERCIAL

## 2025-08-12 VITALS — DIASTOLIC BLOOD PRESSURE: 61 MMHG | OXYGEN SATURATION: 96 % | HEART RATE: 76 BPM | SYSTOLIC BLOOD PRESSURE: 132 MMHG

## 2025-08-12 DIAGNOSIS — G89.3 CANCER RELATED PAIN: ICD-10-CM

## 2025-08-12 DIAGNOSIS — R26.81 GAIT INSTABILITY: ICD-10-CM

## 2025-08-12 DIAGNOSIS — Z71.89 GOALS OF CARE, COUNSELING/DISCUSSION: ICD-10-CM

## 2025-08-12 DIAGNOSIS — K59.03 OPIOID-INDUCED CONSTIPATION: ICD-10-CM

## 2025-08-12 DIAGNOSIS — D49.2: ICD-10-CM

## 2025-08-12 DIAGNOSIS — T40.2X5A OPIOID-INDUCED CONSTIPATION: ICD-10-CM

## 2025-08-12 DIAGNOSIS — C80.1 ADENOCARCINOMA (HCC): Primary | ICD-10-CM

## 2025-08-12 DIAGNOSIS — Z71.89 ADVANCED CARE PLANNING/COUNSELING DISCUSSION: ICD-10-CM

## 2025-08-12 DIAGNOSIS — M53.3 SACRAL MASS: ICD-10-CM

## 2025-08-12 DIAGNOSIS — Z51.5 PALLIATIVE CARE ENCOUNTER: ICD-10-CM

## 2025-08-12 PROCEDURE — 3078F DIAST BP <80 MM HG: CPT

## 2025-08-12 PROCEDURE — 1160F RVW MEDS BY RX/DR IN RCRD: CPT

## 2025-08-12 PROCEDURE — 99214 OFFICE O/P EST MOD 30 MIN: CPT

## 2025-08-12 PROCEDURE — 1159F MED LIST DOCD IN RCRD: CPT

## 2025-08-12 PROCEDURE — 1123F ACP DISCUSS/DSCN MKR DOCD: CPT

## 2025-08-12 PROCEDURE — 3075F SYST BP GE 130 - 139MM HG: CPT

## 2025-08-12 RX ORDER — SENNOSIDES 8.6 MG/1
1 TABLET ORAL 2 TIMES DAILY
Qty: 60 TABLET | Refills: 11 | Status: SHIPPED | OUTPATIENT
Start: 2025-08-12 | End: 2026-08-12

## 2025-08-12 RX ORDER — OXYCODONE HYDROCHLORIDE 10 MG/1
10 TABLET ORAL EVERY 4 HOURS PRN
Qty: 120 TABLET | Refills: 0 | Status: SHIPPED | OUTPATIENT
Start: 2025-08-12 | End: 2025-09-11

## 2025-08-12 RX ORDER — FENTANYL 25 UG/1
1 PATCH TRANSDERMAL
Qty: 10 PATCH | Refills: 0 | Status: SHIPPED | OUTPATIENT
Start: 2025-08-12 | End: 2025-09-11

## 2025-08-12 RX ORDER — DEXAMETHASONE 4 MG/1
4 TABLET ORAL 2 TIMES DAILY WITH MEALS
Qty: 28 TABLET | Refills: 0 | Status: SHIPPED | OUTPATIENT
Start: 2025-08-12 | End: 2025-08-26

## 2025-08-12 ASSESSMENT — ENCOUNTER SYMPTOMS
CONSTIPATION: 1
VOMITING: 0
COUGH: 0
NAUSEA: 0
DIARRHEA: 0
SHORTNESS OF BREATH: 0
TROUBLE SWALLOWING: 0
VOICE CHANGE: 0
BACK PAIN: 1
COLOR CHANGE: 0

## 2025-08-13 ENCOUNTER — HOSPITAL ENCOUNTER (OUTPATIENT)
Dept: CT IMAGING | Age: 70
Discharge: HOME | End: 2025-08-15
Payer: COMMERCIAL

## 2025-08-13 ENCOUNTER — HOSPITAL ENCOUNTER (OUTPATIENT)
Dept: RADIATION ONCOLOGY | Age: 70
Discharge: HOME | End: 2025-08-13
Payer: COMMERCIAL

## 2025-08-13 VITALS
TEMPERATURE: 97.7 F | WEIGHT: 180.4 LBS | HEIGHT: 62 IN | DIASTOLIC BLOOD PRESSURE: 56 MMHG | SYSTOLIC BLOOD PRESSURE: 110 MMHG | HEART RATE: 81 BPM | OXYGEN SATURATION: 94 % | RESPIRATION RATE: 16 BRPM | BODY MASS INDEX: 33.2 KG/M2

## 2025-08-13 DIAGNOSIS — C34.82 MALIGNANT NEOPLASM OF OVERLAPPING SITES OF LEFT LUNG (HCC): ICD-10-CM

## 2025-08-13 DIAGNOSIS — C79.52 SECONDARY MALIGNANT NEOPLASM OF BONE AND BONE MARROW (HCC): ICD-10-CM

## 2025-08-13 DIAGNOSIS — C80.1: Primary | ICD-10-CM

## 2025-08-13 DIAGNOSIS — C79.51 SECONDARY MALIGNANT NEOPLASM OF BONE AND BONE MARROW (HCC): ICD-10-CM

## 2025-08-13 PROCEDURE — 78815 PET IMAGE W/CT SKULL-THIGH: CPT

## 2025-08-13 PROCEDURE — 99212 OFFICE O/P EST SF 10 MIN: CPT | Performed by: RADIOLOGY

## 2025-08-13 PROCEDURE — A9609 HC RX DIAGNOSTIC RADIOPHARMACEUTICAL: HCPCS | Performed by: INTERNAL MEDICINE

## 2025-08-13 PROCEDURE — 3430000000 HC RX DIAGNOSTIC RADIOPHARMACEUTICAL: Performed by: INTERNAL MEDICINE

## 2025-08-13 RX ORDER — FLUDEOXYGLUCOSE F 18 200 MCI/ML
16.6 INJECTION, SOLUTION INTRAVENOUS
Status: COMPLETED | OUTPATIENT
Start: 2025-08-13 | End: 2025-08-13

## 2025-08-13 RX ADMIN — FLUDEOXYGLUCOSE F 18 16.6 MILLICURIE: 200 INJECTION, SOLUTION INTRAVENOUS at 18:28

## 2025-08-15 ENCOUNTER — HOSPITAL ENCOUNTER (OUTPATIENT)
Dept: RADIATION ONCOLOGY | Age: 70
Discharge: HOME | End: 2025-08-15
Payer: COMMERCIAL

## 2025-08-15 ENCOUNTER — OFFICE VISIT (OUTPATIENT)
Age: 70
End: 2025-08-15
Payer: COMMERCIAL

## 2025-08-15 VITALS
SYSTOLIC BLOOD PRESSURE: 136 MMHG | DIASTOLIC BLOOD PRESSURE: 80 MMHG | BODY MASS INDEX: 33.65 KG/M2 | OXYGEN SATURATION: 97 % | HEART RATE: 82 BPM | WEIGHT: 184 LBS

## 2025-08-15 DIAGNOSIS — C34.32 MALIGNANT NEOPLASM OF LOWER LOBE OF LEFT LUNG (HCC): Primary | ICD-10-CM

## 2025-08-15 DIAGNOSIS — R91.8 LUNG MASS: Primary | ICD-10-CM

## 2025-08-15 DIAGNOSIS — M53.3 SACRAL MASS: ICD-10-CM

## 2025-08-15 DIAGNOSIS — G89.3 PAIN OF METASTATIC MALIGNANCY: ICD-10-CM

## 2025-08-15 PROCEDURE — 3075F SYST BP GE 130 - 139MM HG: CPT | Performed by: INTERNAL MEDICINE

## 2025-08-15 PROCEDURE — 99214 OFFICE O/P EST MOD 30 MIN: CPT | Performed by: INTERNAL MEDICINE

## 2025-08-15 PROCEDURE — 77290 THER RAD SIMULAJ FIELD CPLX: CPT | Performed by: RADIOLOGY

## 2025-08-15 PROCEDURE — 77334 RADIATION TREATMENT AID(S): CPT | Performed by: RADIOLOGY

## 2025-08-15 PROCEDURE — 1123F ACP DISCUSS/DSCN MKR DOCD: CPT | Performed by: INTERNAL MEDICINE

## 2025-08-15 PROCEDURE — 3079F DIAST BP 80-89 MM HG: CPT | Performed by: INTERNAL MEDICINE

## 2025-08-16 LAB — PRELIMINARY: NORMAL

## 2025-08-19 ENCOUNTER — HOSPITAL ENCOUNTER (OUTPATIENT)
Dept: RADIATION ONCOLOGY | Age: 70
Discharge: HOME | End: 2025-08-19
Payer: COMMERCIAL

## 2025-08-19 DIAGNOSIS — C34.82 MALIGNANT NEOPLASM OF OVERLAPPING SITES OF LEFT LUNG (HCC): Primary | ICD-10-CM

## 2025-08-19 PROCEDURE — 77280 THER RAD SIMULAJ FIELD SMPL: CPT | Performed by: RADIOLOGY

## 2025-08-20 ENCOUNTER — HOSPITAL ENCOUNTER (OUTPATIENT)
Dept: RADIATION ONCOLOGY | Age: 70
Discharge: HOME | End: 2025-08-20
Payer: COMMERCIAL

## 2025-08-20 ENCOUNTER — TELEPHONE (OUTPATIENT)
Dept: INTERNAL MEDICINE | Age: 70
End: 2025-08-20

## 2025-08-20 VITALS
HEART RATE: 77 BPM | DIASTOLIC BLOOD PRESSURE: 57 MMHG | BODY MASS INDEX: 33.73 KG/M2 | WEIGHT: 184.4 LBS | OXYGEN SATURATION: 98 % | TEMPERATURE: 97.5 F | SYSTOLIC BLOOD PRESSURE: 120 MMHG | RESPIRATION RATE: 16 BRPM

## 2025-08-20 DIAGNOSIS — C34.82 MALIGNANT NEOPLASM OF OVERLAPPING SITES OF LEFT LUNG (HCC): Primary | ICD-10-CM

## 2025-08-20 PROCEDURE — 77387 GUIDANCE FOR RADJ TX DLVR: CPT | Performed by: RADIOLOGY

## 2025-08-20 PROCEDURE — G6002 STEREOSCOPIC X-RAY GUIDANCE: HCPCS | Performed by: RADIOLOGY

## 2025-08-20 PROCEDURE — 77412 RADIATION TX DELIVERY LVL 3: CPT | Performed by: RADIOLOGY

## 2025-08-20 RX ORDER — FOLIC ACID 1 MG/1
1 TABLET ORAL DAILY
COMMUNITY

## 2025-08-21 ENCOUNTER — HOSPITAL ENCOUNTER (OUTPATIENT)
Dept: RADIATION ONCOLOGY | Age: 70
Discharge: HOME | End: 2025-08-21
Payer: COMMERCIAL

## 2025-08-21 PROCEDURE — 77387 GUIDANCE FOR RADJ TX DLVR: CPT | Performed by: RADIOLOGY

## 2025-08-21 PROCEDURE — 77412 RADIATION TX DELIVERY LVL 3: CPT | Performed by: RADIOLOGY

## 2025-08-22 ENCOUNTER — HOSPITAL ENCOUNTER (OUTPATIENT)
Dept: RADIATION ONCOLOGY | Age: 70
Discharge: HOME | End: 2025-08-22
Payer: COMMERCIAL

## 2025-08-22 PROCEDURE — 77387 GUIDANCE FOR RADJ TX DLVR: CPT | Performed by: RADIOLOGY

## 2025-08-22 PROCEDURE — 77412 RADIATION TX DELIVERY LVL 3: CPT | Performed by: RADIOLOGY

## 2025-08-23 ENCOUNTER — APPOINTMENT (OUTPATIENT)
Dept: GENERAL RADIOLOGY | Age: 70
End: 2025-08-23
Payer: COMMERCIAL

## 2025-08-23 ENCOUNTER — HOSPITAL ENCOUNTER (EMERGENCY)
Age: 70
Discharge: HOME | End: 2025-08-23
Attending: EMERGENCY MEDICINE
Payer: COMMERCIAL

## 2025-08-23 VITALS
TEMPERATURE: 98 F | OXYGEN SATURATION: 97 % | HEIGHT: 62 IN | WEIGHT: 184 LBS | DIASTOLIC BLOOD PRESSURE: 73 MMHG | RESPIRATION RATE: 18 BRPM | BODY MASS INDEX: 33.86 KG/M2 | SYSTOLIC BLOOD PRESSURE: 136 MMHG | HEART RATE: 90 BPM

## 2025-08-23 DIAGNOSIS — G89.3 CANCER RELATED PAIN: ICD-10-CM

## 2025-08-23 DIAGNOSIS — K59.03 DRUG-INDUCED CONSTIPATION: Primary | ICD-10-CM

## 2025-08-23 DIAGNOSIS — M54.50 ACUTE EXACERBATION OF CHRONIC LOW BACK PAIN: ICD-10-CM

## 2025-08-23 DIAGNOSIS — G89.29 ACUTE EXACERBATION OF CHRONIC LOW BACK PAIN: ICD-10-CM

## 2025-08-23 PROCEDURE — 96376 TX/PRO/DX INJ SAME DRUG ADON: CPT

## 2025-08-23 PROCEDURE — 6360000002 HC RX W HCPCS

## 2025-08-23 PROCEDURE — 6370000000 HC RX 637 (ALT 250 FOR IP)

## 2025-08-23 PROCEDURE — 71046 X-RAY EXAM CHEST 2 VIEWS: CPT

## 2025-08-23 PROCEDURE — 99284 EMERGENCY DEPT VISIT MOD MDM: CPT

## 2025-08-23 PROCEDURE — 73502 X-RAY EXAM HIP UNI 2-3 VIEWS: CPT

## 2025-08-23 PROCEDURE — 96374 THER/PROPH/DIAG INJ IV PUSH: CPT

## 2025-08-23 RX ORDER — HYDROMORPHONE HYDROCHLORIDE 1 MG/ML
0.5 INJECTION, SOLUTION INTRAMUSCULAR; INTRAVENOUS; SUBCUTANEOUS ONCE
Status: COMPLETED | OUTPATIENT
Start: 2025-08-23 | End: 2025-08-23

## 2025-08-23 RX ADMIN — HYDROMORPHONE HYDROCHLORIDE 0.5 MG: 1 INJECTION, SOLUTION INTRAMUSCULAR; INTRAVENOUS; SUBCUTANEOUS at 19:58

## 2025-08-23 RX ADMIN — NALOXEGOL OXALATE 12.5 MG: 12.5 TABLET, FILM COATED ORAL at 22:31

## 2025-08-23 RX ADMIN — HYDROMORPHONE HYDROCHLORIDE 0.5 MG: 1 INJECTION, SOLUTION INTRAMUSCULAR; INTRAVENOUS; SUBCUTANEOUS at 22:01

## 2025-08-23 ASSESSMENT — ENCOUNTER SYMPTOMS
SHORTNESS OF BREATH: 0
NAUSEA: 0
COUGH: 0
VOICE CHANGE: 0
TROUBLE SWALLOWING: 0
BACK PAIN: 1
COLOR CHANGE: 0
VOMITING: 0

## 2025-08-23 ASSESSMENT — PAIN DESCRIPTION - DESCRIPTORS
DESCRIPTORS: SORE
DESCRIPTORS: SORE
DESCRIPTORS: SORE;DISCOMFORT
DESCRIPTORS: ACHING;SHARP

## 2025-08-23 ASSESSMENT — PAIN DESCRIPTION - LOCATION
LOCATION: BACK;SHOULDER
LOCATION: SHOULDER;HIP
LOCATION: BACK;SHOULDER
LOCATION: BACK;SHOULDER

## 2025-08-23 ASSESSMENT — PAIN SCALES - GENERAL
PAINLEVEL_OUTOF10: 8
PAINLEVEL_OUTOF10: 5
PAINLEVEL_OUTOF10: 8
PAINLEVEL_OUTOF10: 8

## 2025-08-23 ASSESSMENT — PAIN - FUNCTIONAL ASSESSMENT
PAIN_FUNCTIONAL_ASSESSMENT: ACTIVITIES ARE NOT PREVENTED
PAIN_FUNCTIONAL_ASSESSMENT: PREVENTS OR INTERFERES SOME ACTIVE ACTIVITIES AND ADLS
PAIN_FUNCTIONAL_ASSESSMENT: 0-10
PAIN_FUNCTIONAL_ASSESSMENT: PREVENTS OR INTERFERES SOME ACTIVE ACTIVITIES AND ADLS

## 2025-08-23 ASSESSMENT — PAIN DESCRIPTION - PAIN TYPE
TYPE: ACUTE PAIN
TYPE: CHRONIC PAIN

## 2025-08-23 ASSESSMENT — PAIN DESCRIPTION - FREQUENCY: FREQUENCY: CONTINUOUS

## 2025-08-23 ASSESSMENT — PAIN DESCRIPTION - ORIENTATION
ORIENTATION: LEFT
ORIENTATION: LEFT;LOWER
ORIENTATION: LEFT

## 2025-08-25 ENCOUNTER — HOSPITAL ENCOUNTER (OUTPATIENT)
Dept: RADIATION ONCOLOGY | Age: 70
Discharge: HOME | End: 2025-08-25
Payer: COMMERCIAL

## 2025-08-25 PROCEDURE — G6002 STEREOSCOPIC X-RAY GUIDANCE: HCPCS | Performed by: RADIOLOGY

## 2025-08-25 PROCEDURE — 77412 RADIATION TX DELIVERY LVL 3: CPT | Performed by: RADIOLOGY

## 2025-08-25 PROCEDURE — 77387 GUIDANCE FOR RADJ TX DLVR: CPT | Performed by: RADIOLOGY

## 2025-08-26 ENCOUNTER — HOSPITAL ENCOUNTER (OUTPATIENT)
Dept: RADIATION ONCOLOGY | Age: 70
Discharge: HOME | End: 2025-08-26
Payer: COMMERCIAL

## 2025-08-26 PROCEDURE — 77336 RADIATION PHYSICS CONSULT: CPT | Performed by: RADIOLOGY

## 2025-08-26 PROCEDURE — 77387 GUIDANCE FOR RADJ TX DLVR: CPT | Performed by: RADIOLOGY

## 2025-08-26 PROCEDURE — 77417 THER RADIOLOGY PORT IMAGE(S): CPT | Performed by: RADIOLOGY

## 2025-08-26 PROCEDURE — G6002 STEREOSCOPIC X-RAY GUIDANCE: HCPCS | Performed by: RADIOLOGY

## 2025-08-26 PROCEDURE — 77412 RADIATION TX DELIVERY LVL 3: CPT | Performed by: RADIOLOGY

## 2025-08-27 ENCOUNTER — HOSPITAL ENCOUNTER (OUTPATIENT)
Dept: RADIATION ONCOLOGY | Age: 70
Discharge: HOME | End: 2025-08-27
Payer: COMMERCIAL

## 2025-08-27 ENCOUNTER — TELEPHONE (OUTPATIENT)
Dept: INTERNAL MEDICINE | Age: 70
End: 2025-08-27

## 2025-08-27 VITALS
SYSTOLIC BLOOD PRESSURE: 139 MMHG | HEART RATE: 101 BPM | OXYGEN SATURATION: 98 % | DIASTOLIC BLOOD PRESSURE: 78 MMHG | BODY MASS INDEX: 34.18 KG/M2 | WEIGHT: 186.9 LBS | RESPIRATION RATE: 18 BRPM | TEMPERATURE: 98.4 F

## 2025-08-27 DIAGNOSIS — M53.3 SACRAL MASS: Primary | ICD-10-CM

## 2025-08-27 DIAGNOSIS — G89.3 CANCER RELATED PAIN: ICD-10-CM

## 2025-08-27 DIAGNOSIS — M54.16 LUMBAR RADICULITIS: ICD-10-CM

## 2025-08-27 DIAGNOSIS — M54.59 INTRACTABLE LOW BACK PAIN: ICD-10-CM

## 2025-08-27 DIAGNOSIS — M50.30 DEGENERATIVE DISC DISEASE, CERVICAL: ICD-10-CM

## 2025-08-27 DIAGNOSIS — M54.50 BACK PAIN AT L4-L5 LEVEL: ICD-10-CM

## 2025-08-27 DIAGNOSIS — C34.82 MALIGNANT NEOPLASM OF OVERLAPPING SITES OF LEFT LUNG (HCC): Primary | ICD-10-CM

## 2025-08-27 DIAGNOSIS — G89.3 PAIN OF METASTATIC MALIGNANCY: ICD-10-CM

## 2025-08-27 PROCEDURE — 77387 GUIDANCE FOR RADJ TX DLVR: CPT | Performed by: RADIOLOGY

## 2025-08-27 PROCEDURE — G6002 STEREOSCOPIC X-RAY GUIDANCE: HCPCS | Performed by: RADIOLOGY

## 2025-08-27 PROCEDURE — 77412 RADIATION TX DELIVERY LVL 3: CPT | Performed by: RADIOLOGY

## 2025-08-27 RX ORDER — ONDANSETRON 8 MG/1
8 TABLET, ORALLY DISINTEGRATING ORAL EVERY 8 HOURS PRN
Qty: 30 TABLET | Refills: 0 | Status: SHIPPED | OUTPATIENT
Start: 2025-08-27

## 2025-08-28 ENCOUNTER — HOSPITAL ENCOUNTER (OUTPATIENT)
Dept: RADIATION ONCOLOGY | Age: 70
Discharge: HOME | End: 2025-08-28
Payer: COMMERCIAL

## 2025-08-28 PROCEDURE — 77387 GUIDANCE FOR RADJ TX DLVR: CPT | Performed by: RADIOLOGY

## 2025-08-28 PROCEDURE — 77412 RADIATION TX DELIVERY LVL 3: CPT | Performed by: RADIOLOGY

## 2025-08-28 PROCEDURE — G6002 STEREOSCOPIC X-RAY GUIDANCE: HCPCS | Performed by: RADIOLOGY

## 2025-08-29 ENCOUNTER — TELEPHONE (OUTPATIENT)
Age: 70
End: 2025-08-29

## 2025-08-29 ENCOUNTER — HOSPITAL ENCOUNTER (OUTPATIENT)
Dept: RADIATION ONCOLOGY | Age: 70
Discharge: HOME | End: 2025-08-29
Payer: COMMERCIAL

## 2025-08-29 ENCOUNTER — INITIAL CONSULT (OUTPATIENT)
Age: 70
End: 2025-08-29

## 2025-08-29 VITALS
WEIGHT: 186 LBS | TEMPERATURE: 96.9 F | BODY MASS INDEX: 34.23 KG/M2 | HEART RATE: 105 BPM | OXYGEN SATURATION: 95 % | HEIGHT: 62 IN

## 2025-08-29 DIAGNOSIS — C34.00 SMALL CELL CARCINOMA OF HILUM OF LUNG, UNSPECIFIED LATERALITY (HCC): ICD-10-CM

## 2025-08-29 DIAGNOSIS — G89.4 CHRONIC PAIN SYNDROME: ICD-10-CM

## 2025-08-29 DIAGNOSIS — Z85.3 HISTORY OF LEFT BREAST CANCER: Primary | ICD-10-CM

## 2025-08-29 PROCEDURE — 77412 RADIATION TX DELIVERY LVL 3: CPT | Performed by: RADIOLOGY

## 2025-08-29 PROCEDURE — 77387 GUIDANCE FOR RADJ TX DLVR: CPT | Performed by: RADIOLOGY

## 2025-09-02 ENCOUNTER — HOSPITAL ENCOUNTER (OUTPATIENT)
Dept: RADIATION ONCOLOGY | Age: 70
Discharge: HOME | End: 2025-09-02
Payer: COMMERCIAL

## 2025-09-02 DIAGNOSIS — D49.2: ICD-10-CM

## 2025-09-02 DIAGNOSIS — G89.3 CANCER RELATED PAIN: ICD-10-CM

## 2025-09-02 DIAGNOSIS — M54.59 INTRACTABLE LOW BACK PAIN: ICD-10-CM

## 2025-09-02 PROCEDURE — 77412 RADIATION TX DELIVERY LVL 3: CPT | Performed by: RADIOLOGY

## 2025-09-02 PROCEDURE — 77387 GUIDANCE FOR RADJ TX DLVR: CPT | Performed by: RADIOLOGY

## 2025-09-02 RX ORDER — PREGABALIN 100 MG/1
100 CAPSULE ORAL 3 TIMES DAILY
Qty: 90 CAPSULE | Refills: 0 | Status: SHIPPED | OUTPATIENT
Start: 2025-09-02 | End: 2025-10-02

## 2025-09-02 RX ORDER — FENTANYL 50 UG/1
1 PATCH TRANSDERMAL
COMMUNITY
End: 2025-09-02 | Stop reason: SDUPTHER

## 2025-09-02 RX ORDER — OXYCODONE HYDROCHLORIDE 15 MG/1
15 TABLET ORAL EVERY 4 HOURS PRN
Qty: 180 TABLET | Refills: 0 | Status: SHIPPED | OUTPATIENT
Start: 2025-09-02 | End: 2025-10-02

## 2025-09-02 RX ORDER — FENTANYL 50 UG/1
1 PATCH TRANSDERMAL
Qty: 10 PATCH | Refills: 0 | Status: SHIPPED | OUTPATIENT
Start: 2025-09-02 | End: 2025-10-02

## 2025-09-03 ENCOUNTER — HOSPITAL ENCOUNTER (OUTPATIENT)
Dept: RADIATION ONCOLOGY | Age: 70
Discharge: HOME | End: 2025-09-03
Payer: COMMERCIAL

## 2025-09-03 VITALS
HEART RATE: 110 BPM | SYSTOLIC BLOOD PRESSURE: 110 MMHG | TEMPERATURE: 96.5 F | DIASTOLIC BLOOD PRESSURE: 54 MMHG | OXYGEN SATURATION: 96 % | RESPIRATION RATE: 16 BRPM

## 2025-09-03 DIAGNOSIS — K21.9 GASTROESOPHAGEAL REFLUX DISEASE, UNSPECIFIED WHETHER ESOPHAGITIS PRESENT: Primary | ICD-10-CM

## 2025-09-03 PROCEDURE — 77387 GUIDANCE FOR RADJ TX DLVR: CPT | Performed by: RADIOLOGY

## 2025-09-03 PROCEDURE — 77412 RADIATION TX DELIVERY LVL 3: CPT | Performed by: RADIOLOGY

## 2025-09-03 PROCEDURE — 77336 RADIATION PHYSICS CONSULT: CPT | Performed by: RADIOLOGY

## 2025-09-03 RX ORDER — PANTOPRAZOLE SODIUM 40 MG/1
40 TABLET, DELAYED RELEASE ORAL
Qty: 90 TABLET | Refills: 1 | Status: SHIPPED | OUTPATIENT
Start: 2025-09-03 | End: 2025-09-03

## 2025-09-03 RX ORDER — PANTOPRAZOLE SODIUM 40 MG/1
40 TABLET, DELAYED RELEASE ORAL
Qty: 90 TABLET | Refills: 1 | Status: SHIPPED | OUTPATIENT
Start: 2025-09-03

## 2025-09-04 ENCOUNTER — TELEPHONE (OUTPATIENT)
Age: 70
End: 2025-09-04

## 2025-09-06 LAB
FINAL REPORT: NORMAL
PRELIMINARY: NORMAL

## (undated) DEVICE — SHEET,DRAPE,53X77,STERILE: Brand: MEDLINE

## (undated) DEVICE — SYRINGE MED 30ML STD CLR PLAS LUERLOCK TIP N CTRL DISP

## (undated) DEVICE — STOPCOCK 3-WAY 45 PSI LOW OFF ROT COLAR

## (undated) DEVICE — GLOVE ORANGE PI 7 1/2   MSG9075

## (undated) DEVICE — SYRINGE MED 10ML SLIP TIP BLNT FILL AND LUERLOCK DISP

## (undated) DEVICE — BITE BLOCK ENDOSCP AD 60 FR W/ ADJ STRP PLAS GRN BLOX

## (undated) DEVICE — Device

## (undated) DEVICE — SYRINGE MED 30ML SLIP TIP BLNT FILL AND LUERLOCK DISP

## (undated) DEVICE — APPLICATOR MEDICATED 10.5 CC SOLUTION HI LT ORNG CHLORAPREP

## (undated) DEVICE — SYRINGE MEDICAL 3ML CLEAR PLASTIC STANDARD NON CONTROL LUERLOCK TIP DISPOSABLE

## (undated) DEVICE — VALVE SUCT DISP FOR BRONCHSCP

## (undated) DEVICE — FORCEPS BX L100CM DIA1.8MM WRK CHN 2MM PULM S STL RAD JAW 4

## (undated) DEVICE — LABEL MED MINI W/ MARKER

## (undated) DEVICE — MANIFOLD SUCT SMK EVAC SGL PRT DISP NEPTUNE 2

## (undated) DEVICE — BANDAGE ADH W0.75XL3IN UNIV WVN FAB NAT GEN USE STRP N ADH

## (undated) DEVICE — HYPODERMIC SAFETY NEEDLE: Brand: MAGELLAN

## (undated) DEVICE — NEEDLE SPINAL 22GA L3.5IN SPINOCAN

## (undated) DEVICE — NEEDLE HYPO 25GA L1.5IN BLU POLYPR HUB S STL REG BVL STR

## (undated) DEVICE — DRAPE SURG ST 3/4 SHEET W53XL77IN STD POLYPR 3 QTR DISP

## (undated) DEVICE — BRUSH ENDO CLN L90.5IN SHTH DIA1.7MM BRIST DIA5-7MM 2-6MM

## (undated) DEVICE — NEEDLE HYPO 18GA L1.5IN ULT SHRP TRIBEVELED INTUITIVE 1 HND

## (undated) DEVICE — SPONGE GZ W4XL4IN COT 12 PLY TYP VII WVN C FLD DSGN STERILE

## (undated) DEVICE — VALVE BPSY SCTN STRL ENDOSCP F

## (undated) DEVICE — ADAPTER TBNG DIA15MM SWVL FBROPT BRONCHSCP TERM 2 AXIS PEEP

## (undated) DEVICE — SYRINGE MED 10ML LUERLOCK TIP W/O SFTY DISP

## (undated) DEVICE — DRESSING GZ W1XL8IN COT XRFRM N ADH OVERWRAP CURAD

## (undated) DEVICE — CONTAINER SPEC 4OZ POLYPR GRAD SCR LID LEAK RESIST ID LBL

## (undated) DEVICE — ENDOSCOPIC TRAY TRNSPRT 20.5X16.5X4.1 IN RECYCL SUGAR PULP

## (undated) DEVICE — TUBING SUCT L12FT DIA0.28125IN UNIV W/ STR SCALLOPED FEM

## (undated) DEVICE — TOWEL SURG W17XL27IN BLU COT STD PREWASHED DELINTED 4 PER STRL PK

## (undated) DEVICE — COVER TBL W44XL90IN STD POLYPR REINF DISP CONVERTORS